# Patient Record
Sex: FEMALE | Race: WHITE | NOT HISPANIC OR LATINO | Employment: OTHER | URBAN - METROPOLITAN AREA
[De-identification: names, ages, dates, MRNs, and addresses within clinical notes are randomized per-mention and may not be internally consistent; named-entity substitution may affect disease eponyms.]

---

## 2017-01-26 ENCOUNTER — COMMUNICATION - HEALTHEAST (OUTPATIENT)
Dept: INTERNAL MEDICINE | Facility: CLINIC | Age: 67
End: 2017-01-26

## 2017-05-15 ENCOUNTER — COMMUNICATION - HEALTHEAST (OUTPATIENT)
Dept: INTERNAL MEDICINE | Facility: CLINIC | Age: 67
End: 2017-05-15

## 2017-07-16 ENCOUNTER — COMMUNICATION - HEALTHEAST (OUTPATIENT)
Dept: INTERNAL MEDICINE | Facility: CLINIC | Age: 67
End: 2017-07-16

## 2017-08-09 ENCOUNTER — COMMUNICATION - HEALTHEAST (OUTPATIENT)
Dept: INTERNAL MEDICINE | Facility: CLINIC | Age: 67
End: 2017-08-09

## 2017-10-03 ENCOUNTER — AMBULATORY - HEALTHEAST (OUTPATIENT)
Dept: NURSING | Facility: CLINIC | Age: 67
End: 2017-10-03

## 2017-11-16 ENCOUNTER — COMMUNICATION - HEALTHEAST (OUTPATIENT)
Dept: INTERNAL MEDICINE | Facility: CLINIC | Age: 67
End: 2017-11-16

## 2018-02-20 ENCOUNTER — COMMUNICATION - HEALTHEAST (OUTPATIENT)
Dept: INTERNAL MEDICINE | Facility: CLINIC | Age: 68
End: 2018-02-20

## 2018-02-20 DIAGNOSIS — I10 BENIGN ESSENTIAL HTN: ICD-10-CM

## 2018-12-17 ENCOUNTER — COMMUNICATION - HEALTHEAST (OUTPATIENT)
Dept: INTERNAL MEDICINE | Facility: CLINIC | Age: 68
End: 2018-12-17

## 2019-01-14 ENCOUNTER — OFFICE VISIT - HEALTHEAST (OUTPATIENT)
Dept: INTERNAL MEDICINE | Facility: CLINIC | Age: 69
End: 2019-01-14

## 2019-01-14 DIAGNOSIS — G56.01 CARPAL TUNNEL SYNDROME OF RIGHT WRIST: ICD-10-CM

## 2019-01-14 DIAGNOSIS — Z00.00 ROUTINE GENERAL MEDICAL EXAMINATION AT A HEALTH CARE FACILITY: ICD-10-CM

## 2019-01-14 DIAGNOSIS — Z12.31 VISIT FOR SCREENING MAMMOGRAM: ICD-10-CM

## 2019-01-14 DIAGNOSIS — I10 ESSENTIAL HYPERTENSION: ICD-10-CM

## 2019-01-14 DIAGNOSIS — Z12.11 SCREEN FOR COLON CANCER: ICD-10-CM

## 2019-01-14 LAB
ALBUMIN SERPL-MCNC: 3.9 G/DL (ref 3.5–5)
ALP SERPL-CCNC: 117 U/L (ref 45–120)
ALT SERPL W P-5'-P-CCNC: 15 U/L (ref 0–45)
ANION GAP SERPL CALCULATED.3IONS-SCNC: 11 MMOL/L (ref 5–18)
AST SERPL W P-5'-P-CCNC: 20 U/L (ref 0–40)
BILIRUB SERPL-MCNC: 1 MG/DL (ref 0–1)
BUN SERPL-MCNC: 32 MG/DL (ref 8–22)
CALCIUM SERPL-MCNC: 9.7 MG/DL (ref 8.5–10.5)
CHLORIDE BLD-SCNC: 104 MMOL/L (ref 98–107)
CHOLEST SERPL-MCNC: 187 MG/DL
CO2 SERPL-SCNC: 25 MMOL/L (ref 22–31)
CREAT SERPL-MCNC: 1.28 MG/DL (ref 0.6–1.1)
ERYTHROCYTE [DISTWIDTH] IN BLOOD BY AUTOMATED COUNT: 11.7 % (ref 11–14.5)
FASTING STATUS PATIENT QL REPORTED: NORMAL
GFR SERPL CREATININE-BSD FRML MDRD: 41 ML/MIN/1.73M2
GLUCOSE BLD-MCNC: 93 MG/DL (ref 70–125)
HCT VFR BLD AUTO: 41.3 % (ref 35–47)
HDLC SERPL-MCNC: 62 MG/DL
HGB BLD-MCNC: 14 G/DL (ref 12–16)
LDLC SERPL CALC-MCNC: 98 MG/DL
MCH RBC QN AUTO: 29.7 PG (ref 27–34)
MCHC RBC AUTO-ENTMCNC: 33.8 G/DL (ref 32–36)
MCV RBC AUTO: 88 FL (ref 80–100)
PLATELET # BLD AUTO: 232 THOU/UL (ref 140–440)
PMV BLD AUTO: 7.9 FL (ref 7–10)
POTASSIUM BLD-SCNC: 4 MMOL/L (ref 3.5–5)
PROT SERPL-MCNC: 7.7 G/DL (ref 6–8)
RBC # BLD AUTO: 4.71 MILL/UL (ref 3.8–5.4)
SODIUM SERPL-SCNC: 140 MMOL/L (ref 136–145)
TRIGL SERPL-MCNC: 137 MG/DL
WBC: 8.7 THOU/UL (ref 4–11)

## 2019-01-14 ASSESSMENT — MIFFLIN-ST. JEOR: SCORE: 1166.79

## 2019-01-15 ENCOUNTER — COMMUNICATION - HEALTHEAST (OUTPATIENT)
Dept: INTERNAL MEDICINE | Facility: CLINIC | Age: 69
End: 2019-01-15

## 2019-01-18 ENCOUNTER — RECORDS - HEALTHEAST (OUTPATIENT)
Dept: ADMINISTRATIVE | Facility: OTHER | Age: 69
End: 2019-01-18

## 2019-02-14 ENCOUNTER — RECORDS - HEALTHEAST (OUTPATIENT)
Dept: ADMINISTRATIVE | Facility: OTHER | Age: 69
End: 2019-02-14

## 2019-02-18 ENCOUNTER — OFFICE VISIT - HEALTHEAST (OUTPATIENT)
Dept: INTERNAL MEDICINE | Facility: CLINIC | Age: 69
End: 2019-02-18

## 2019-02-18 DIAGNOSIS — Z01.818 PREOP GENERAL PHYSICAL EXAM: ICD-10-CM

## 2019-02-18 DIAGNOSIS — G56.01 CARPAL TUNNEL SYNDROME OF RIGHT WRIST: ICD-10-CM

## 2019-02-18 DIAGNOSIS — I10 ESSENTIAL HYPERTENSION: ICD-10-CM

## 2019-02-18 LAB
ANION GAP SERPL CALCULATED.3IONS-SCNC: 11 MMOL/L (ref 5–18)
BUN SERPL-MCNC: 30 MG/DL (ref 8–22)
CALCIUM SERPL-MCNC: 9.9 MG/DL (ref 8.5–10.5)
CHLORIDE BLD-SCNC: 104 MMOL/L (ref 98–107)
CO2 SERPL-SCNC: 25 MMOL/L (ref 22–31)
CREAT SERPL-MCNC: 1.08 MG/DL (ref 0.6–1.1)
ERYTHROCYTE [DISTWIDTH] IN BLOOD BY AUTOMATED COUNT: 12.6 % (ref 11–14.5)
GFR SERPL CREATININE-BSD FRML MDRD: 50 ML/MIN/1.73M2
GLUCOSE BLD-MCNC: 83 MG/DL (ref 70–125)
HCT VFR BLD AUTO: 42.1 % (ref 35–47)
HGB BLD-MCNC: 14.5 G/DL (ref 12–16)
MCH RBC QN AUTO: 29.5 PG (ref 27–34)
MCHC RBC AUTO-ENTMCNC: 34.4 G/DL (ref 32–36)
MCV RBC AUTO: 86 FL (ref 80–100)
PLATELET # BLD AUTO: 230 THOU/UL (ref 140–440)
PMV BLD AUTO: 8.5 FL (ref 7–10)
POTASSIUM BLD-SCNC: 4.1 MMOL/L (ref 3.5–5)
RBC # BLD AUTO: 4.9 MILL/UL (ref 3.8–5.4)
SODIUM SERPL-SCNC: 140 MMOL/L (ref 136–145)
WBC: 10.7 THOU/UL (ref 4–11)

## 2019-02-18 ASSESSMENT — MIFFLIN-ST. JEOR: SCORE: 1166.79

## 2019-02-19 LAB
ATRIAL RATE - MUSE: 55 BPM
DIASTOLIC BLOOD PRESSURE - MUSE: NORMAL MMHG
INTERPRETATION ECG - MUSE: NORMAL
P AXIS - MUSE: 36 DEGREES
PR INTERVAL - MUSE: 144 MS
QRS DURATION - MUSE: 70 MS
QT - MUSE: 426 MS
QTC - MUSE: 407 MS
R AXIS - MUSE: -3 DEGREES
SYSTOLIC BLOOD PRESSURE - MUSE: NORMAL MMHG
T AXIS - MUSE: 29 DEGREES
VENTRICULAR RATE- MUSE: 55 BPM

## 2019-02-21 ENCOUNTER — RECORDS - HEALTHEAST (OUTPATIENT)
Dept: ADMINISTRATIVE | Facility: OTHER | Age: 69
End: 2019-02-21

## 2019-03-05 ENCOUNTER — COMMUNICATION - HEALTHEAST (OUTPATIENT)
Dept: LAB | Facility: CLINIC | Age: 69
End: 2019-03-05

## 2019-03-05 DIAGNOSIS — I10 ESSENTIAL HYPERTENSION: ICD-10-CM

## 2019-03-06 ENCOUNTER — RECORDS - HEALTHEAST (OUTPATIENT)
Dept: ADMINISTRATIVE | Facility: OTHER | Age: 69
End: 2019-03-06

## 2019-03-18 ENCOUNTER — AMBULATORY - HEALTHEAST (OUTPATIENT)
Dept: LAB | Facility: CLINIC | Age: 69
End: 2019-03-18

## 2019-03-18 DIAGNOSIS — I10 ESSENTIAL HYPERTENSION: ICD-10-CM

## 2019-03-18 LAB
CREAT SERPL-MCNC: 1.24 MG/DL (ref 0.6–1.1)
GFR SERPL CREATININE-BSD FRML MDRD: 43 ML/MIN/1.73M2

## 2019-03-21 ENCOUNTER — AMBULATORY - HEALTHEAST (OUTPATIENT)
Dept: INTERNAL MEDICINE | Facility: CLINIC | Age: 69
End: 2019-03-21

## 2019-03-22 ENCOUNTER — HOSPITAL ENCOUNTER (OUTPATIENT)
Dept: MAMMOGRAPHY | Facility: CLINIC | Age: 69
Discharge: HOME | End: 2019-03-22
Attending: INTERNAL MEDICINE

## 2019-03-22 DIAGNOSIS — Z12.31 VISIT FOR SCREENING MAMMOGRAM: ICD-10-CM

## 2019-04-04 ENCOUNTER — RECORDS - HEALTHEAST (OUTPATIENT)
Dept: ADMINISTRATIVE | Facility: OTHER | Age: 69
End: 2019-04-04

## 2019-04-24 ENCOUNTER — RECORDS - HEALTHEAST (OUTPATIENT)
Dept: ADMINISTRATIVE | Facility: OTHER | Age: 69
End: 2019-04-24

## 2019-05-03 ENCOUNTER — COMMUNICATION - HEALTHEAST (OUTPATIENT)
Dept: INTERNAL MEDICINE | Facility: CLINIC | Age: 69
End: 2019-05-03

## 2019-05-09 ENCOUNTER — OFFICE VISIT - HEALTHEAST (OUTPATIENT)
Dept: INTERNAL MEDICINE | Facility: CLINIC | Age: 69
End: 2019-05-09

## 2019-05-09 ENCOUNTER — COMMUNICATION - HEALTHEAST (OUTPATIENT)
Dept: TELEHEALTH | Facility: CLINIC | Age: 69
End: 2019-05-09

## 2019-05-09 ENCOUNTER — COMMUNICATION - HEALTHEAST (OUTPATIENT)
Dept: INTERNAL MEDICINE | Facility: CLINIC | Age: 69
End: 2019-05-09

## 2019-05-09 DIAGNOSIS — R79.89 ELEVATED SERUM CREATININE: ICD-10-CM

## 2019-05-09 DIAGNOSIS — I10 ESSENTIAL HYPERTENSION: ICD-10-CM

## 2019-05-09 DIAGNOSIS — G56.01 CARPAL TUNNEL SYNDROME OF RIGHT WRIST: ICD-10-CM

## 2019-05-09 LAB
CREAT SERPL-MCNC: 1.06 MG/DL (ref 0.6–1.1)
GFR SERPL CREATININE-BSD FRML MDRD: 51 ML/MIN/1.73M2

## 2019-05-09 ASSESSMENT — MIFFLIN-ST. JEOR: SCORE: 1158.26

## 2021-03-04 ENCOUNTER — AMBULATORY - HEALTHEAST (OUTPATIENT)
Dept: NURSING | Facility: CLINIC | Age: 71
End: 2021-03-04

## 2021-03-25 ENCOUNTER — AMBULATORY - HEALTHEAST (OUTPATIENT)
Dept: NURSING | Facility: CLINIC | Age: 71
End: 2021-03-25

## 2021-05-07 ENCOUNTER — OFFICE VISIT - HEALTHEAST (OUTPATIENT)
Dept: INTERNAL MEDICINE | Facility: CLINIC | Age: 71
End: 2021-05-07

## 2021-05-07 DIAGNOSIS — E55.9 VITAMIN D INSUFFICIENCY: ICD-10-CM

## 2021-05-07 DIAGNOSIS — M81.0 AGE-RELATED OSTEOPOROSIS WITHOUT CURRENT PATHOLOGICAL FRACTURE: ICD-10-CM

## 2021-05-07 DIAGNOSIS — E78.2 MIXED HYPERLIPIDEMIA: ICD-10-CM

## 2021-05-07 DIAGNOSIS — N18.2 CHRONIC KIDNEY DISEASE, STAGE II (MILD): ICD-10-CM

## 2021-05-07 DIAGNOSIS — I78.1 NON-NEOPLASTIC NEVUS: ICD-10-CM

## 2021-05-07 DIAGNOSIS — Z12.31 VISIT FOR SCREENING MAMMOGRAM: ICD-10-CM

## 2021-05-07 LAB
ALBUMIN SERPL-MCNC: 4.1 G/DL (ref 3.5–5)
ALP SERPL-CCNC: 122 U/L (ref 45–120)
ALT SERPL W P-5'-P-CCNC: 18 U/L (ref 0–45)
ANION GAP SERPL CALCULATED.3IONS-SCNC: 11 MMOL/L (ref 5–18)
AST SERPL W P-5'-P-CCNC: 23 U/L (ref 0–40)
BILIRUB SERPL-MCNC: 0.8 MG/DL (ref 0–1)
BUN SERPL-MCNC: 19 MG/DL (ref 8–28)
CALCIUM SERPL-MCNC: 9.3 MG/DL (ref 8.5–10.5)
CHLORIDE BLD-SCNC: 106 MMOL/L (ref 98–107)
CHOLEST SERPL-MCNC: 166 MG/DL
CO2 SERPL-SCNC: 26 MMOL/L (ref 22–31)
CREAT SERPL-MCNC: 1.02 MG/DL (ref 0.6–1.1)
ERYTHROCYTE [DISTWIDTH] IN BLOOD BY AUTOMATED COUNT: 13.4 % (ref 11–14.5)
FASTING STATUS PATIENT QL REPORTED: YES
GFR SERPL CREATININE-BSD FRML MDRD: 53 ML/MIN/1.73M2
GLUCOSE BLD-MCNC: 88 MG/DL (ref 70–125)
HCT VFR BLD AUTO: 43.9 % (ref 35–47)
HDLC SERPL-MCNC: 59 MG/DL
HGB BLD-MCNC: 14.2 G/DL (ref 12–16)
LDLC SERPL CALC-MCNC: 94 MG/DL
MCH RBC QN AUTO: 28.9 PG (ref 27–34)
MCHC RBC AUTO-ENTMCNC: 32.3 G/DL (ref 32–36)
MCV RBC AUTO: 89 FL (ref 80–100)
PLATELET # BLD AUTO: 260 THOU/UL (ref 140–440)
PMV BLD AUTO: 10.4 FL (ref 7–10)
POTASSIUM BLD-SCNC: 4.5 MMOL/L (ref 3.5–5)
PROT SERPL-MCNC: 7.6 G/DL (ref 6–8)
RBC # BLD AUTO: 4.92 MILL/UL (ref 3.8–5.4)
SODIUM SERPL-SCNC: 143 MMOL/L (ref 136–145)
TRIGL SERPL-MCNC: 66 MG/DL
TSH SERPL DL<=0.005 MIU/L-ACNC: 1.56 UIU/ML (ref 0.3–5)
WBC: 8.1 THOU/UL (ref 4–11)

## 2021-05-07 ASSESSMENT — MIFFLIN-ST. JEOR: SCORE: 1116.89

## 2021-05-10 LAB — 25(OH)D3 SERPL-MCNC: 36.7 NG/ML (ref 30–80)

## 2021-05-11 ENCOUNTER — RECORDS - HEALTHEAST (OUTPATIENT)
Dept: ADMINISTRATIVE | Facility: OTHER | Age: 71
End: 2021-05-11

## 2021-05-11 ENCOUNTER — RECORDS - HEALTHEAST (OUTPATIENT)
Dept: BONE DENSITY | Facility: CLINIC | Age: 71
End: 2021-05-11

## 2021-05-11 ENCOUNTER — RECORDS - HEALTHEAST (OUTPATIENT)
Dept: MAMMOGRAPHY | Facility: CLINIC | Age: 71
End: 2021-05-11

## 2021-05-11 DIAGNOSIS — M81.0 AGE-RELATED OSTEOPOROSIS WITHOUT CURRENT PATHOLOGICAL FRACTURE: ICD-10-CM

## 2021-05-11 DIAGNOSIS — Z12.31 ENCOUNTER FOR SCREENING MAMMOGRAM FOR MALIGNANT NEOPLASM OF BREAST: ICD-10-CM

## 2021-05-25 ENCOUNTER — RECORDS - HEALTHEAST (OUTPATIENT)
Dept: ADMINISTRATIVE | Facility: CLINIC | Age: 71
End: 2021-05-25

## 2021-05-26 ENCOUNTER — RECORDS - HEALTHEAST (OUTPATIENT)
Dept: ADMINISTRATIVE | Facility: CLINIC | Age: 71
End: 2021-05-26

## 2021-05-27 ENCOUNTER — RECORDS - HEALTHEAST (OUTPATIENT)
Dept: ADMINISTRATIVE | Facility: CLINIC | Age: 71
End: 2021-05-27

## 2021-05-27 VITALS
DIASTOLIC BLOOD PRESSURE: 82 MMHG | SYSTOLIC BLOOD PRESSURE: 124 MMHG | WEIGHT: 143 LBS | HEIGHT: 62 IN | HEART RATE: 76 BPM | BODY MASS INDEX: 26.31 KG/M2 | TEMPERATURE: 98.7 F | OXYGEN SATURATION: 96 %

## 2021-05-28 ENCOUNTER — RECORDS - HEALTHEAST (OUTPATIENT)
Dept: ADMINISTRATIVE | Facility: CLINIC | Age: 71
End: 2021-05-28

## 2021-05-28 NOTE — TELEPHONE ENCOUNTER
"Upcoming Appointment Question  When is the appointment: Thurs 5/09/2019  What is your appointment for?: 6 week follow up \"repeat creatinine level and repeat blood pressure reading\"  Who is your appointment scheduled with?: PCP only  What is your question/concern?: Patient wonders if Dr Howe would like her to have creatinine lab drawn prior to this appointment.  Okay to leave a detailed message?: Yes    "

## 2021-05-28 NOTE — PROGRESS NOTES
"Office Visit - Follow up    Marifer DE LA ROSA Marion   69 y.o. female    Date of Visit: 5/9/2019    Chief Complaint   Patient presents with     Follow-up     labs recheck     Hypertension     Off Lisinopril-HCTZ X6 weeks - BP check       Subjective: Marifer is seen after recent successful carpal tunnel surgery    Generally has done well since that time and has had no significant symptoms with healing    Did have modest elevation of serum creatinine and we elected to stop her ACE inhibitor.  She is feeling well and due for follow-up creatinine        ROS: A comprehensive review of systems was performed and was otherwise negative except as mentioned above.     Exam  Right wrist looks excellent is healing well no loss of function hand grasp is normal remainder of brief exam negative   /80 (Patient Site: Right Arm, Patient Position: Sitting, Cuff Size: Adult Regular)   Pulse 63   Ht 5' 2\" (1.575 m)   Wt 152 lb 1.9 oz (69 kg)   SpO2 99%   Breastfeeding? No   BMI 27.82 kg/m      Assessment and Plan  Mild increase in creatinine follow-up plan off of ACE inhibitor    Successful carpal tunnel surgery stable    Hypertension stable off of therapy    Marifer was seen today for follow-up and hypertension.    Diagnoses and all orders for this visit:    Carpal tunnel syndrome of right wrist    Essential hypertension  -     Discontinue: cloNIDine HCl (CATAPRES) 0.1 MG tablet; Take 0.5 tablets (0.05 mg total) by mouth 2 (two) times a day.    Elevated serum creatinine  -     Discontinue: cloNIDine HCl (CATAPRES) 0.1 MG tablet; Take 0.5 tablets (0.05 mg total) by mouth 2 (two) times a day.  -     Creatinine          Time: total time spent with the patient was 20 minutes of which >50% was spent in counseling and coordination of care        No Known Allergies    Medications :  Prior to Admission medications    Medication Sig Start Date End Date Taking? Authorizing Provider   cholecalciferol, vitamin D3, (VITAMIN D3) 2,000 unit capsule " Take 1,000 Units by mouth daily.   Yes PROVIDER, HISTORICAL   cloNIDine HCl (CATAPRES) 0.1 MG tablet Take 1 tablet (0.1 mg total) by mouth 2 (two) times a day.  Patient taking differently: Take 0.1 mg by mouth 2 (two) times a day. Takes 1/2 tablet daily       2/21/18 5/9/19 Yes Aaron Howe MD   cloNIDine HCl (CATAPRES) 0.1 MG tablet Take 0.5 tablets (0.05 mg total) by mouth 2 (two) times a day. 5/9/19 5/9/19 Yes Aaron Howe MD   cloNIDine HCl (CATAPRES) 0.1 MG tablet Take 0.5 tablets (0.05 mg total) by mouth 2 (two) times a day. 5/9/19   Aaron Howe MD   lisinopril-hydrochlorothiazide (PRINZIDE,ZESTORETIC) 10-12.5 mg per tablet TAKE 1 TABLET BY MOUTH DAILY 11/28/15 5/9/19  Aaron Howe MD        Past Medical History: No past medical history on file.    Past Surgical History: No past surgical history on file.    Social History:   Social History     Socioeconomic History     Marital status:      Spouse name: Not on file     Number of children: Not on file     Years of education: Not on file     Highest education level: Not on file   Occupational History     Not on file   Social Needs     Financial resource strain: Not on file     Food insecurity:     Worry: Not on file     Inability: Not on file     Transportation needs:     Medical: Not on file     Non-medical: Not on file   Tobacco Use     Smoking status: Never Smoker     Smokeless tobacco: Never Used   Substance and Sexual Activity     Alcohol use: Not on file     Drug use: Not on file     Sexual activity: Not on file   Lifestyle     Physical activity:     Days per week: Not on file     Minutes per session: Not on file     Stress: Not on file   Relationships     Social connections:     Talks on phone: Not on file     Gets together: Not on file     Attends Protestant service: Not on file     Active member of club or organization: Not on file     Attends meetings of clubs or organizations: Not on file     Relationship status: Not on file      Intimate partner violence:     Fear of current or ex partner: Not on file     Emotionally abused: Not on file     Physically abused: Not on file     Forced sexual activity: Not on file   Other Topics Concern     Not on file   Social History Narrative     Not on file       Family History: No family history on file.      Aaron Howe MD

## 2021-05-28 NOTE — TELEPHONE ENCOUNTER
Dr. Howe,    Do you want patient to have labs drawn before appt on 5/9/2019 or at time of appt?    Rama PRITCHETT LPN .......... 11:48 AM  05/03/19

## 2021-05-29 ENCOUNTER — RECORDS - HEALTHEAST (OUTPATIENT)
Dept: ADMINISTRATIVE | Facility: CLINIC | Age: 71
End: 2021-05-29

## 2021-05-31 ENCOUNTER — RECORDS - HEALTHEAST (OUTPATIENT)
Dept: ADMINISTRATIVE | Facility: CLINIC | Age: 71
End: 2021-05-31

## 2021-06-02 VITALS — BODY MASS INDEX: 28.34 KG/M2 | WEIGHT: 154 LBS | HEIGHT: 62 IN

## 2021-06-02 VITALS — WEIGHT: 154 LBS | HEIGHT: 62 IN | BODY MASS INDEX: 28.34 KG/M2

## 2021-06-03 VITALS — WEIGHT: 152.12 LBS | BODY MASS INDEX: 27.99 KG/M2 | HEIGHT: 62 IN

## 2021-06-15 ENCOUNTER — RECORDS - HEALTHEAST (OUTPATIENT)
Dept: ADMINISTRATIVE | Facility: OTHER | Age: 71
End: 2021-06-15

## 2021-06-16 PROBLEM — G56.01 CARPAL TUNNEL SYNDROME OF RIGHT WRIST: Status: ACTIVE | Noted: 2019-01-14

## 2021-06-16 NOTE — TELEPHONE ENCOUNTER
Telephone Encounter by Mary Wolf CMA at 5/6/2019  4:00 PM     Author: Mary Wolf CMA Service: -- Author Type: Medical Assistant    Filed: 5/6/2019  4:01 PM Encounter Date: 5/3/2019 Status: Signed    : Mary Wolf CMA (Medical Assistant)       Left the following message for the patient from Dr. Howe:  Aaron Howe MD Rezac, Kellie R, MARKUS; Aaron Howe Care Team Pool 1 hour ago (2:23 PM)      I really do not think she needs to come in for another visit I would be happy to recheck her blood pressure and we will check creatinine at that time I can follow-up with her regarding the results later in the day this will save her a trip downtown      Asked the patient to call back if she has any further questions.  Mary RAHMAN CMA/CASE....................4:01 PM

## 2021-06-17 NOTE — PROGRESS NOTES
Office Visit -[facility] care   Marifer Temple   71 y.o.  female    Date of visit: 5/7/2021  Physician: Siomara Ott MD     Assessment and Plan   1. Visit for screening mammogram  Has been not really compliant with mammograms because she always used to get callbacks.  Will make her do with 3D and she can stay at the [facility] for this.  She does have family history of 2 maternal aunts with breast cancer.  - Mammo Screening Bilateral; Future    2. Age-related osteoporosis without current pathological fracture  She has never had a bone density scan explained what its purpose was and should get a baseline this year.  - DXA Bone Density Scan; Future    3. Mixed hyperlipidemia  Her lipid levels are normal and the HDL LDL ratio is normal however she has a high ASCVD score.  This was explained to her and the preventative dosing of statin was explained to her we will check her lipids today and and she is agreeable to taking a statin if necessary.    - Lipid Cascade FASTING  - HM2(CBC w/o Differential)  - Thyroid Stimulating Hormone (TSH)    4. Chronic kidney disease, stage II (mild)  She has had fluctuating GFR's and mild elevations of creatinine.  I did explain that we will label this as mild chronic kidney disease but really does not have any significant impact on her health.  - Comprehensive Metabolic Panel    5. Vitamin D insufficiency    - Vitamin D, Total (25-Hydroxy)    6. Non-neoplastic nevus  She has she has multiple seborrheic keratosis lesions and solar lentigines and some benign- Ambulatory referral to Dermatology - [facility] [place]  For large nevi over 5 mm.  I do think she should get annual skin exam by dermatologist    She is up-to-date in her colonoscopy.  Needs one every 5 years due to polyps in history colon cancer  Her last Pap smear was at age 50.  Technically that is before she was supposed to stop screening.  However she is very low risk and will defer further Pap smears    Time:  Immunizations reviewed and updated she is due for Pneumo 23 which we will give today.  Return in about 1 year (around 2022) for Annual weelness .       Patient Profile   Social History     Social History Narrative    Lives with  .     Likes to travel ,     Worked in  bank         Past Medical History   No past medical history on file.    Patient Active Problem List    Diagnosis Date Noted     Carpal tunnel syndrome of right wrist 2019     Essential Hypertension      Overview Note:     Created by Conversion    Replacement Utility updated for latest IMO load             Past Surgical History  She has a past surgical history that includes bowel obstruction ; s/p partial colectomy ; and right carpal  tunnel release.     History of Present Illness   This 71 y.o. old very pleasant patient here to establish care.  She is in her normal routine health.  She has no new concerns.  She has no chronic disease such as diabetes, hypertension ischemic heart disease and chronic lung disease.  She only takes a vitamin D supplement she is relatively active and likes to garden.  She lives with her  is fully independent in her ADLs and IADLs she did have a child who  at the age of 7.  Review of Systems: A comprehensive review of systems was negative except as noted.     Medications and Allergies   Current Outpatient Medications   Medication Sig Dispense Refill     cholecalciferol, vitamin D3, (VITAMIN D3) 2,000 unit capsule Take 1,000 Units by mouth daily.       No current facility-administered medications for this visit.      No Known Allergies     Family and Social History   Family History   Problem Relation Age of Onset     Leukemia Mother      Hypertension Mother      Colon cancer Father      Atrial fibrillation Sister      Rheum arthritis Sister      Breast cancer Maternal Aunt      Breast cancer Maternal Aunt         Social History     Tobacco Use     Smoking status: Never Smoker     Smokeless tobacco:  "Never Used   Substance Use Topics     Alcohol use: Not on file     Drug use: Not on file          Physical Exam   General Appearance:       /82 (Patient Site: Left Arm, Patient Position: Sitting, Cuff Size: Adult Regular)   Pulse 76   Temp 98.7  F (37.1  C)   Ht 5' 2\" (1.575 m)   Wt 143 lb (64.9 kg)   SpO2 96%   BMI 26.16 kg/m         EARS,  Hearing was normal to voice and the ears were normal to external exam. Nose appearance was normal and there was no discharge. Oropharynx was normal.  NECK: Neck appearance was normal. There were no neck masses and the thyroid was not enlarged.  RESPIRATORY: Breathing pattern was normal and the chest moved symmetrically.  Percussion/auscultatory percussion was normal.  Lung sounds were normal and there were no abnormal sounds.  CARDIOVASCULAR: Heart rate and rhythm were normal.  S1 and S2 were normal and there were no extra sounds or murmurs. Peripheral pulses in arms and legs were normal.  Jugular venous pressure was normal.  There was no peripheral edema.  GASTROINTESTINAL: The abdomen was normal in contour.  Bowel sounds were present.  Percussion detected no organ enlargement or tenderness.  Palpation detected no tenderness, mass, or enlarged organs.   MUSCULOSKELETAL: Skeletal configuration was normal and muscle mass was normal for age. Joint appearance was overall normal.  She she has mild kyphosis  LYMPHATIC: There were no enlarged nodes.  SKIN/HAIR/NAILS: Skin color was normal.  There were some benign seborrheic keratosis with multiple solar lentigines nevi.  Hair and nails were normal.  PSYCHIATRIC:  Mood and affect were normal and the patient had normal recent and remote memory. The patient's judgment and insight were normal.    BREASTS: No abnormality detected       Additional Information        Siomara Ott MD  Internal Medicine  Contact me at [phone]    "

## 2021-06-17 NOTE — PATIENT INSTRUCTIONS - HE
Patient Instructions by Aaron Howe MD at 1/14/2019 10:00 AM     Author: Aaron Howe MD Service: -- Author Type: Physician    Filed: 1/14/2019  4:52 PM Encounter Date: 1/14/2019 Status: Signed    : Aaron Howe MD (Physician)         Patient Education   Understanding USDA MyPlate  The USDA (US Department of Agriculture) has guidelines to help you make healthy food choices. These are called MyPlate. MyPlate shows the food groups that make up healthy meals using the image of a place setting. Before you eat, think about the healthiest choices for what to put onto your plate or into your cup or bowl. To learn more about building a healthy plate, visit www.choosemyplate.gov.       The Food Groups    Fruits: Any fruit or 100% fruit juice counts as part of the Fruit Group. Fruits may be fresh, canned, frozen, or dried, and may be whole, cut-up, or pureed. Make half your plate fruits and vegetables.    Vegetables: Any vegetable or 100% vegetable juice counts as a member of the Vegetable Group. Vegetables may be fresh, frozen, canned, or dried. They can be served raw or cooked and may be whole, cut-up, or mashed. Make half your plate fruits and vegetables.     Grains: All foods made from grains are part of the Grains Group. These include wheat, rice, oats, cornmeal, and barley such as bread, pasta, oatmeal, cereal, tortillas, and grits. Grains should be no more than a quarter of your plate. At least half of your grains should be whole grains.    Protein: This group includes meat, poultry, seafood, beans and peas, eggs, processed soy products (like tofu), nuts (including nut butters), and seeds. Make protein choices no more than a quarter of your plate. Meat and poultry choices should be lean or low fat.    Dairy: All fluid milk products and foods made from milk that contain calcium, like yogurt and cheese are part of the Dairy Group. (Foods that have little calcium, such as cream, butter, and cream  cheese, are not part of the group.) Most dairy choices should be low-fat or fat-free.    Oils: These are fats that are liquid at room temperature. They include canola, corn, olive, soybean, and sunflower oil. Foods that are mainly oil include mayonnaise, certain salad dressings, and soft margarines. You should have only 5 to 7 teaspoons of oils a day. You probably already get this much from the food you eat.  Use [employer] to Help Build Your Meals  The [employer] can help you plan and track your meals and activity. You can look up individual foods to see or compare their nutritional value. You can get guidelines for what and how much you should eat. You can compare your food choices. And you can assess personal physical activities and see ways you can improve. Go to www.[employer].gov/Register My InfoÂ®cker/.    8944-7638 [employer]. [address] [place] [address]. All rights reserved. This information is not intended as a substitute for professional medical care. Always follow your healthcare professional's instructions.           Patient Education   Understanding Advance Care Planning  Advance care planning is the process of deciding ones own future medical care. It helps ensure that if you cant speak for yourself, your wishes can still be carried out. The plan is a series of legal documents that note a persons wishes. The documents vary by state. Advance care planning may be done when a person has a serious illness that is expected to get worse. It may be done before major surgery. And it can help you and your family be prepared in case of a major illness or injury. Advance care planning helps with making decisions at these times.       A health care proxy is a person who acts as the voice of a patient when the patient cant speak for himself or herself. The name of this role varies by state. It may be called a Durable Medical Power of  or Durable Power of  for Healthcare.  It may be called an agent, surrogate, or advocate. Or it may be called a representative or decision maker. It is an official duty that is identified by a legal document. The document also varies by state.    Why Is Advance Care Planning Important?  If a person communicates their healthcare wishes:    They will be given medical care that matches their values and goals.    Their family members will not be forced to make decisions in a crisis with no guidance.  Creating a Plan  Making an advance care plan is often done in 3 steps:    Thinking about ones wishes. To create an advance care plan, you should think about what kind of medical treatment you would want if you lose the ability to communicate. Are there any situations in which you would refuse or stop treatment? Are there therapies you would want or not want? And whom do you want to make decisions for you? There are many places to learn more about how to plan for your care. Ask your doctor or  for resources.    Picking a health care proxy. This means choosing a trusted person to speak for you only when you cant speak for yourself. When you cannot make medical decisions, your proxy makes sure the instructions in your advance care plan are followed. A proxy does not make decisions based on his or her own opinions. They must put aside those opinions and values if needed, and carry out your wishes.    Filling out the legal documents. There are several kinds of legal documents for advance care planning. Each one tells health care providers your wishes. The documents may vary by state. They must be signed and may need to be witnessed or notarized. You can cancel or change them whenever you wish. Depending on your state, the documents may include a Healthcare Proxy form, Living Will, Durable Medical Power of , Advance Directive, or others.  The Familys Role  The best help a family can give is to support their loved ones wishes. Open and honest  communication is vital. Family should express any concerns they have about the patients choices while the patient can still make decisions.    5028-3789 [employer]. [address] [place] [address], [place] [address]. All rights reserved. This information is not intended as a substitute for professional medical care. Always follow your healthcare professional's instructions.         Also, [facility] offers a free, downloadable health care directive that allows you to share your treatment choices and personal preferences if you cannot communicate your wishes. It also allows you to appoint another person (called a health care agent) to make health care decisions if you are unable to do so. You can download an advance directive by going here: [email or web address]     Patient Education   Personalized Prevention Plan  You are due for the preventive services outlined below.  Your care team is available to assist you in scheduling these services.  If you have already completed any of these items, please share that information with your care team to update in your medical record.  Health Maintenance   Topic Date Due   ? ZOSTER VACCINES (1 of 2) 04/21/2000   ? MAMMOGRAM  12/27/2012   ? DXA SCAN  04/21/2015   ? PNEUMOCOCCAL POLYSACCHARIDE VACCINE AGE 65 AND OVER  04/21/2015   ? PNEUMOCOCCAL CONJUGATE VACCINE FOR ADULTS (PCV13 OR PREVNAR)  04/21/2015   ? FALL RISK ASSESSMENT  04/21/2015   ? INFLUENZA VACCINE RULE BASED (1) 08/01/2018   ? TD 18+ HE  12/23/2018   ? COLONOSCOPY  04/06/2019   ? ADVANCE DIRECTIVES DISCUSSED WITH PATIENT  10/24/2021

## 2021-06-18 NOTE — LETTER
Letter by Aaron Howe MD at      Author: Aaron Howe MD Service: -- Author Type: --    Filed:  Encounter Date: 1/15/2019 Status: (Other)       Marifer Temple  [address] [facility] [address]             January 15, 2019         Dear Ms. Temple,    Below are the results from your recent visit:    Resulted Orders   HM2(CBC w/o Differential)   Result Value Ref Range    WBC 8.7 4.0 - 11.0 thou/uL    RBC 4.71 3.80 - 5.40 mill/uL    Hemoglobin 14.0 12.0 - 16.0 g/dL    Hematocrit 41.3 35.0 - 47.0 %    MCV 88 80 - 100 fL    MCH 29.7 27.0 - 34.0 pg    MCHC 33.8 32.0 - 36.0 g/dL    RDW 11.7 11.0 - 14.5 %    Platelets 232 140 - 440 thou/uL    MPV 7.9 7.0 - 10.0 fL   Comprehensive Metabolic Panel   Result Value Ref Range    Sodium 140 136 - 145 mmol/L    Potassium 4.0 3.5 - 5.0 mmol/L    Chloride 104 98 - 107 mmol/L    CO2 25 22 - 31 mmol/L    Anion Gap, Calculation 11 5 - 18 mmol/L    Glucose 93 70 - 125 mg/dL    BUN 32 (H) 8 - 22 mg/dL    Creatinine 1.28 (H) 0.60 - 1.10 mg/dL    GFR MDRD Af Amer 50 (L) >60 mL/min/1.73m2    GFR MDRD Non Af Amer 41 (L) >60 mL/min/1.73m2    Bilirubin, Total 1.0 0.0 - 1.0 mg/dL    Calcium 9.7 8.5 - 10.5 mg/dL    Protein, Total 7.7 6.0 - 8.0 g/dL    Albumin 3.9 3.5 - 5.0 g/dL    Alkaline Phosphatase 117 45 - 120 U/L    AST 20 0 - 40 U/L    ALT 15 0 - 45 U/L    Narrative    Fasting Glucose reference range is 70-99 mg/dL per  American Diabetes Association (ADA) guidelines.   Lipid Cascade   Result Value Ref Range    Cholesterol 187 <=199 mg/dL    Triglycerides 137 <=149 mg/dL    HDL Cholesterol 62 >=50 mg/dL    LDL Calculated 98 <=129 mg/dL    Patient Fasting > 8hrs? Unknown        All labs look good.  Your creatinine is up a little bit.  This is a measure of kidney function and may be related to mild dehydration.  I do not think there is anything to be concerned about however I would make sure you drink plenty of liquids and we should repeat your creatinine in 2 months    Please  call with questions or contact us using [employer].    Sincerely,        Electronically signed by Aaron Howe MD

## 2021-06-19 NOTE — LETTER
Letter by Aaron Howe MD at      Author: Aaron Howe MD Service: -- Author Type: --    Filed:  Encounter Date: 5/9/2019 Status: (Other)         Marifer Temple  [facility] [place] [address]             May 9, 2019         Dear Ms. Temple,    Below are the results from your recent visit:    Resulted Orders   Creatinine   Result Value Ref Range    Creatinine 1.06 0.60 - 1.10 mg/dL    GFR MDRD Af Amer >60 >60 mL/min/1.73m2    GFR MDRD Non Af Amer 51 (L) >60 mL/min/1.73m2       Normal!    Please call with questions or contact us using [employer].    Sincerely,        Electronically signed by Aaron Howe MD

## 2021-06-19 NOTE — LETTER
Letter by Aaron Howe MD at      Author: Aaron Howe MD Service: -- Author Type: --    Filed:  Encounter Date: 5/9/2019 Status: (Other)         Marifer Temple  [facility] [place] [address]             May 9, 2019         Dear Ms. Temple,    Below are the results from your recent visit:    Resulted Orders   Creatinine   Result Value Ref Range    Creatinine 1.06 0.60 - 1.10 mg/dL    GFR MDRD Af Amer >60 >60 mL/min/1.73m2    GFR MDRD Non Af Amer 51 (L) >60 mL/min/1.73m2           Please call with questions or contact us using [employer].    Sincerely,        Electronically signed by Aaron Howe MD

## 2021-06-23 NOTE — PROGRESS NOTES
"Office Visit - Physical    Marifer DE LA ROSA [place]   68 y.o. female    Date of Visit: 1/14/2019    Chief Complaint   Patient presents with     Annual Wellness Visit     Pt is fasting       Subjective: Delightful well known 68-year-old patient here for routine physical exam and initial annual wellness visit.  Wellness information including health risk assessment mood assessment and cognitive assessment are reviewed.  Advanced directives reviewed and discussed she remains \"full code\" and this will be respected    General health has been excellent she has a history of hypertension well controlled with current regimen.  Has symptoms suggestive of carpal tunnel syndrome right hand with some numbness of the thumb and first 2 fingers.  There has been some weakness in the hand and muscular atrophy of the thenar eminence.    Personal social and family history reviewed no change    No major health concerns or surgeries.  Current regimen reviewed and discussed.    ROS: A comprehensive review of systems was performed and was otherwise negative except as mentioned above.    Exam   Alert and oriented normal cognitive function head and neck negative EENT normal right hand some atrophy with positive Tinel's sign heart and lungs negative breast exam normal head and neck normal skin negative extremities normal neuro exam negative  /70 (Patient Site: Left Arm, Patient Position: Sitting)   Pulse 64   Ht 5' 2\" (1.575 m)   Wt 154 lb (69.9 kg)   BMI 28.17 kg/m      Assessment and Plan    Stable physical exam with hypertension well controlled.  Labs pending.    Have referred to Dr. Derrick Rockwell for discussion regarding right-sided carpal tunnel surgery    No other changes or concerns    Marifer was seen today for annual wellness visit.    Diagnoses and all orders for this visit:    Visit for screening mammogram  -     Mammo Screening Bilateral; Future  -     HM2(CBC w/o Differential); Future  -     Comprehensive Metabolic Panel; Future  -     " Lipid Cascade; Future  -     HM2(CBC w/o Differential)  -     Comprehensive Metabolic Panel  -     Lipid Cascade    Screen for colon cancer  -     Ambulatory referral for Colonoscopy  -     HM2(CBC w/o Differential); Future  -     Comprehensive Metabolic Panel; Future  -     Lipid Cascade; Future  -     HM2(CBC w/o Differential)  -     Comprehensive Metabolic Panel  -     Lipid Cascade    Routine general medical examination at a health care facility  -     HM2(CBC w/o Differential); Future  -     Comprehensive Metabolic Panel; Future  -     Lipid Cascade; Future  -     HM2(CBC w/o Differential)  -     Comprehensive Metabolic Panel  -     Lipid Cascade    Essential hypertension  -     HM2(CBC w/o Differential); Future  -     Comprehensive Metabolic Panel; Future  -     Lipid Cascade; Future  -     HM2(CBC w/o Differential)  -     Comprehensive Metabolic Panel  -     Lipid Cascade    Carpal tunnel syndrome of right wrist  -     Ambulatory referral to Orthopedics  -     2(CBC w/o Differential); Future  -     Comprehensive Metabolic Panel; Future  -     Lipid Cascade; Future  -     HM2(CBC w/o Differential)  -     Comprehensive Metabolic Panel  -     Lipid Cascade    Other orders  -     Influenza High Dose, Seasonal 65+ yrs  -     Pneumococcal conjugate vaccine 13-valent 6wks-17yrs; >50yrs              Medications:   Prior to Admission medications    Medication Sig Start Date End Date Taking? Authorizing Provider   cholecalciferol, vitamin D3, (VITAMIN D3) 2,000 unit capsule Take 1,000 Units by mouth daily.   Yes PROVIDER, HISTORICAL   cloNIDine HCl (CATAPRES) 0.1 MG tablet Take 1 tablet (0.1 mg total) by mouth 2 (two) times a day.  Patient taking differently: Take 0.1 mg by mouth 2 (two) times a day. Takes 1/2 tablet daily       2/21/18  Yes Aaron Howe MD   lisinopril-hydrochlorothiazide (PRINZIDE,ZESTORETIC) 10-12.5 mg per tablet TAKE 1 TABLET BY MOUTH DAILY 11/28/15  Yes Aaron Howe MD   cloNIDine HCl  (CATAPRES) 0.1 MG tablet TAKE 1 TABLET BY MOUTH TWICE DAILY 2/21/18 1/14/19  Irvin Dorman MD   lisinopril-hydrochlorothiazide (PRINZIDE,ZESTORETIC) 10-12.5 mg per tablet TAKE 1 TABLET BY MOUTH DAILY 12/18/18 1/14/19  Aaron Howe MD   lisinopril-hydrochlorothiazide (PRINZIDE,ZESTORETIC) 10-12.5 mg per tablet Take 1 tablet by mouth daily. 12/17/18 1/14/19  Aaron Howe MD       Allergies:No Known Allergies    Immunizations:   Immunization History   Administered Date(s) Administered     Influenza [id number], 12/18/2009     Influenza high dose, seasonal 01/11/2013, 12/11/2015, 10/24/2016, 10/03/2017, 01/14/2019     Influenza, inj, historic,unspecified 11/13/2008, 12/18/2009, 10/22/2010, 10/13/2011     Influenza, seasonal,quad inj 6-35 mos 10/04/2013, 11/04/2014     Pneumo Conj 13-V (2010&after) 01/14/2019     Tdap 12/23/2008       Past Medical History: No past medical history on file.    Past Surgical History: No past surgical history on file.    Family History: No family history on file.    Social History:   Social History     Socioeconomic History     Marital status:      Spouse name: Not on file     Number of children: Not on file     Years of education: Not on file     Highest education level: Not on file   Social Needs     Financial resource strain: Not on file     Food insecurity - worry: Not on file     Food insecurity - inability: Not on file     Transportation needs - medical: Not on file     Transportation needs - non-medical: Not on file   Occupational History     Not on file   Tobacco Use     Smoking status: Never Smoker     Smokeless tobacco: Never Used   Substance and Sexual Activity     Alcohol use: Not on file     Drug use: Not on file     Sexual activity: Not on file   Other Topics Concern     Not on file   Social History Narrative     Not on file         Aaron Howe MD      Assessment and Plan:           The patient's current medical problems were reviewed.      The following  health maintenance schedule was reviewed with the patient and provided in printed form in the after visit summary:   Health Maintenance   Topic Date Due     ZOSTER VACCINES (1 of 2) 04/21/2000     MAMMOGRAM  12/27/2012     DXA SCAN  04/21/2015     PNEUMOCOCCAL POLYSACCHARIDE VACCINE AGE 65 AND OVER  04/21/2015     PNEUMOCOCCAL CONJUGATE VACCINE FOR ADULTS (PCV13 OR PREVNAR)  04/21/2015     FALL RISK ASSESSMENT  04/21/2015     INFLUENZA VACCINE RULE BASED (1) 08/01/2018     TD 18+ HE  12/23/2018     COLONOSCOPY  04/06/2019     ADVANCE DIRECTIVES DISCUSSED WITH PATIENT  10/24/2021        Subjective:   Chief Complaint: Marifer Temple is an 68 y.o. female here for an Annual Wellness visit.   HPI:      Review of Systems:    Please see above.  The rest of the review of systems are negative for all systems.    Patient Care Team:  Aaron Howe MD as PCP - General     Patient Active Problem List   Diagnosis     Essential Hypertension     Carpal tunnel syndrome of right wrist     No past medical history on file.   No past surgical history on file.   No family history on file.   Social History     Socioeconomic History     Marital status:      Spouse name: Not on file     Number of children: Not on file     Years of education: Not on file     Highest education level: Not on file   Social Needs     Financial resource strain: Not on file     Food insecurity - worry: Not on file     Food insecurity - inability: Not on file     Transportation needs - medical: Not on file     Transportation needs - non-medical: Not on file   Occupational History     Not on file   Tobacco Use     Smoking status: Never Smoker     Smokeless tobacco: Never Used   Substance and Sexual Activity     Alcohol use: Not on file     Drug use: Not on file     Sexual activity: Not on file   Other Topics Concern     Not on file   Social History Narrative     Not on file      Current Outpatient Medications   Medication Sig Dispense Refill      "cholecalciferol, vitamin D3, (VITAMIN D3) 2,000 unit capsule Take 1,000 Units by mouth daily.       cloNIDine HCl (CATAPRES) 0.1 MG tablet Take 1 tablet (0.1 mg total) by mouth 2 (two) times a day. (Patient taking differently: Take 0.1 mg by mouth 2 (two) times a day. Takes 1/2 tablet daily      ) 180 tablet 3     lisinopril-hydrochlorothiazide (PRINZIDE,ZESTORETIC) 10-12.5 mg per tablet TAKE 1 TABLET BY MOUTH DAILY 90 tablet 11     No current facility-administered medications for this visit.       Objective:   Vital Signs:   Visit Vitals  /70 (Patient Site: Left Arm, Patient Position: Sitting)   Pulse 64   Ht 5' 2\" (1.575 m)   Wt 154 lb (69.9 kg)   BMI 28.17 kg/m         VisionScreening:  No exam data present     PHYSICAL EXAM    Assessment Results 1/14/2019   Activities of Daily Living No help needed   Instrumental Activities of Daily Living No help needed   Mini Cog Total Score 5   Some recent data might be hidden     A Mini-Cog score of 0-2 suggests the possibility of dementia, score of 3-5 suggests no dementia    Identified Health Risks:     The patient was counseled and encouraged to consider modifying their diet and eating habits. She was provided with information on recommended healthy diet options.  Information regarding advance directives (living stubbs), including where she can download the appropriate form, was provided to the patient via the AVS.       "

## 2021-06-24 NOTE — PROGRESS NOTES
"Office Visit - Follow up    Marifer DE LA ROSA [place]   68 y.o. female    Date of Visit: 2/18/2019    Chief Complaint   Patient presents with     Pre-op Exam     Right carpal tunnel surgery on 2/21 at [facility] by Dr Rockwell       Subjective: Marifer is a 68 years old and is here for preoperative evaluation prior to planned right carpal tunnel surgery to be performed by Dr. Derrick Rockwell at [facility].  General health is excellent she was just in for comprehensive physical and wellness evaluation 1 month ago.  History includes mild hypertension.  At that time noted significant symptoms of numbness and tingling in the right hand she was referred to Dr. Derrick Rockwell and I refer you to his notes regarding surgical plans and indications    Blood pressure has been excellent without any new concerns or complaints    Current medications reviewed    Comprehensive review of systems is negative for new concerns.    Preoperative labs and studies include CBC metabolic profile and EKG these are unremarkable    Personal social and family history noncontributory        ROS: A comprehensive review of systems was performed and was otherwise negative except as mentioned above.     Exam  Alert and oriented mental status normal skin and lymphatics negative EENT negative lungs clear heart normal breasts negative abdomen benign.    Right upper extremity is examined there is evidence of mild right thenar muscle atrophy and Tinel sign is again positive    Pulses are normal neurovascular function is intact.  Other extremities negative remainder of comprehensive physical including neuro unremarkable   /84 (Patient Site: Left Arm, Patient Position: Sitting)   Pulse 60   Ht 5' 2\" (1.575 m)   Wt 154 lb (69.9 kg)   BMI 28.17 kg/m      Assessment and Plan  Stable for planned surgical procedure no major concerns noted will forward labs when available    Marifer was seen today for pre-op exam.    Diagnoses and all orders for this " visit:    Preop general physical exam  -     Electrocardiogram Perform and Read  -     HM2(CBC w/o Differential); Future  -     Basic Metabolic Panel  -     HM2(CBC w/o Differential)    Carpal tunnel syndrome of right wrist  -     HM2(CBC w/o Differential); Future  -     Basic Metabolic Panel  -     HM2(CBC w/o Differential)    Essential hypertension  -     HM2(CBC w/o Differential); Future  -     Basic Metabolic Panel  -     HM2(CBC w/o Differential)          Time: total time spent with the patient was 25 minutes of which >50% was spent in counseling and coordination of care        No Known Allergies    Medications :  Prior to Admission medications    Medication Sig Start Date End Date Taking? Authorizing Provider   cholecalciferol, vitamin D3, (VITAMIN D3) 2,000 unit capsule Take 1,000 Units by mouth daily.   Yes PROVIDER, HISTORICAL   cloNIDine HCl (CATAPRES) 0.1 MG tablet Take 1 tablet (0.1 mg total) by mouth 2 (two) times a day.  Patient taking differently: Take 0.1 mg by mouth 2 (two) times a day. Takes 1/2 tablet daily       2/21/18  Yes Aaron Howe MD   lisinopril-hydrochlorothiazide (PRINZIDE,ZESTORETIC) 10-12.5 mg per tablet TAKE 1 TABLET BY MOUTH DAILY 11/28/15  Yes Aaron oHwe MD        Past Medical History: No past medical history on file.    Past Surgical History: No past surgical history on file.    Social History:   Social History     Socioeconomic History     Marital status:      Spouse name: Not on file     Number of children: Not on file     Years of education: Not on file     Highest education level: Not on file   Social Needs     Financial resource strain: Not on file     Food insecurity - worry: Not on file     Food insecurity - inability: Not on file     Transportation needs - medical: Not on file     Transportation needs - non-medical: Not on file   Occupational History     Not on file   Tobacco Use     Smoking status: Never Smoker     Smokeless tobacco: Never Used    Substance and Sexual Activity     Alcohol use: Not on file     Drug use: Not on file     Sexual activity: Not on file   Other Topics Concern     Not on file   Social History Narrative     Not on file       Family History: No family history on file.      Aaron Howe MD

## 2021-06-25 NOTE — PROGRESS NOTES
Marifer had successful carpal tunnel surgery.  Her creatinine was a bit higher as noted we did repeat this after her physical in January.  Repeat creatinine this week remains higher than desired at 1.24.  We will stop lisinopril follow-up here in 4 weeks to repeat creatinine and repeat blood pressure readings

## 2021-06-27 ENCOUNTER — HEALTH MAINTENANCE LETTER (OUTPATIENT)
Age: 71
End: 2021-06-27

## 2021-07-03 NOTE — ADDENDUM NOTE
Addendum Note by Jerel Shelton MD at 2/21/2018  7:19 AM     Author: Jerel Shelton MD Service: -- Author Type: Physician    Filed: 2/21/2018  7:19 AM Encounter Date: 2/20/2018 Status: Signed    : Jerel Shelton MD (Physician)    Addended by: JEREL SHELTON on: 2/21/2018 07:19 AM        Modules accepted: Orders

## 2021-07-13 ENCOUNTER — RECORDS - HEALTHEAST (OUTPATIENT)
Dept: ADMINISTRATIVE | Facility: CLINIC | Age: 71
End: 2021-07-13

## 2021-07-21 ENCOUNTER — RECORDS - HEALTHEAST (OUTPATIENT)
Dept: ADMINISTRATIVE | Facility: CLINIC | Age: 71
End: 2021-07-21

## 2021-10-17 ENCOUNTER — HEALTH MAINTENANCE LETTER (OUTPATIENT)
Age: 71
End: 2021-10-17

## 2022-07-24 ENCOUNTER — HEALTH MAINTENANCE LETTER (OUTPATIENT)
Age: 72
End: 2022-07-24

## 2022-10-02 ENCOUNTER — HEALTH MAINTENANCE LETTER (OUTPATIENT)
Age: 72
End: 2022-10-02

## 2023-08-12 ENCOUNTER — HEALTH MAINTENANCE LETTER (OUTPATIENT)
Age: 73
End: 2023-08-12

## 2023-11-20 ENCOUNTER — IMMUNIZATION (OUTPATIENT)
Dept: FAMILY MEDICINE | Facility: CLINIC | Age: 73
End: 2023-11-20
Payer: COMMERCIAL

## 2023-11-20 PROCEDURE — 90480 ADMN SARSCOV2 VAC 1/ONLY CMP: CPT

## 2023-11-20 PROCEDURE — 91320 SARSCV2 VAC 30MCG TRS-SUC IM: CPT

## 2023-11-20 PROCEDURE — G0008 ADMIN INFLUENZA VIRUS VAC: HCPCS

## 2023-11-20 PROCEDURE — 90662 IIV NO PRSV INCREASED AG IM: CPT

## 2023-11-20 NOTE — NURSING NOTE
Prior to immunization administration, verified patients identity using patient s name and date of birth. Please see Immunization Activity for additional information.     Screening Questionnaire for Adult Immunization    Are you sick today?   No   Do you have allergies to medications, food, a vaccine component or latex?   No   Have you ever had a serious reaction after receiving a vaccination?   No   Do you have a long-term health problem with heart, lung, kidney, or metabolic disease (e.g., diabetes), asthma, a blood disorder, no spleen, complement component deficiency, a cochlear implant, or a spinal fluid leak?  Are you on long-term aspirin therapy?   No   Do you have cancer, leukemia, HIV/AIDS, or any other immune system problem?   No   Do you have a parent, brother, or sister with an immune system problem?   No   In the past 3 months, have you taken medications that affect  your immune system, such as prednisone, other steroids, or anticancer drugs; drugs for the treatment of rheumatoid arthritis, Crohn s disease, or psoriasis; or have you had radiation treatments?   No   Have you had a seizure, or a brain or other nervous system problem?   No   During the past year, have you received a transfusion of blood or blood    products, or been given immune (gamma) globulin or antiviral drug?   No   For women: Are you pregnant or is there a chance you could become       pregnant during the next month?   No   Have you received any vaccinations in the past 4 weeks?   No     Immunization questionnaire answers were all negative.    I have reviewed the following standing orders:   This patient is due and qualifies for the Covid-19 vaccine.     Click here for COVID-19 Standing Order    I have reviewed the vaccines inclusion and exclusion criteria; No concerns regarding eligibility.     This patient is due and qualifies for the Influenza vaccine.    Click here for Influenza Vaccine Standing Order    I have reviewed the  vaccines inclusion and exclusion criteria; No concerns regarding eligibility.     Patient instructed to remain in clinic for 15 minutes afterwards, and to report any adverse reactions.     Screening performed by Yusra Fitch MA on 11/20/2023 at 1:23 PM.

## 2024-07-16 ENCOUNTER — HOSPITAL ENCOUNTER (INPATIENT)
Facility: HOSPITAL | Age: 74
LOS: 2 days | Discharge: HOME | DRG: 062 | End: 2024-07-18
Attending: STUDENT IN AN ORGANIZED HEALTH CARE EDUCATION/TRAINING PROGRAM | Admitting: FAMILY MEDICINE
Payer: COMMERCIAL

## 2024-07-16 ENCOUNTER — APPOINTMENT (OUTPATIENT)
Dept: CT IMAGING | Facility: HOSPITAL | Age: 74
DRG: 062 | End: 2024-07-16
Attending: STUDENT IN AN ORGANIZED HEALTH CARE EDUCATION/TRAINING PROGRAM
Payer: COMMERCIAL

## 2024-07-16 DIAGNOSIS — R29.898 LEFT ARM WEAKNESS: ICD-10-CM

## 2024-07-16 DIAGNOSIS — R79.89 TROPONIN LEVEL ELEVATED: ICD-10-CM

## 2024-07-16 DIAGNOSIS — I63.9 ACUTE CVA (CEREBROVASCULAR ACCIDENT) (H): ICD-10-CM

## 2024-07-16 DIAGNOSIS — I10 ESSENTIAL HYPERTENSION: Primary | ICD-10-CM

## 2024-07-16 LAB
ANION GAP SERPL CALCULATED.3IONS-SCNC: 11 MMOL/L (ref 7–15)
APTT PPP: 30 SECONDS (ref 22–38)
BASOPHILS # BLD AUTO: 0 10E3/UL (ref 0–0.2)
BASOPHILS NFR BLD AUTO: 0 %
BUN SERPL-MCNC: 25.3 MG/DL (ref 8–23)
CALCIUM SERPL-MCNC: 9.2 MG/DL (ref 8.8–10.4)
CHLORIDE SERPL-SCNC: 105 MMOL/L (ref 98–107)
CREAT SERPL-MCNC: 1.08 MG/DL (ref 0.51–0.95)
EGFRCR SERPLBLD CKD-EPI 2021: 54 ML/MIN/1.73M2
EOSINOPHIL # BLD AUTO: 0.1 10E3/UL (ref 0–0.7)
EOSINOPHIL NFR BLD AUTO: 1 %
ERYTHROCYTE [DISTWIDTH] IN BLOOD BY AUTOMATED COUNT: 13.8 % (ref 10–15)
GLUCOSE BLDC GLUCOMTR-MCNC: 81 MG/DL (ref 70–99)
GLUCOSE SERPL-MCNC: 98 MG/DL (ref 70–99)
HCO3 SERPL-SCNC: 25 MMOL/L (ref 22–29)
HCT VFR BLD AUTO: 39.9 % (ref 35–47)
HGB BLD-MCNC: 13.4 G/DL (ref 11.7–15.7)
IMM GRANULOCYTES # BLD: 0 10E3/UL
IMM GRANULOCYTES NFR BLD: 0 %
INR PPP: 1.03 (ref 0.85–1.15)
LYMPHOCYTES # BLD AUTO: 1.1 10E3/UL (ref 0.8–5.3)
LYMPHOCYTES NFR BLD AUTO: 10 %
MCH RBC QN AUTO: 29.8 PG (ref 26.5–33)
MCHC RBC AUTO-ENTMCNC: 33.6 G/DL (ref 31.5–36.5)
MCV RBC AUTO: 89 FL (ref 78–100)
MONOCYTES # BLD AUTO: 0.8 10E3/UL (ref 0–1.3)
MONOCYTES NFR BLD AUTO: 7 %
NEUTROPHILS # BLD AUTO: 9.3 10E3/UL (ref 1.6–8.3)
NEUTROPHILS NFR BLD AUTO: 82 %
NRBC # BLD AUTO: 0 10E3/UL
NRBC BLD AUTO-RTO: 0 /100
PLATELET # BLD AUTO: 200 10E3/UL (ref 150–450)
POTASSIUM SERPL-SCNC: 3.9 MMOL/L (ref 3.4–5.3)
RBC # BLD AUTO: 4.49 10E6/UL (ref 3.8–5.2)
SODIUM SERPL-SCNC: 141 MMOL/L (ref 135–145)
TROPONIN T SERPL HS-MCNC: 64 NG/L
TROPONIN T SERPL HS-MCNC: 65 NG/L
WBC # BLD AUTO: 11.4 10E3/UL (ref 4–11)

## 2024-07-16 PROCEDURE — 99291 CRITICAL CARE FIRST HOUR: CPT | Mod: GC | Performed by: STUDENT IN AN ORGANIZED HEALTH CARE EDUCATION/TRAINING PROGRAM

## 2024-07-16 PROCEDURE — 99291 CRITICAL CARE FIRST HOUR: CPT | Mod: 25

## 2024-07-16 PROCEDURE — 70496 CT ANGIOGRAPHY HEAD: CPT

## 2024-07-16 PROCEDURE — 85025 COMPLETE CBC W/AUTO DIFF WBC: CPT | Performed by: STUDENT IN AN ORGANIZED HEALTH CARE EDUCATION/TRAINING PROGRAM

## 2024-07-16 PROCEDURE — 85610 PROTHROMBIN TIME: CPT | Performed by: STUDENT IN AN ORGANIZED HEALTH CARE EDUCATION/TRAINING PROGRAM

## 2024-07-16 PROCEDURE — 82962 GLUCOSE BLOOD TEST: CPT

## 2024-07-16 PROCEDURE — 84484 ASSAY OF TROPONIN QUANT: CPT | Performed by: STUDENT IN AN ORGANIZED HEALTH CARE EDUCATION/TRAINING PROGRAM

## 2024-07-16 PROCEDURE — 96365 THER/PROPH/DIAG IV INF INIT: CPT

## 2024-07-16 PROCEDURE — 36415 COLL VENOUS BLD VENIPUNCTURE: CPT | Performed by: STUDENT IN AN ORGANIZED HEALTH CARE EDUCATION/TRAINING PROGRAM

## 2024-07-16 PROCEDURE — 82374 ASSAY BLOOD CARBON DIOXIDE: CPT | Performed by: STUDENT IN AN ORGANIZED HEALTH CARE EDUCATION/TRAINING PROGRAM

## 2024-07-16 PROCEDURE — 93010 ELECTROCARDIOGRAM REPORT: CPT | Performed by: INTERNAL MEDICINE

## 2024-07-16 PROCEDURE — 250N000011 HC RX IP 250 OP 636: Performed by: STUDENT IN AN ORGANIZED HEALTH CARE EDUCATION/TRAINING PROGRAM

## 2024-07-16 PROCEDURE — 93005 ELECTROCARDIOGRAM TRACING: CPT

## 2024-07-16 PROCEDURE — 93005 ELECTROCARDIOGRAM TRACING: CPT | Performed by: STUDENT IN AN ORGANIZED HEALTH CARE EDUCATION/TRAINING PROGRAM

## 2024-07-16 PROCEDURE — 3E03317 INTRODUCTION OF OTHER THROMBOLYTIC INTO PERIPHERAL VEIN, PERCUTANEOUS APPROACH: ICD-10-PCS | Performed by: STUDENT IN AN ORGANIZED HEALTH CARE EDUCATION/TRAINING PROGRAM

## 2024-07-16 PROCEDURE — 85730 THROMBOPLASTIN TIME PARTIAL: CPT | Performed by: STUDENT IN AN ORGANIZED HEALTH CARE EDUCATION/TRAINING PROGRAM

## 2024-07-16 PROCEDURE — 96374 THER/PROPH/DIAG INJ IV PUSH: CPT

## 2024-07-16 PROCEDURE — 99223 1ST HOSP IP/OBS HIGH 75: CPT | Performed by: FAMILY MEDICINE

## 2024-07-16 PROCEDURE — 93005 ELECTROCARDIOGRAM TRACING: CPT | Performed by: FAMILY MEDICINE

## 2024-07-16 PROCEDURE — 200N000001 HC R&B ICU

## 2024-07-16 PROCEDURE — 99292 CRITICAL CARE ADDL 30 MIN: CPT

## 2024-07-16 PROCEDURE — 82565 ASSAY OF CREATININE: CPT | Performed by: STUDENT IN AN ORGANIZED HEALTH CARE EDUCATION/TRAINING PROGRAM

## 2024-07-16 RX ORDER — IOPAMIDOL 755 MG/ML
67 INJECTION, SOLUTION INTRAVASCULAR ONCE
Status: COMPLETED | OUTPATIENT
Start: 2024-07-16 | End: 2024-07-16

## 2024-07-16 RX ORDER — LABETALOL HYDROCHLORIDE 5 MG/ML
20 INJECTION, SOLUTION INTRAVENOUS ONCE
Status: COMPLETED | OUTPATIENT
Start: 2024-07-16 | End: 2024-07-16

## 2024-07-16 RX ORDER — HYDRALAZINE HYDROCHLORIDE 20 MG/ML
10 INJECTION INTRAMUSCULAR; INTRAVENOUS EVERY 10 MIN PRN
Status: DISCONTINUED | OUTPATIENT
Start: 2024-07-16 | End: 2024-07-16

## 2024-07-16 RX ORDER — LABETALOL HYDROCHLORIDE 5 MG/ML
10 INJECTION, SOLUTION INTRAVENOUS EVERY 10 MIN PRN
Status: DISCONTINUED | OUTPATIENT
Start: 2024-07-16 | End: 2024-07-16

## 2024-07-16 RX ORDER — SODIUM CHLORIDE 9 MG/ML
INJECTION, SOLUTION INTRAVENOUS CONTINUOUS PRN
Status: DISCONTINUED | OUTPATIENT
Start: 2024-07-16 | End: 2024-07-18 | Stop reason: HOSPADM

## 2024-07-16 RX ORDER — LABETALOL HYDROCHLORIDE 5 MG/ML
10-20 INJECTION, SOLUTION INTRAVENOUS EVERY 10 MIN PRN
Status: DISCONTINUED | OUTPATIENT
Start: 2024-07-16 | End: 2024-07-18 | Stop reason: HOSPADM

## 2024-07-16 RX ORDER — LIDOCAINE 40 MG/G
CREAM TOPICAL
Status: DISCONTINUED | OUTPATIENT
Start: 2024-07-16 | End: 2024-07-18 | Stop reason: HOSPADM

## 2024-07-16 RX ORDER — HYDRALAZINE HYDROCHLORIDE 20 MG/ML
10-20 INJECTION INTRAMUSCULAR; INTRAVENOUS EVERY 30 MIN PRN
Status: DISCONTINUED | OUTPATIENT
Start: 2024-07-16 | End: 2024-07-18 | Stop reason: HOSPADM

## 2024-07-16 RX ADMIN — NICARDIPINE HYDROCHLORIDE 2.5 MG/HR: 0.2 INJECTION, SOLUTION INTRAVENOUS at 18:02

## 2024-07-16 RX ADMIN — Medication 14.5 MG: at 18:07

## 2024-07-16 RX ADMIN — LABETALOL HYDROCHLORIDE 20 MG: 5 INJECTION, SOLUTION INTRAVENOUS at 17:50

## 2024-07-16 RX ADMIN — IOPAMIDOL 67 ML: 755 INJECTION, SOLUTION INTRAVENOUS at 17:34

## 2024-07-16 ASSESSMENT — ACTIVITIES OF DAILY LIVING (ADL)
ADLS_ACUITY_SCORE: 35

## 2024-07-16 ASSESSMENT — COLUMBIA-SUICIDE SEVERITY RATING SCALE - C-SSRS
2. HAVE YOU ACTUALLY HAD ANY THOUGHTS OF KILLING YOURSELF IN THE PAST MONTH?: NO
6. HAVE YOU EVER DONE ANYTHING, STARTED TO DO ANYTHING, OR PREPARED TO DO ANYTHING TO END YOUR LIFE?: NO
1. IN THE PAST MONTH, HAVE YOU WISHED YOU WERE DEAD OR WISHED YOU COULD GO TO SLEEP AND NOT WAKE UP?: NO

## 2024-07-16 NOTE — ED NOTES
Per verbal order from stroke neurologist, at bedside via video monitor, titrate cardene every 2 minutes for a goal SBP of 150-180
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
## 2024-07-16 NOTE — ED PROVIDER NOTES
EMERGENCY DEPARTMENT ENCOUNTER      NAME: Marifer Temple  AGE: 74 year old female  YOB: 1950  MRN: [id number]  EVALUATION DATE & TIME: No admission date for patient encounter.    PCP: Siomara Ott    ED PROVIDER: Dannie Mcknight MD      Chief Complaint   Patient presents with    Left arm weakness    Aphasia    Facial Droop         FINAL IMPRESSION:  1. Acute CVA (cerebrovascular accident) (H)    2. Left arm weakness          ED COURSE & MEDICAL DECISION MAKING:    Pertinent Labs & Imaging studies reviewed. (See chart for details)  74 year old female presents to the Emergency Department for evaluation of facial droop.    ED Course as of 07/16/24 2141 Tue Jul 16, 2024   1740 Patient is a 74-year-old female with history of hypertension presented to the emergency department for evaluation of left-sided arm/hand numbness and weakness as well as possibly some speech difficulties.  Onset of the symptoms around 330.  Patient noted that she felt like she was having difficulty lifting water container to water the plants with her left arm and felt very weak as well as possibly some tingling.  Around this time she also felt like she was having some speech difficulties and getting words out.  Also noted some left arm weakness when she was in the car on the way to the hospital.  States that since the symptoms started they do seem to be improving spontaneously she is uncertain if she still feels weak at all in the left arm.  She is notably hypertensive on triage vitals but denies taking any blood pressure medications.  She is not on any blood thinners.  No prior history of CVA.  Denies any headache or neck pain or chest pain.    Given symptoms and onset time was called for stat neuroevaluation in triage.  On my evaluation patient questionably has some drooping of the left side of the mouth and slight pronator drift.  Normal coordination with finger-nose.  No lower extremity weakness.  Intact sensation to touch in  bilateral upper and lower extremities as well as bilateral face.  I do not appreciate any slurred speech.  Tongue protrudes midline.  Able to accurately name pen and watch.  I would give her an NIH of 2 for slight facial droop and pronator drift.  Tier 1 stroke called.       1740 Received a call from radiology.  No large vessel occlusion or acute intracranial hemorrhage.  Questionably a subacute infarct anterior limb of the left basal ganglia.   1800 After stroke or evaluation they do plan for administration of tenecteplase as she continues to have left-sided facial droop on exam.  She is markedly hypertensive here and  will start nicardipine.  Once blood pressure adequately controlled plan to administer tenecteplase and patient will require ICU admission thereafter.   1820 Shows a sinus rhythm at 80 beats a minute, normal axis, normal CT, QRS and QTc durations, no ST elevation or acute ischemic changes.     Patient responded well to nicardipine and blood pressure [facility].  Given tenecteplase at 1807.  Blood pressure shortly downtrended on nicardipine it was eventually held patient exactly maintaining blood pressure less than 180.  Discussed patient with both intensivist and hospitalist and she will be moved to the ICU for close neurological monitoring and further evaluation of her stroke.        5:10 PM I met and evaluated the patient.   5:15 PM Tier 1 stroke code.  5:18 PM I spoke to stroke neuro.  5:35 PM I spoke to radiology.    Medical Decision Making  Obtained supplemental history:Supplemental history obtained?: Documented in chart  Reviewed external records: External records reviewed?: Documented in chart  Care impacted by chronic illness:N/A  Care significantly affected by social determinants of health:Access to Medical Care  Did you consider but not order tests?: Work up considered but not performed and documented in chart, if applicable  Did you interpret images independently?: Independent interpretation  of ECG and images noted in documentation, when applicable.  Consultation discussion with other provider:Did you involve another provider (consultant, , pharmacy, etc.)?: I discussed the care with another health care provider, see documentation for details.  Admit.          At the conclusion of the encounter I discussed the results of all of the tests and the disposition. The questions were answered. The patient or family acknowledged understanding and was agreeable with the care plan.     32 minutes of critical care time     MEDICATIONS GIVEN IN THE EMERGENCY:  Medications   iopamidol (ISOVUE-370) solution 67 mL (67 mLs Intravenous $Given 7/16/24 1734)     And   sodium chloride 0.9 % bag for CT scan flush use (has no administration in time range)   sodium chloride 0.9 % infusion (has no administration in time range)   labetalol (NORMODYNE/TRANDATE) injection 10 mg (has no administration in time range)     Or   hydrALAZINE (APRESOLINE) injection 10 mg (has no administration in time range)   niCARdipine 40 mg in 200 mL NS (CARDENE) infusion (5 mg/hr Intravenous Rate/Dose Change 7/16/24 2110)   labetalol (NORMODYNE/TRANDATE) injection 20 mg (20 mg Intravenous $Given 7/16/24 1750)   tenecteplase (TNKase) injection 14.5 mg (14.5 mg Intravenous $Given 7/16/24 1807)       NEW PRESCRIPTIONS STARTED AT TODAY'S ER VISIT  Current Discharge Medication List             =================================================================    HPI    Patient information was obtained from: patient    Use of : N/A        Marifer Temple is a 74 year old female with no pertinent history who presents to this ED via walk-in for evaluation of facial droop.    At 1530, the patient was getting out the car after a long car ride when she started to have left arm tingling, speech difficulty, and left facial droop. She currently does not have speech difficulty.    Denies chest pain, neck pain, leg weakness, abdominal pain, nausea,  "vomiting, headache, blood thinners, history of stroke, medical problems, or any other complaints at this time.    REVIEW OF SYSTEMS   Refer to the HPI    PAST MEDICAL HISTORY:  No past medical history on file.    PAST SURGICAL HISTORY:  Past Surgical History:   Procedure Laterality Date    OTHER SURGICAL HISTORY      bowel obstruction pediatric    OTHER SURGICAL HISTORY      s/p partial colectomy as a child    OTHER SURGICAL HISTORY      right carpal  tunnel release           CURRENT MEDICATIONS:    No current outpatient medications on file.      ALLERGIES:  No Known Allergies    FAMILY HISTORY:  Family History   Problem Relation Age of Onset    Leukemia Mother     Hypertension Mother     Colon Cancer Father     Atrial fibrillation Sister     Rheumatoid Arthritis Sister     Breast Cancer Maternal Aunt        SOCIAL HISTORY:   Social History     Socioeconomic History    Marital status:    Tobacco Use    Smoking status: Never    Smokeless tobacco: Never   Social History Narrative    Lives with  .   Likes to travel ,   Worked in  bank          VITALS:  BP (!) 191/91   Pulse 68   Temp 98.7  F (37.1  C) (Oral)   Resp 21   Ht 1.651 m (5' 5\")   Wt 58.5 kg (128 lb 14.4 oz)   SpO2 96%   BMI 21.45 kg/m      PHYSICAL EXAM    Constitutional: Well developed, Well nourished, NAD,  HENT: Normocephalic, Atraumatic,  mucous membranes moist,   Neck- trachea midline, No stridor.    Eyes:EOMI, Conjunctiva normal, No discharge.   Respiratory: Normal breath sounds, No respiratory distress, No wheezing.    Cardiovascular: Normal heart rate, Regular rhythm, No murmurs,   Abdominal: Soft, No tenderness, No rebound or guarding.     Musculoskeletal: no deformity or malalignment   Integument: Warm, Dry, No erythema,    Neurologic: Alert & oriented x 3, slight droop at the left side of the mouth, pronator drift in the left hand  National Institutes of Health Stroke Scale  Exam Interval: Baseline   Score    Level of " consciousness: (0)   Alert, keenly responsive    LOC questions: (0)   Answers both questions correctly    LOC commands: (0)   Performs both tasks correctly    Best gaze: (0)   Normal    Visual: (0)   No visual loss    Facial palsy: (1)   Minor paralysis (flat nasolabial fold, smile asymmetry)    Motor arm (left): (0)   No drift    Motor arm (right): (1)   Drift    Motor leg (left): (0)   No drift    Motor leg (right): (0)   No drift    Limb ataxia: (0)   Absent    Sensory: (0)   Normal- no sensory loss    Best language: (0)   Normal- no aphasia    Dysarthria: (0)   Normal    Extinction and inattention: (0)   No abnormality        Total Score:  2      Psychiatric: Affect normal, Cooperative.     LAB:  All pertinent labs reviewed and interpreted.  Results for orders placed or performed during the hospital encounter of 07/16/24   CTA Head Neck with Contrast    Impression    IMPRESSION:   HEAD CT:  1.  Mild asymmetric hypodensity involving anterior aspect of left basal ganglia with involvement of anterior limb of left internal capsule. This could be due to a chronic ischemic change however subacute ischemia could have similar appearance.  2.  No hemorrhage, mass, or mass effect.  3.  Moderate presumed chronic small vessel ischemia.  4.  Mild atrophy.    HEAD CTA:   1.  Normal CTA [place].    NECK CTA:  1.  Normal neck CTA.  2.  14 mm nodule left thyroid gland; ultrasound recommended, if not done previously.    Results were called to Dr. Mcknight at 7/16/2024 5:34 PM CDT.   Basic metabolic panel   Result Value Ref Range    Sodium 141 135 - 145 mmol/L    Potassium 3.9 3.4 - 5.3 mmol/L    Chloride 105 98 - 107 mmol/L    Carbon Dioxide (CO2) 25 22 - 29 mmol/L    Anion Gap 11 7 - 15 mmol/L    Urea Nitrogen 25.3 (H) 8.0 - 23.0 mg/dL    Creatinine 1.08 (H) 0.51 - 0.95 mg/dL    GFR Estimate 54 (L) >60 mL/min/1.73m2    Calcium 9.2 8.8 - 10.4 mg/dL    Glucose 98 70 - 99 mg/dL   Result Value Ref Range    INR 1.03 0.85 - 1.15    Partial thromboplastin time   Result Value Ref Range    aPTT 30 22 - 38 Seconds   Result Value Ref Range    Troponin T, High Sensitivity 65 (H) <=14 ng/L   Glucose by meter   Result Value Ref Range    GLUCOSE BY METER POCT 81 70 - 99 mg/dL   CBC with platelets and differential   Result Value Ref Range    WBC Count 11.4 (H) 4.0 - 11.0 10e3/uL    RBC Count 4.49 3.80 - 5.20 10e6/uL    Hemoglobin 13.4 11.7 - 15.7 g/dL    Hematocrit 39.9 35.0 - 47.0 %    MCV 89 78 - 100 fL    MCH 29.8 26.5 - 33.0 pg    MCHC 33.6 31.5 - 36.5 g/dL    RDW 13.8 10.0 - 15.0 %    Platelet Count 200 150 - 450 10e3/uL    % Neutrophils 82 %    % Lymphocytes 10 %    % Monocytes 7 %    % Eosinophils 1 %    % Basophils 0 %    % Immature Granulocytes 0 %    NRBCs per 100 WBC 0 <1 /100    Absolute Neutrophils 9.3 (H) 1.6 - 8.3 10e3/uL    Absolute Lymphocytes 1.1 0.8 - 5.3 10e3/uL    Absolute Monocytes 0.8 0.0 - 1.3 10e3/uL    Absolute Eosinophils 0.1 0.0 - 0.7 10e3/uL    Absolute Basophils 0.0 0.0 - 0.2 10e3/uL    Absolute Immature Granulocytes 0.0 <=0.4 10e3/uL    Absolute NRBCs 0.0 10e3/uL   Result Value Ref Range    Troponin T, High Sensitivity 64 (H) <=14 ng/L       RADIOLOGY:  Reviewed all pertinent imaging. Please see official radiology report.  CTA Head Neck with Contrast   Final Result   IMPRESSION:    HEAD CT:   1.  Mild asymmetric hypodensity involving anterior aspect of left basal ganglia with involvement of anterior limb of left internal capsule. This could be due to a chronic ischemic change however subacute ischemia could have similar appearance.   2.  No hemorrhage, mass, or mass effect.   3.  Moderate presumed chronic small vessel ischemia.   4.  Mild atrophy.      HEAD CTA:    1.  Normal CTA [place].      NECK CTA:   1.  Normal neck CTA.   2.  14 mm nodule left thyroid gland; ultrasound recommended, if not done previously.      Results were called to Dr. Mcknight at 7/16/2024 5:34 PM CDT.          EKG:      Impression: Shows a  sinus rhythm at 80 beats a minute, normal axis, normal DC, QRS and QTc durations, no ST elevation or acute ischemic changes.        I have independently reviewed and interpreted the EKG(s) documented above.    PROCEDURES:   None.      [employer] [facility] Documentation:   CMS Diagnoses: The patient has stroke symptoms:         ED Stroke specific documentation           NIHSS PDF     Patient last known well time: 1530  ED Provider first to bedside at: 1710  CT Results received at: 1740    Thrombolytics:   Risks (including potential for bleeding and death), benefits, and alternatives to thrombolytic therapy were discussed with Patient and Family. Prior to tenecteplase (TNK) administration, the following issues were addressed:   - hypertension requiring aggressive control with IV medications    If treating with thrombolytics: Ensure SBP<180 and DBP<105 prior to treatment with thrombolytics.  Administering thrombolytics after treatment with IV labetalol, hydralazine, or nicardipine is reasonable once BP control is established.    Endovascular Retrieval:  Not initiated due to absence of proximal vessel occlusion    National Institutes of Health Stroke Scale (Baseline)  Time Performed: 1740 see above in physical exam         Stroke Mimics were considered (including migraine headache, seizure disorder, hypoglycemia (or hyperglycemia), head or spinal trauma, CNS infection, Toxin ingestion and shock state (e.g. sepsis) .                      I, Lalo Sanchez, am serving as a scribe to document services personally performed by Dannie Mcknight MD based on my observation and the provider's statements to me. I, Dannie Mcknight MD, attest that Lalo Sanchez is acting in a scribe capacity, has observed my performance of the services and has documented them in accordance with my direction.    Dannie Mcknight MD  [facility] EMERGENCY DEPARTMENT  [address] [place] [id number] [phone]      Dannie Mcknight,  MD  07/16/24 9834

## 2024-07-16 NOTE — ED TRIAGE NOTES
Pt c/o left arm weakness, left-sided facial droop, and slurred speech starting at 1530 today. [place] and she noticed her left arm being very weak and unable to lift up bucket. Pt's BP in triage is 235/109 in triage. Pt denies history of HTN. Pt denies headache. Blood sugar is 81. Tier 1 stroke code called by Dr. Mcknight.      Triage Assessment (Adult)       Row Name 07/16/24 1714          Triage Assessment    Airway WDL WDL        Respiratory WDL    Respiratory WDL WDL        Skin Circulation/Temperature WDL    Skin Circulation/Temperature WDL WDL        Cardiac WDL    Cardiac WDL WDL        Peripheral/Neurovascular WDL    Peripheral Neurovascular WDL WDL        Cognitive/Neuro/Behavioral WDL    Cognitive/Neuro/Behavioral WDL WDL

## 2024-07-17 ENCOUNTER — APPOINTMENT (OUTPATIENT)
Dept: ULTRASOUND IMAGING | Facility: HOSPITAL | Age: 74
DRG: 062 | End: 2024-07-17
Attending: FAMILY MEDICINE
Payer: COMMERCIAL

## 2024-07-17 ENCOUNTER — APPOINTMENT (OUTPATIENT)
Dept: CARDIOLOGY | Facility: HOSPITAL | Age: 74
DRG: 062 | End: 2024-07-17
Attending: FAMILY MEDICINE
Payer: COMMERCIAL

## 2024-07-17 ENCOUNTER — APPOINTMENT (OUTPATIENT)
Dept: PHYSICAL THERAPY | Facility: HOSPITAL | Age: 74
DRG: 062 | End: 2024-07-17
Attending: FAMILY MEDICINE
Payer: COMMERCIAL

## 2024-07-17 ENCOUNTER — APPOINTMENT (OUTPATIENT)
Dept: OCCUPATIONAL THERAPY | Facility: HOSPITAL | Age: 74
DRG: 062 | End: 2024-07-17
Attending: FAMILY MEDICINE
Payer: COMMERCIAL

## 2024-07-17 ENCOUNTER — APPOINTMENT (OUTPATIENT)
Dept: SPEECH THERAPY | Facility: HOSPITAL | Age: 74
DRG: 062 | End: 2024-07-17
Attending: FAMILY MEDICINE
Payer: COMMERCIAL

## 2024-07-17 ENCOUNTER — APPOINTMENT (OUTPATIENT)
Dept: MRI IMAGING | Facility: HOSPITAL | Age: 74
DRG: 062 | End: 2024-07-17
Attending: FAMILY MEDICINE
Payer: COMMERCIAL

## 2024-07-17 ENCOUNTER — APPOINTMENT (OUTPATIENT)
Dept: CT IMAGING | Facility: HOSPITAL | Age: 74
DRG: 062 | End: 2024-07-17
Attending: FAMILY MEDICINE
Payer: COMMERCIAL

## 2024-07-17 LAB
ANION GAP SERPL CALCULATED.3IONS-SCNC: 12 MMOL/L (ref 7–15)
APTT PPP: 28 SECONDS (ref 22–38)
ATRIAL RATE - MUSE: 71 BPM
BUN SERPL-MCNC: 18.5 MG/DL (ref 8–23)
CALCIUM SERPL-MCNC: 8.9 MG/DL (ref 8.8–10.4)
CHLORIDE SERPL-SCNC: 108 MMOL/L (ref 98–107)
CHOLEST SERPL-MCNC: 160 MG/DL
CREAT SERPL-MCNC: 0.99 MG/DL (ref 0.51–0.95)
DIASTOLIC BLOOD PRESSURE - MUSE: NORMAL MMHG
EGFRCR SERPLBLD CKD-EPI 2021: 60 ML/MIN/1.73M2
ERYTHROCYTE [DISTWIDTH] IN BLOOD BY AUTOMATED COUNT: 13.8 % (ref 10–15)
GLUCOSE BLDC GLUCOMTR-MCNC: 114 MG/DL (ref 70–99)
GLUCOSE BLDC GLUCOMTR-MCNC: 93 MG/DL (ref 70–99)
GLUCOSE BLDC GLUCOMTR-MCNC: 94 MG/DL (ref 70–99)
GLUCOSE SERPL-MCNC: 105 MG/DL (ref 70–99)
HBA1C MFR BLD: 5.1 %
HCO3 SERPL-SCNC: 27 MMOL/L (ref 22–29)
HCT VFR BLD AUTO: 39.6 % (ref 35–47)
HDLC SERPL-MCNC: 78 MG/DL
HGB BLD-MCNC: 13.1 G/DL (ref 11.7–15.7)
INR PPP: 1.06 (ref 0.85–1.15)
INTERPRETATION ECG - MUSE: NORMAL
LDLC SERPL CALC-MCNC: 67 MG/DL
MCH RBC QN AUTO: 29.3 PG (ref 26.5–33)
MCHC RBC AUTO-ENTMCNC: 33.1 G/DL (ref 31.5–36.5)
MCV RBC AUTO: 89 FL (ref 78–100)
NONHDLC SERPL-MCNC: 82 MG/DL
P AXIS - MUSE: 51 DEGREES
PLATELET # BLD AUTO: 201 10E3/UL (ref 150–450)
POTASSIUM SERPL-SCNC: 4.1 MMOL/L (ref 3.4–5.3)
PR INTERVAL - MUSE: 130 MS
QRS DURATION - MUSE: 76 MS
QT - MUSE: 418 MS
QTC - MUSE: 454 MS
R AXIS - MUSE: -6 DEGREES
RBC # BLD AUTO: 4.47 10E6/UL (ref 3.8–5.2)
SODIUM SERPL-SCNC: 147 MMOL/L (ref 135–145)
SYSTOLIC BLOOD PRESSURE - MUSE: NORMAL MMHG
T AXIS - MUSE: 43 DEGREES
TRIGL SERPL-MCNC: 73 MG/DL
TSH SERPL DL<=0.005 MIU/L-ACNC: 2.48 UIU/ML (ref 0.3–4.2)
VENTRICULAR RATE- MUSE: 71 BPM
WBC # BLD AUTO: 10.2 10E3/UL (ref 4–11)

## 2024-07-17 PROCEDURE — 85027 COMPLETE CBC AUTOMATED: CPT | Performed by: FAMILY MEDICINE

## 2024-07-17 PROCEDURE — 120N000001 HC R&B MED SURG/OB

## 2024-07-17 PROCEDURE — 97535 SELF CARE MNGMENT TRAINING: CPT | Mod: GO

## 2024-07-17 PROCEDURE — 97116 GAIT TRAINING THERAPY: CPT | Mod: GP

## 2024-07-17 PROCEDURE — 99233 SBSQ HOSP IP/OBS HIGH 50: CPT | Performed by: PSYCHIATRY & NEUROLOGY

## 2024-07-17 PROCEDURE — 97110 THERAPEUTIC EXERCISES: CPT | Mod: GO

## 2024-07-17 PROCEDURE — 97166 OT EVAL MOD COMPLEX 45 MIN: CPT | Mod: GO

## 2024-07-17 PROCEDURE — 36415 COLL VENOUS BLD VENIPUNCTURE: CPT | Performed by: FAMILY MEDICINE

## 2024-07-17 PROCEDURE — 70551 MRI BRAIN STEM W/O DYE: CPT

## 2024-07-17 PROCEDURE — 80061 LIPID PANEL: CPT | Performed by: FAMILY MEDICINE

## 2024-07-17 PROCEDURE — 83036 HEMOGLOBIN GLYCOSYLATED A1C: CPT | Performed by: FAMILY MEDICINE

## 2024-07-17 PROCEDURE — 97161 PT EVAL LOW COMPLEX 20 MIN: CPT | Mod: GP

## 2024-07-17 PROCEDURE — 97110 THERAPEUTIC EXERCISES: CPT | Mod: GP

## 2024-07-17 PROCEDURE — 92610 EVALUATE SWALLOWING FUNCTION: CPT | Mod: GN

## 2024-07-17 PROCEDURE — 93306 TTE W/DOPPLER COMPLETE: CPT

## 2024-07-17 PROCEDURE — 85610 PROTHROMBIN TIME: CPT | Performed by: FAMILY MEDICINE

## 2024-07-17 PROCEDURE — 70450 CT HEAD/BRAIN W/O DYE: CPT

## 2024-07-17 PROCEDURE — 80048 BASIC METABOLIC PNL TOTAL CA: CPT | Performed by: FAMILY MEDICINE

## 2024-07-17 PROCEDURE — 99233 SBSQ HOSP IP/OBS HIGH 50: CPT | Performed by: STUDENT IN AN ORGANIZED HEALTH CARE EDUCATION/TRAINING PROGRAM

## 2024-07-17 PROCEDURE — 84443 ASSAY THYROID STIM HORMONE: CPT | Performed by: FAMILY MEDICINE

## 2024-07-17 PROCEDURE — 85730 THROMBOPLASTIN TIME PARTIAL: CPT | Performed by: FAMILY MEDICINE

## 2024-07-17 PROCEDURE — 93306 TTE W/DOPPLER COMPLETE: CPT | Mod: 26 | Performed by: INTERNAL MEDICINE

## 2024-07-17 PROCEDURE — 76536 US EXAM OF HEAD AND NECK: CPT

## 2024-07-17 ASSESSMENT — ACTIVITIES OF DAILY LIVING (ADL)
ADLS_ACUITY_SCORE: 27
ADLS_ACUITY_SCORE: 35
ADLS_ACUITY_SCORE: 35
ADLS_ACUITY_SCORE: 26
ADLS_ACUITY_SCORE: 27
ADLS_ACUITY_SCORE: 35
ADLS_ACUITY_SCORE: 27
PREVIOUS_RESPONSIBILITIES: MEAL PREP;HOUSEKEEPING;LAUNDRY;SHOPPING;MEDICATION MANAGEMENT;FINANCES;DRIVING
ADLS_ACUITY_SCORE: 27
ADLS_ACUITY_SCORE: 35
ADLS_ACUITY_SCORE: 27
ADLS_ACUITY_SCORE: 27
ADLS_ACUITY_SCORE: 26
ADLS_ACUITY_SCORE: 35
ADLS_ACUITY_SCORE: 35
ADLS_ACUITY_SCORE: 27
ADLS_ACUITY_SCORE: 35
ADLS_ACUITY_SCORE: 27
ADLS_ACUITY_SCORE: 35
ADLS_ACUITY_SCORE: 35
ADLS_ACUITY_SCORE: 27

## 2024-07-17 NOTE — PLAN OF CARE
Verbal phone report given to MARQUITA Shoemaker at 1736. Patient and spouse notified of transfer to room 106. Belongings packed up by spouse. Patient taken to CT at this time and will be taken to room 106 at completion of scan. Patient and spouse aware of plan, along with radiology transport and P1. Belongings, chart and medications take to P1.

## 2024-07-17 NOTE — PROGRESS NOTES
07/17/24 1031   Appointment Info   Signing Clinician's Name / Credentials (OT) Isa Jones OT   Living Environment   People in Home spouse   Current Living Arrangements house   Home Accessibility stairs to enter home;stairs within home   Number of Stairs, Main Entrance 3   Stair Railings, Main Entrance railings safe and in good condition   Number of Stairs, Within Home, Primary greater than 10 stairs   Stair Railings, Within Home, Primary railings safe and in good condition   Transportation Anticipated family or friend will provide   Living Environment Comments pt was independent w/her ADLs and mobility   Self-Care   Usual Activity Tolerance good   Current Activity Tolerance moderate   Equipment Currently Used at Home none   Fall history within last six months no   Instrumental Activities of Daily Living (IADL)   Previous Responsibilities meal prep;housekeeping;laundry;shopping;medication management;finances;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   Existing Precautions/Restrictions no known precautions/restrictions   Left Upper Extremity (Weight-bearing Status) full weight-bearing (FWB)   Right Upper Extremity (Weight-bearing Status) full weight-bearing (FWB)   Left Lower Extremity (Weight-bearing Status) full weight-bearing (FWB)   Right Lower Extremity (Weight-bearing Status) full weight-bearing (FWB)   Cognitive Status Examination   Orientation Status orientation to person, place and time   Follows Commands WNL   Visual Perception   Visual Impairment/Limitations WNL   Sensory   Sensory Quick Adds sensation intact   Pain Assessment   Patient Currently in  Pain No   Posture   Posture not impaired   Range of Motion Comprehensive   General Range of Motion no range of motion deficits identified   Strength Comprehensive (MMT)   General Manual Muscle Testing (MMT) Assessment no strength deficits identified   Muscle Tone Assessment   Muscle Tone Quick Adds No deficits were identified   Coordination   Upper Extremity Coordination Left UE impaired   Fine Motor Coordination limited in L hand   Bed Mobility   Bed Mobility supine-sit   Supine-Sit Coleman (Bed Mobility) supervision   Transfers   Transfers bed-chair transfer   Transfer Skill: Bed to Chair/Chair to Bed   Bed-Chair Coleman (Transfers) contact guard   Transfer Comments no device needed   Balance   Balance Assessment standing dynamic balance   Standing Balance: Dynamic contact guard   Activities of Daily Living   BADL Assessment/Intervention lower body dressing   Lower Body Dressing Assessment/Training   Coleman Level (Lower Body Dressing) contact guard assist   Clinical Impression   Criteria for Skilled Therapeutic Interventions Met (OT) Yes, treatment indicated   OT Diagnosis CVA   Influenced by the following impairments fatigue, decreased ADLs/balance   OT Problem List-Impairments impacting ADL balance;activity tolerance impaired   Assessment of Occupational Performance 3-5 Performance Deficits   Identified Performance Deficits fatigue, decreased AdlS/BALANCE   Planned Therapy Interventions (OT) ADL retraining   Clinical Decision Making Complexity (OT) detailed assessment/moderate complexity   Risk & Benefits of therapy have been explained evaluation/treatment results reviewed;care plan/treatment goals reviewed;risks/benefits reviewed;participants voiced agreement with care plan   OT Total Evaluation Time   OT Eval, Moderate Complexity Minutes (25092) 10   OT Goals   Therapy Frequency (OT) Daily   OT Predicted Duration/Target Date for Goal Attainment 07/23/24   OT Goals Hygiene/Grooming;Lower Body  Dressing;Toilet Transfer/Toileting;OT Goal 1   OT: Hygiene/Grooming modified independent   OT: Lower Body Dressing Modified independent   OT: Toilet Transfer/Toileting Modified independent   OT: Goal 1 Pt to be independent w/self theraputty ex for LUE for home use   Interventions   Interventions Quick Adds Self-Care/Home Management;Therapeutic Procedures/Exercise   Self-Care/Home Management   Self-Care/Home Mgmt/ADL, Compensatory, Meal Prep Minutes (09131) 10   Symptoms Noted During/After Treatment (Meal Preparation/Planning Training) fatigue   Treatment Detail/Skilled Intervention transfers CGA w/out a device w/cues to use hands for safe transfer   Therapeutic Procedures/Exercise   Therapeutic Procedure: strength, endurance, ROM, flexibillity minutes (07054) 10   Symptoms Noted During/After Treatment fatigue   Treatment Detail/Skilled Intervention LUE theraputty ex w/instruction 5 reps x7 ex w/min A   OT Discharge Planning   OT Plan transfers/toileting, G/H, LB dress, L hand theraputty in room   OT Discharge Recommendation (DC Rec) home with assist   OT Rationale for DC Rec pt is safe to return home w/family support   OT Brief overview of current status CGA w/ADLs and mobility

## 2024-07-17 NOTE — PROGRESS NOTES
SPEECH PATHOLOGY CLINICAL SWALLOW EVALUATION     07/17/24 5032   Appointment Info   Signing Clinician's Name / Credentials (SLP) Rogelio Humphrey MA [facility] SLP   General Information   Onset of Illness/Injury or Date of Surgery 07/16/24   Patient/Family Therapy Goal Statement (SLP) to eat/drink   Pertinent History of Current Problem 74 yr old female admitted w/acute onset let hand weakness, with slurred speech and facial droop leading to TNK after presenting to the ED.  Imaging has confirmed an acute infarction of the L-BG and absent other vascular issues (stenosis, dissection, occlusion, aneurysm).  Unclear etiology given her relatively healthy state, and limited stroke risk factors.   General Observations Pt alert, reports improved speech.   Type of Evaluation   Type of Evaluation Swallow Evaluation   Oral Motor   Oral Musculature generally intact   Structural Abnormalities none present   Mucosal Quality adequate   Dentition (Oral Motor)   Dentition (Oral Motor) natural dentition;adequate dentition   Facial Symmetry (Oral Motor)   Facial Symmetry (Oral Motor) WNL   Lip Function (Oral Motor)   Lip Range of Motion (Oral Motor) WNL   Lip Strength (Oral Motor) WFL   Tongue Function (Oral Motor)   Tongue Strength (Oral Motor) WNL   Tongue Coordination/Speed (Oral Motor) WNL   Tongue ROM (Oral Motor) WNL   Jaw Function (Oral Motor)   Jaw Function (Oral Motor) WNL   Vocal Quality/Secretion Management (Oral Motor)   Vocal Quality (Oral Motor) WFL   Secretion Management (Oral Motor) WNL   General Swallowing Observations   Past History of Dysphagia none   Respiratory Support room air   Current Diet/Method of Nutritional Intake (General Swallowing Observations, NIS) NPO   Swallowing Evaluation Clinical swallow evaluation   Clinical Swallow Evaluation   Feeding Assistance no assistance needed   Clinical Swallow Evaluation Textures Trialed thin liquids;solid foods   Clinical Swallow Eval: Thin Liquid Texture Trial   Mode of  Presentation, Thin Liquids straw;self-fed   Volume of Liquid or Food Presented 4 oz   Oral Phase of Swallow WFL   Pharyngeal Phase of Swallow intact   Clinical Swallow Evaluation: Solid Food Texture Trial   Mode of Presentation self-fed   Volume Presented jess crackers   Oral Phase WFL   Pharyngeal Phase intact   Esophageal Phase of Swallow   Patient reports or presents with symptoms of esophageal dysphagia No   Swallowing Recommendations   Diet Consistency Recommendations thin liquids (level 0);regular diet   Supervision Level for Intake patient independent   Instrumental Assessment Recommendations instrumental evaluation not recommended at this time   Comment, Swallowing Recommendations No current symptoms of dysarthria, asphasia, or dysphagia. Regular diet texture and thin liquids, no further SLP indicated.   Clinical Impression   Criteria for Skilled Therapeutic Interventions Met (SLP Eval) Evaluation only   Clinical Impression Comments Pt is alert and conversant. She has no current dysarthria, no observed aphasia. She is able to feed herself. She demonstrates good rate of intake and appropriate bolus size. Mastication is thorough and there is no oral stasis. No overt s/s aspiration with any consistency even with rapid consecutive swallows. Oral motor function is currently WNL without facial droop or dysarthria.   SLP Total Evaluation Time   Eval: oral/pharyngeal swallow function, clinical swallow Minutes (46882) 18   SLP Discharge Planning   SLP Plan eval  only   SLP Rationale for DC Rec no ongoing SLP needs   SLP Brief overview of current status  No current symptoms of dysarthria, asphasia, or dysphagia. Regular diet texture and thin liquids, no further SLP indicated.

## 2024-07-17 NOTE — CONSULTS
[facility] Neurology Consultation    Marifer Temple MRN# [id number]   Age: 74 year old YOB: 1950     Requesting physician: No ref. provider found  Namrata Siomara A     Reason for Consultation: CVA     Assessment and Plan:   Assessment:  L-basal ganglia infarction, acute    The patient had acute onset let hand weakness, with slurred speech and facial droop leading to TNK after presenting to the ED.  Imaging has confirmed an acute infarction of the L-BG and absent other vascular issues (stenosis, dissection, occlusion, aneurysm).  Unclear etiology given her relatively healthy state, and limited stroke risk factors.  Full w/up following, but likely to require start of ASA after repeat head CT hopefully shows stable/unchanged findings.     Plan:  - Repeat Head CT today (24 hours after TNK)  - If CT is w/out change, start ASA 81 mg every day  - /105 for short term   - long term goals, after discharge would be under 130/85  - Statin therapy not necessarily needed given LDL is w/in goals (under 70)  - A1c goals under 7.0  - TTE  - Telemetry  - PT/OT/SLP  - Apnea screen, depression screen  - Euglycemia, Euthermia    EMILY Larkin D.O.  [facility] Neurology  Pager: [phone]    Total time today (67 min) in this patient encounter was spent on pre-charting, counseling and/or coordination of care.         History of Presenting Symptoms:   Marifer Temple is a 74 year old female who presents today for evaluation of CVA.  The patient presented 7/16/2024 to the ED with acute onset left hand weakness, slurred speech, facial droop occurring earlier that day.  CT showed left basal ganglia hypodensities. NIHSS was 4. Stroke consultation led to recommendation for TNK, but delivery was delayed slightly due to blood pressure management (initial was 235/109).  She was admitted to ICU with plans to repeat CT head in 24 hours, and obtain further stroke evaluation (MRI brain, TTE).  Consideration of  "platelets starting 24 hours after TNK with stable CT was made.    MRI brain 7/17/2024 showed restricted diffusion of right pre-central gyrus, and small old lacunar infarctions of right IC and lentiform nucleus/posterior limb.  Impression was for acute to subacute right precentral infarction and chronic right internal capsule and posterior limb infarctions.      Past Medical History:   CTS of right s/p release  HTN     Social History:   No alcohol abuse, smoking history.      Medications:   No at home medications     Physical Exam:   Vitals: /68   Pulse 58   Temp 98.4  F (36.9  C) (Oral)   Resp 24   Ht 1.651 m (5' 5\")   Wt 58.5 kg (128 lb 14.4 oz)   SpO2 95%   BMI 21.45 kg/m     General: Seated comfortably in no acute distress.  HEENT: Neck supple with normal range of motion. No paracervical muscle tenderness or tightness.   Skin: No rashes  Neurologic:     Mental Status: Fully alert, attentive and oriented. Speech clear and fluent, no paraphasic errors.      Cranial Nerves: Visual fields intact. PERRL. EOMI with normal smooth pursuit. Facial sensation intact/symmetric. Facial movements symmetric. Hearing not formally tested but intact to conversation. Palate elevation symmetric, uvula midline. No dysarthria. Shoulder shrug strong bilaterally. Tongue protrusion midline.     Motor: No tremors or other abnormal movements observed. Muscle tone normal throughout. Mild pronator drift on left (hand pronation, drift upwards on left). Slowed finger tapping and hand motions on left, normal foot rhythm and motions on left.   Motor:     Right Left   Arm abduction 5/5 5/5   Elbow Flex 5/5 5/5   Elbow Extension 5/5 5/5   Wrist Extension 5/5 5/5   FDI  5/5 5/5   APB 5/5 5/5     5/5 5/5   Hip FLexion 5/5 5/5   Knee Flexion 5/5 5/5   Knee Extension 5/5 5/5   Dorsiflexion  5/5 5/5        Deep Tendon Reflexes: 2+/symmetric throughout upper and lower extremities. No clonus. Toes downgoing bilaterally.     Sensory: " Intact/symmetric to light touch, pinprick, temperature, vibration throughout upper and lower extremities. Negative Romberg.      Coordination: Finger-nose-finger and heel-shin intact without dysmetria. Rapid alternating movements slowed on left hand.     Gait: Deferred in ICU setting.    [employer] Stroke Scale  Exam Interval: @ 0830 7/17/2024   Score    Level of consciousness: (0)   Alert, keenly responsive    LOC questions: (0)   Answers both questions correctly    LOC commands: (0)   Performs both tasks correctly    Best gaze: (0)   Normal    Visual: (0)   No visual loss    Facial palsy: (0)   Normal symmetrical movements    Motor arm (left): (1)   Drift    Motor arm (right): (0)   No drift    Motor leg (left): (0)   No drift    Motor leg (right): (0)   No drift    Limb ataxia: (1)   Present in one limb    Sensory: (0)   Normal- no sensory loss    Best language: (0)   Normal- no aphasia    Dysarthria: (0)   Normal    Extinction and inattention: (0)   No abnormality        Total Score:  2             Data: Pertinent prior to visit   Imaging:  MRI brain: 7/17/2024:  IMPRESSION:  1.  Small late acute to early subacute infarction involving the right precentral gyrus and subcortical white matter.  2.  Small old lacunar type infarction in the dorsal lentiform nucleus/posterior limb of the right internal capsule.  3.  Mild to moderate presumed chronic small vessel ischemic changes.  4.  No additional acute intracranial abnormality.    CTA head and neck: 7/16/2024:  IMPRESSION:   HEAD CT:  1.  Mild asymmetric hypodensity involving anterior aspect of left basal ganglia with involvement of anterior limb of left internal capsule. This could be due to a chronic ischemic change however subacute ischemia could have similar appearance.  2.  No hemorrhage, mass, or mass effect.  3.  Moderate presumed chronic small vessel ischemia.  4.  Mild atrophy.  HEAD CTA:   1.  Normal CTA [place].  NECK CTA:  1.   Normal neck CTA.  2.  14 mm nodule left thyroid gland; ultrasound recommended, if not done previously.    Procedures:  Echocardiogram: 7/16 ordered    Laboratory:  7/17/2024:  LDL 67  A1c 5.1

## 2024-07-17 NOTE — PLAN OF CARE
"Problem: Adult Inpatient Plan of Care  Goal: Plan of Care Review  Description: The Plan of Care Review/Shift note should be completed every shift.  The Outcome Evaluation is a brief statement about your assessment that the patient is improving, declining, or no change.  This information will be displayed automatically on your shift  note.  Outcome: Progressing  Goal: Patient-Specific Goal (Individualized)  Description: You can add care plan individualizations to a care plan. Examples of Individualization might be:  \"Parent requests to be called daily at 9am for status\", \"I have a hard time hearing out of my right ear\", or \"Do not touch me to wake me up as it startles  me\".  Outcome: Progressing  Goal: Absence of Hospital-Acquired Illness or Injury  Outcome: Progressing  Intervention: Identify and Manage Fall Risk  Recent Flowsheet Documentation  Taken 7/17/2024 1600 by Cami Beyer RN  Safety Promotion/Fall Prevention:   activity supervised   clutter free environment maintained   increased rounding and observation   increase visualization of patient   lighting adjusted   nonskid shoes/slippers when out of bed   patient and family education   room door open   room near nurse's station   room organization consistent   safety round/check completed   supervised activity   toileting scheduled  Taken 7/17/2024 1200 by Cami Beyer RN  Safety Promotion/Fall Prevention:   activity supervised   clutter free environment maintained   increased rounding and observation   increase visualization of patient   lighting adjusted   nonskid shoes/slippers when out of bed   patient and family education   room door open   room near nurse's station   room organization consistent   safety round/check completed   supervised activity   toileting scheduled  Taken 7/17/2024 0800 by Cami Beyer RN  Safety Promotion/Fall Prevention:   activity supervised   clutter free environment maintained   increased rounding and " observation   increase visualization of patient   lighting adjusted   nonskid shoes/slippers when out of bed   patient and family education   room door open   room near nurse's station   room organization consistent   safety round/check completed   supervised activity   toileting scheduled  Intervention: Prevent Skin Injury  Recent Flowsheet Documentation  Taken 7/17/2024 1600 by Cami Beyer RN  Body Position: position changed independently  Skin Protection:   skin to device areas padded   skin to skin areas padded  Device Skin Pressure Protection:   absorbent pad utilized/changed   skin-to-device areas padded   skin-to-skin areas padded   tubing/devices free from skin contact   pressure points protected  Taken 7/17/2024 1200 by Cami Beyer RN  Body Position: position changed independently  Skin Protection:   skin to device areas padded   skin to skin areas padded  Device Skin Pressure Protection:   absorbent pad utilized/changed   skin-to-device areas padded   skin-to-skin areas padded   tubing/devices free from skin contact   pressure points protected  Taken 7/17/2024 0800 by Cami Beyer RN  Body Position: position changed independently  Skin Protection:   skin to device areas padded   skin to skin areas padded  Device Skin Pressure Protection:   absorbent pad utilized/changed   skin-to-device areas padded   skin-to-skin areas padded   tubing/devices free from skin contact   pressure points protected  Intervention: Prevent and Manage VTE (Venous Thromboembolism) Risk  Recent Flowsheet Documentation  Taken 7/17/2024 1600 by Cami Beyer RN  VTE Prevention/Management: SCDs on (sequential compression devices)  Taken 7/17/2024 1200 by Cami Beyer RN  VTE Prevention/Management: SCDs on (sequential compression devices)  Taken 7/17/2024 0800 by Cami Beyer RN  VTE Prevention/Management: SCDs on (sequential compression devices)  Intervention: Prevent  Infection  Recent Flowsheet Documentation  Taken 7/17/2024 1600 by Cami Beyer RN  Infection Prevention:   environmental surveillance performed   equipment surfaces disinfected   hand hygiene promoted   personal protective equipment utilized   rest/sleep promoted   single patient room provided   visitors restricted/screened  Taken 7/17/2024 1200 by Cami Beyer RN  Infection Prevention:   environmental surveillance performed   equipment surfaces disinfected   hand hygiene promoted   personal protective equipment utilized   rest/sleep promoted   single patient room provided   visitors restricted/screened  Taken 7/17/2024 0800 by Cami Beyer RN  Infection Prevention:   environmental surveillance performed   equipment surfaces disinfected   hand hygiene promoted   personal protective equipment utilized   rest/sleep promoted   single patient room provided   visitors restricted/screened  Goal: Optimal Comfort and Wellbeing  Outcome: Progressing  Intervention: Provide Person-Centered Care  Recent Flowsheet Documentation  Taken 7/17/2024 1600 by Cami Beyer RN  Trust Relationship/Rapport:   care explained   choices provided   emotional support provided   empathic listening provided   questions answered   questions encouraged   reassurance provided   thoughts/feelings acknowledged  Taken 7/17/2024 1200 by Cami Beyer RN  Trust Relationship/Rapport:   care explained   choices provided   emotional support provided   empathic listening provided   questions answered   questions encouraged   reassurance provided   thoughts/feelings acknowledged  Taken 7/17/2024 0800 by Cami Beyer RN  Trust Relationship/Rapport:   care explained   choices provided   emotional support provided   empathic listening provided   questions answered   questions encouraged   reassurance provided   thoughts/feelings acknowledged  Goal: Readiness for Transition of Care  Outcome:  Progressing  Intervention: Mutually Develop Transition Plan  Recent Flowsheet Documentation  Taken 7/17/2024 0900 by Cami Beyer RN  Equipment Currently Used at Home: none

## 2024-07-17 NOTE — CONSULTS
Care Management Follow Up    Length of Stay (days): 1    Expected Discharge Date: 07/18/2024     Concerns to be Addressed:       Patient plan of care discussed at interdisciplinary rounds: Yes    Anticipated Discharge Disposition: Home     Anticipated Discharge Services:    Anticipated Discharge DME:      Patient/family educated on Medicare website which has current facility and service quality ratings:    Education Provided on the Discharge Plan:    Patient/Family in Agreement with the Plan:      Referrals Placed by CM/SW:    Private pay costs discussed: Not applicable    Additional Information:  Chart reviewed. Therapy note reviewed, recs home with assist. CM following and can assist with discharge planning if needed     Sharita Lyles RN

## 2024-07-17 NOTE — MEDICATION SCRIBE - ADMISSION MEDICATION HISTORY
Medication Scribe Admission Medication History    Admission medication history is complete. The information provided in this note is only as accurate as the sources available at the time of the update.    Information Source(s): Patient via in-person    Pertinent Information: patient reports taking only a Vitamin D3 weekly.     Changes made to PTA medication list:  Added: None  Deleted: None  Changed: None    Allergies reviewed with patient and updates made in EHR: yes    Medication History Completed By: Matt Day 7/16/2024 7:30 PM    PTA Med List   Medication Sig Last Dose    cholecalciferol, vitamin D3, (VITAMIN D3) 2,000 unit capsule Take 1,000 Units by mouth once a week Past Week at unknown

## 2024-07-17 NOTE — PROGRESS NOTES
[facility]    Medicine Progress Note - Hospitalist Service    Date of Admission:  7/16/2024    Assessment & Plan   Marifer Temple is a 74 year old female with PMH significant for hypertension who is admitted on 7/16/2024 s/p acute CVA.    #Acute ischemic CVA  Presented with sudden onset slurred speech, LUE weakness, left facial droop. Admitted to ICU s/p tPA given in the ED. CT head concerning for possible acute ischemia. MRI brain showed small, late acute vs subacute infarct of the right precentral gyrus and subcortical white matter; small old lacunar infarct of the posterior limb of the right internal capsule/dorsal lentiform nucleus; mild to mdoerate chronic small vessel changes. Symptoms significantly improved.  - Neurology consulted  - Hold antithrombotic and anticoagulant medications for 24 hours s/p tPA  - Repeat CT head in 24 hours (1800 tonight), if normal  start ASA 81mg daily  - BP goal <180/105 for now, hydral and labetalol PRN  - TTE normal  - Tele  - PT/OT/SLP  - A1C 5.1, LDL 67; both at goal, no need for statin  - Transfer to floor    #Hypernatremia  Na to 147.  - PO intake    #CKD stage 3a  Cr 0.99, at baseline.    #Primary hypertension  Patient discontinued HCTZ and clonidine > 5 years ago. Long term goal <130/80  - Monitor BP with above goal    #Non-MI troponin elevation  Trop 65 to 64. Probably due to severe HTN on admission. No CP or concern for ACS. No WMA on TTE.  - BP control as above    #Thyroid nodules  Thyroid US here showed benign cystic nodules in both lobes, radiology recommended no further follow up. TSH 2.48.    #Leukocytosis  Afebrile. No respiratory urinary complaints. Probably reactive to CVA and HTN.          Diet: Combination Diet Regular Diet    DVT Prophylaxis: Pneumatic Compression Devices  Vincent Catheter: Not present  Lines: None     Cardiac Monitoring: ACTIVE order. Indication: ICU  Code Status: Full Code      Clinically Significant Risk Factors  Present on Admission         # Hypernatremia: Highest Na = 147 mmol/L in last 2 days, will monitor as appropriate          # Hypertension: Noted on problem list                        Disposition Plan     Medically Ready for Discharge: Anticipated Tomorrow             CELENA MUÑOZ MD  Hospitalist Service  [facility]  Securely message with "[employer]" (more info)  Text page via [employer] Paging/Directory   ______________________________________________________________________    Interval History   Doing well today. Slurred speech and left hand weakness are much improved. No headache.    Physical Exam   Vital Signs: Temp: 98.2  F (36.8  C) Temp src: Oral BP: (!) 158/84 Pulse: 62   Resp: 24 SpO2: 99 % O2 Device: None (Room air)    Weight: 128 lbs 14.4 oz    General: No overt distress, conversational, non-toxic appearing  HEENT: MMM  Pulmonary: CTAB; no crackles, wheezing, or rhonchi, normal effort  Cardiac: RRR. No m/r/g  Abdomen: Soft. NT, ND.  Extremities: No bilateral LE edema. Left hand with decreased  strength compared to right. Not facial droop, speech fluent.  Neuro: alert and awake, Ox4      Medical Decision Making       60 MINUTES SPENT BY ME on the date of service doing chart review, history, exam, documentation & further activities per the note.      Data     I have personally reviewed the following data over the past 24 hrs:    10.2  \   13.1   / 201     147 (H) 108 (H) 18.5 /  94   4.1 27 0.99 (H) \     Trop: 64 (H) BNP: N/A     TSH: 2.48 T4: N/A A1C: 5.1     INR:  1.06 PTT:  28   D-dimer:  N/A Fibrinogen:  N/A       Imaging results reviewed over the past 24 hrs:   Recent Results (from the past 24 hour(s))   CTA Head Neck with Contrast    Narrative    EXAM: CTA HEAD NECK W CONTRAST  LOCATION: [facility]  DATE: 7/16/2024    INDICATION: Code Stroke to evaluate for potential thrombolysis and thrombectomy. PLEASE READ IMMEDIATELY. Left arm weakness, facial  droop  COMPARISON: None.  CONTRAST: isovue 370 = 67ml  TECHNIQUE: Head and neck CT angiogram with IV contrast. Noncontrast head CT followed by axial helical CT images of the head and neck vessels obtained during the arterial phase of intravenous contrast administration. Axial 2D reconstructed images and   multiplanar 3D MIP reconstructed images of the head and neck vessels were performed by the technologist. Dose reduction techniques were used. All stenosis measurements made according to NASCET criteria unless otherwise specified.    FINDINGS:   NONCONTRAST HEAD CT:   INTRACRANIAL CONTENTS: No intracranial hemorrhage, extraaxial collection, or mass effect.  Mild asymmetric hypodensity involving anterior aspect of left basal ganglia with involvement of anterior limb of left internal capsule. This could be due to a   chronic ischemic change however subacute ischemia could have similar appearance. Mild presumed chronic small vessel ischemic changes. Mild generalized volume loss. No hydrocephalus.     VISUALIZED ORBITS/SINUSES/MASTOIDS: No intraorbital abnormality. No paranasal sinus mucosal disease. No middle ear or mastoid effusion.    BONES/SOFT TISSUES: No acute abnormality.    HEAD CTA:  ANTERIOR CIRCULATION: No stenosis/occlusion, aneurysm, or high flow vascular malformation. Standard [place] anatomy.    POSTERIOR CIRCULATION: No stenosis/occlusion, aneurysm, or high flow vascular malformation. Balanced vertebral arteries supply a normal basilar artery.     DURAL VENOUS SINUSES: Expected enhancement of the major dural venous sinuses.    NECK CTA:  RIGHT CAROTID: No measurable stenosis or dissection.    LEFT CAROTID: No measurable stenosis or dissection.    VERTEBRAL ARTERIES: No focal stenosis or dissection. Balanced vertebral arteries.    AORTIC ARCH: Classic aortic arch anatomy with no significant stenosis at the origin of the great vessels.    NONVASCULAR STRUCTURES: 14 mm nodule left thyroid gland.  Ultrasound recommended, if not done previously.      Impression    IMPRESSION:   HEAD CT:  1.  Mild asymmetric hypodensity involving anterior aspect of left basal ganglia with involvement of anterior limb of left internal capsule. This could be due to a chronic ischemic change however subacute ischemia could have similar appearance.  2.  No hemorrhage, mass, or mass effect.  3.  Moderate presumed chronic small vessel ischemia.  4.  Mild atrophy.    HEAD CTA:   1.  Normal CTA [place].    NECK CTA:  1.  Normal neck CTA.  2.  14 mm nodule left thyroid gland; ultrasound recommended, if not done previously.    Results were called to Dr. Mcknight at 7/16/2024 5:34 PM CDT.   MR Brain w/o Contrast    Narrative    EXAM: MR BRAIN W/O CONTRAST  LOCATION: [facility]  DATE: 7/17/2024    INDICATION: Acute ischemic stroke  COMPARISON: 7/16/2024  TECHNIQUE: Routine multiplanar multisequence head MRI without intravenous contrast.    FINDINGS:  INTRACRANIAL CONTENTS: Small focus of cortical and subcortical restricted diffusion and T2 FLAIR hyperintensity in the right precentral gyrus just lateral to the putative hand knob. No additional restricted diffusion to suggest additional recent   infarction. Small old lacunar type infarction in the dorsal right lentiform nucleus/posterior limb of the right internal capsule. No mass, acute hemorrhage, or extra-axial fluid collections. Patchy nonspecific T2/FLAIR hyperintensities within the   cerebral white matter and sirisha most consistent with mild to moderate chronic microvascular ischemic change. Normal ventricles and sulci. Normal position of the cerebellar tonsils.     SELLA: No abnormality accounting for technique.    OSSEOUS STRUCTURES/SOFT TISSUES: Normal marrow signal. The major intracranial vascular flow voids are maintained.     ORBITS: No abnormality accounting for technique.     SINUSES/MASTOIDS: No paranasal sinus mucosal disease. No middle ear or  mastoid effusion.       Impression    IMPRESSION:  1.  Small late acute to early subacute infarction involving the right precentral gyrus and subcortical white matter.  2.  Small old lacunar type infarction in the dorsal lentiform nucleus/posterior limb of the right internal capsule.  3.  Mild to moderate presumed chronic small vessel ischemic changes.  4.  No additional acute intracranial abnormality.   US Thyroid    Narrative    EXAM: US THYROID  LOCATION: [facility]  DATE: 2024    INDICATION: Thyroid nodule on recent neck CT.  COMPARISON: CTA head and neck 2024  TECHNIQUE: Thyroid ultrasound.     FINDINGS:  RIGHT lobe: 3.5 x 0.9 x 0.9 cm. There are a few tiny scattered cystic partially cystic nodules.  Isthmus: 2 mm.  LEFT lobe: 3.8 x 1.9 x 1.7 cm. There a nodule inferiorly described below.    NECK: No cervical lymphadenopathy.    NODULES:    Nodule 1: 2 x 1.3 x 1 cm   Composition: Cystic or almost completely cystic, 0 points   Echogenicity: Anechoic, 0 points   Shape: Wider-than-tall, 0 points   Margin: Smooth, 0 points   Echogenic Foci: None, or large comet-tail artifacts, 0 points   Point Total: 0 points. TI-RADS 1. No FNA.          Impression    IMPRESSION:  1.  Benign cystic nodules in both lobes, largest on the left.   2.  No follow-up needed.      Nodules are characterized per  ACR Thyroid Imaging, Reporting and Data System (TI-RADS): White Paper of the ACR TI-RADS Committee  Blake Teixeira et al. Journal of the American College of Radiology 2017. Volume 14 (2017), Issue 5, 584-202.      Echocardiogram Complete - For age > 60 yrs    Narrative    [id number]  [id number]  [id number]  [id number]^[place] [address]     Name: GLENIS BENITO  MRN: [id number]  : 1950  Study Date: 2024 11:06 AM  Age: 74 yrs  Gender: Female  Patient [facility]: [facility]  Reason For Study: Cerebrovascular Incident  Ordering  Physician: RUBIA TIRADO  Performed By: NESTOR     BSA: 1.6 m2  Height: 65 in  Weight: 128 lb  HR: 62  BP: 164/90 mmHg  ______________________________________________________________________________  Procedure  Complete Portable Echo Adult.  ______________________________________________________________________________  Interpretation Summary     1. Normal left ventricular size and systolic performance with a visually  estimated ejection fraction of 60-65%.  2. There is trace aortic insufficiency.  3. Normal right ventricular size and systolic performance.  ______________________________________________________________________________  Left ventricle:  Normal left ventricular size and systolic performance with a visually  estimated ejection fraction of 60-65%. There is normal regional wall motion.  Left ventricular wall thickness is normal.     Assessment of LV Diastolic Function: The cumulative findings suggest normal  diastolic filling [The septal e' velocity is < 7 cm/s & lateral e' velocity  is  < 10 cm/s. The average E/e' is < 14. The TR velocity cannot be determined due  to insufficient tricuspid insufficiency signal. Left atrial volume index is  less than 34 mL/mÂ ].     Right ventricle:  Normal right ventricular size and systolic performance.     Left atrium:  The left atrium is of normal size.     Right atrium:  The right atrium is of normal size.     IVC:  The IVC is of normal caliber.     Aortic valve:  The aortic valve is comprised of three cusps. There is no significant aortic  stenosis. There is trace aortic insufficiency.     Mitral valve:  The mitral valve appears morphologically normal. There is trace mitral  insufficiency.     Tricuspid valve:  The tricuspid valve is grossly morphologically normal. There is trace  tricuspid insufficiency.     Pulmonic valve:  The pulmonic valve is grossly morphologically normal. There is trace pulmonic  insufficiency.     Thoracic aorta:  The aortic root and proximal  ascending aorta are of normal dimension.     Pericardium:  There is no significant pericardial effusion.  ______________________________________________________________________________  ______________________________________________________________________________  MMode/2D Measurements & Calculations  IVSd: 1.1 cm  LVIDd: 4.1 cm  LVIDs: 2.8 cm  LVPWd: 1.1 cm  FS: 32.8 %  LV mass(C)d: 146.8 grams  LV mass(C)dI: 89.8 grams/m2  Ao root diam: 3.1 cm  asc Aorta Diam: 3.5 cm  LVOT diam: 2.1 cm  LVOT area: 3.5 cm2  Ao root diam index Ht(cm/m): 1.9  Ao root diam index BSA (cm/m2): 1.9  Asc Ao diam index BSA (cm/m2): 2.1  Asc Ao diam index Ht(cm/m): 2.1  EF Biplane: 64.8 %  LA Volume (BP): 51.4 ml     LA Volume Index (BP): 31.5 ml/m2  LA Volume Indexed (AL/bp): 33.1 ml/m2  RWT: 0.53  TAPSE: 1.8 cm     Time Measurements  MM HR: 66.0 BPM     Doppler Measurements & Calculations  MV E max jean-pierre: 53.7 cm/sec  MV A max jean-pierre: 68.9 cm/sec  MV E/A: 0.78  MV dec slope: 216.0 cm/sec2  MV dec time: 0.25 sec  Ao V2 max: 105.0 cm/sec  Ao max P.0 mmHg  Ao V2 mean: 65.8 cm/sec  Ao mean P.0 mmHg  Ao V2 VTI: 18.9 cm  CRISTOFER(I,D): 3.6 cm2  CRISTOFER(V,D): 3.2 cm2  LV V1 max PG: 3.8 mmHg  LV V1 max: 96.9 cm/sec  LV V1 VTI: 19.8 cm  SV(LVOT): 68.6 ml  SI(LVOT): 41.9 ml/m2  PA acc time: 0.12 sec  Pulm Sys Jean-Pierre: 53.0 cm/sec  Pulm Tony Jean-Pierre: 29.6 cm/sec  Pulm A Revs Jean-Pierre: 48.0 cm/sec  Pulm S/D: 1.8  AV Jean-Pierre Ratio (DI): 0.92  CRISTOFER Index (cm2/m2): 2.2  E/E': 14.9  E/E' av.0     Lateral E/e': 7.0  Medial E/e': 15.0  Peak E' Jean-Pierre: 3.6 cm/sec  RV S Jean-Pierre: 10.7 cm/sec     ______________________________________________________________________________  Report approved by: Radha Landry 2024 12:10 PM

## 2024-07-17 NOTE — PLAN OF CARE
"[facility] - ICU    RN Progress Note:            Pertinent Assessments:      Please refer to flowsheet rows for full assessment     See Chart           Key Events - This Shift:       Patient is Improving   The NIHSS score 1   -   0  Blood pressure is stable, niCARdipine is OFF  Patient denies pain  MRI done at 6 am                   Barriers to Discharge / Downgrade:     For neuro assessment         Point of Contact Update YES-OR-NO: Yes  If No, reason:   Name:  (spouse)  Phone Number:in Chart  Summary of Conversation: care explained        Problem: Adult Inpatient Plan of Care  Goal: Plan of Care Review  Description: The Plan of Care Review/Shift note should be completed every shift.  The Outcome Evaluation is a brief statement about your assessment that the patient is improving, declining, or no change.  This information will be displayed automatically on your shift  note.  Outcome: Progressing  Flowsheets (Taken 7/17/2024 0711)  Plan of Care Reviewed With:   patient   spouse  Goal: Patient-Specific Goal (Individualized)  Description: You can add care plan individualizations to a care plan. Examples of Individualization might be:  \"Parent requests to be called daily at 9am for status\", \"I have a hard time hearing out of my right ear\", or \"Do not touch me to wake me up as it startles  me\".  Outcome: Progressing  Flowsheets (Taken 7/16/2024 2100)  Anxieties, Fears or Concerns: None  Goal: Absence of Hospital-Acquired Illness or Injury  Outcome: Progressing  Intervention: Identify and Manage Fall Risk  Recent Flowsheet Documentation  Taken 7/17/2024 0400 by Bony Prieto, RN  Safety Promotion/Fall Prevention:   activity supervised   clutter free environment maintained   patient and family education   room door open   room near nurse's station  Taken 7/17/2024 0000 by Bony Prieto, RN  Safety Promotion/Fall Prevention:   activity supervised   clutter free environment maintained   " patient and family education   room door open   room near nurse's station  Taken 7/16/2024 2100 by Bony Prieto RN  Safety Promotion/Fall Prevention:   activity supervised   clutter free environment maintained   patient and family education   room door open   room near nurse's station  Intervention: Prevent Skin Injury  Recent Flowsheet Documentation  Taken 7/17/2024 0400 by Bony Prieto RN  Body Position: position changed independently  Taken 7/17/2024 0000 by Bony Prieto RN  Body Position: position changed independently  Intervention: Prevent Infection  Recent Flowsheet Documentation  Taken 7/16/2024 2100 by Bony Prieto RN  Infection Prevention: cohorting utilized  Goal: Optimal Comfort and Wellbeing  Outcome: Progressing  Intervention: Provide Person-Centered Care  Recent Flowsheet Documentation  Taken 7/16/2024 2100 by Bony Prieto RN  Trust Relationship/Rapport:   care explained   emotional support provided   questions answered   reassurance provided  Goal: Readiness for Transition of Care  Outcome: Progressing   Goal Outcome Evaluation:      Plan of Care Reviewed With: patient, spouse

## 2024-07-17 NOTE — PROGRESS NOTES
07/17/24 1415   Appointment Info   Signing Clinician's Name / Credentials (PT) Addie Ivy, XIANG   Living Environment   People in Home spouse   Current Living Arrangements house  (2 level home with bedroom up stairs.)   Home Accessibility stairs to enter home;stairs within home   Number of Stairs, Main Entrance 3   Stair Railings, Main Entrance railing on right side (ascending)   Number of Stairs, Within Home, Primary greater than 10 stairs   Stair Railings, Within Home, Primary railing on left side (ascending)   Transportation Anticipated family or friend will provide   Living Environment Comments Pt said her sister can assist if needed   Self-Care   Usual Activity Tolerance good   Current Activity Tolerance moderate   Regular Exercise No   Equipment Currently Used at Home none  (Pt's  does have a cane and they can get a walker if needed.)   Fall history within last six months no   Activity/Exercise/Self-Care Comment Indep with all mobility and ADLs.  Pt does drive.   General Information   Onset of Illness/Injury or Date of Surgery 07/16/24   Referring Physician Dr Lopez   Patient/Family Therapy Goals Statement (PT) Pt wants to go home.   Pertinent History of Current Problem (include personal factors and/or comorbidities that impact the POC) Per the chart, Marifer Temple is a 74 year old female with PMH significant for hypertension who is admitted on 7/16/2024 s/p acute CVA.   Existing Precautions/Restrictions fall  (Told nursing, pt is in the chair with call light by the pt.)   Weight-Bearing Status - LLE weight-bearing as tolerated   Weight-Bearing Status - RLE weight-bearing as tolerated   Cognition   Affect/Mental Status (Cognition) WNL   Orientation Status (Cognition) oriented x 4   Pain Assessment   Patient Currently in Pain No   Posture    Posture Comments good posture.   Range of Motion (ROM)   Range of Motion ROM is WNL   ROM Comment bilat LEs   Strength (Manual Muscle Testing)   Strength  (Manual Muscle Testing) strength is WNL   Strength Comments 5/5 bilat LEs   Bed Mobility   Comment, (Bed Mobility) supine>sit indep with no rail  Sit scoot indep.   Transfers   Comment, (Transfers) Sit<>stand with and without AD with supervision.  Cues for hand placement.   Gait/Stairs (Locomotion)   [place] Level (Gait) supervision;nonverbal cues (demo/gesture);1 person assist   Assistive Device (Gait) walker, front-wheeled  (FWW)   Distance in Feet (Gait) 30'   Pattern (Gait) step-through;swing-through   Deviations/Abnormal Patterns (Gait) gait speed decreased   Balance   Balance Comments Supervision with FWW and and SBA without AD.  No LOB.   Sensory Examination   Sensory Perception WNL   Sensory Perception Comments Bilat LEs   Coordination   Coordination Comments good bilat LEs.   Clinical Impression   Criteria for Skilled Therapeutic Intervention Yes, treatment indicated   PT Diagnosis (PT) Impaired functional mobility.   Influenced by the following impairments dec bal, dec endurance. .   Functional limitations due to impairments transfers, gait and steps.   Clinical Presentation (PT Evaluation Complexity) stable   Clinical Presentation Rationale P presents medically diagnosed.   Clinical Decision Making (Complexity) low complexity   Planned Therapy Interventions (PT) balance training;gait training;home exercise program;neuromuscular re-education;transfer training;strengthening;stair training   Risk & Benefits of therapy have been explained evaluation/treatment results reviewed;care plan/treatment goals reviewed;risks/benefits reviewed;patient;spouse/significant other;participants voiced agreement with care plan   PT Total Evaluation Time   PT Eval, Low Complexity Minutes (65151) 13   Physical Therapy Goals   PT Frequency Daily   PT Predicted Duration/Target Date for Goal Attainment 07/19/24   PT Goals Transfers;Gait;Stairs   PT: Transfers Independent;Sit to/from stand;Bed to/from chair   PT: Gait  Independent;Greater than 200 feet   PT: Stairs Supervision/stand-by assist;10 stairs;Rail on left   Interventions   Interventions Quick Adds Gait Training;Therapeutic Activity;Therapeutic Procedure   Therapeutic Procedure/Exercise   Ther. Procedure: strength, endurance, ROM, flexibillity Minutes (31300) 13   Treatment Detail/Skilled Intervention bilat LE ex x 10 to 15 reps with cues for technique.  Bilat coordination ex.  x 10 to 15 reps   Gait Training   Gait Training Minutes (89268) 10   Symptoms Noted During/After Treatment (Gait Training) fatigue   Treatment Detail/Skilled Intervention Pt walked without AD with SBA and she did not have any LOB..  Slight path deviations but this is baseline for her.   Distance in Feet 120   [place] Level (Gait Training)   (SBA)   Physical Assistance Level (Gait Training) verbal cues;1 person assist   Weight Bearing (Gait Training) weight-bearing as tolerated   Assistive Device (Gait Training) other (see comments)  (none)   Pattern Analysis (Gait Training) swing-through gait   Gait Analysis Deviations decreased sukhjinder   Stair Railings present on left side   Physical Assist/Nonphysical Assist (Stairs)   (up and down 45 steps with the left rail with SBA.  No LOB.)   Level of [place] (Stairs) stand-by assist  (step over step up and down.  Cues for step to if she has any lE pain.)   PT Discharge Planning   PT Plan gait w no AD, bal, ex HEP   PT Discharge Recommendation (DC Rec) home with assist   PT Rationale for DC Rec The pt walked with SBA and had no LOB.  She should be able to dc to home with family to assist.  Pt seems close to her baseline mobility.   PT Brief overview of current status PT eval. indep with supine>sit, tansfers with and without AD with supervision, ambulated with FWW with supervision and SBA without AD.  No LOB.  Up and down 5 steps with rail with SBA.  LE ex and recommended the pt do LE ex daily.   PT Equipment Needed at Discharge other (see  comments)  (none)   Total Session Time   Timed Code Treatment Minutes 23   Total Session Time (sum of timed and untimed services) 36

## 2024-07-17 NOTE — ED NOTES
ICU bedside neuro check completed with MARQUITA Lovett ; patient has now begun Q30 neuro checks. MARQUITA Lovett assuming patient care at this time.

## 2024-07-17 NOTE — H&P
"[facility]    History and Physical - Hospitalist Service       Date of Admission:  7/16/2024    Assessment & Plan      Marifer Temple is a 74 year old female with PMH significant for hypertension who is admitted on 7/16/2024 s/p acute CVA.    # Acute CVA- Admitted to ICU s/p tPA given in the ED.  CT head shows \"mild asymmetric hypodensity involving anterior aspect of left basal ganglia with involvement of anterior limb of left internal capsule.... could be due to chronic ischemic change\" vs subacute ischemia.  Neurology recommendations reviewed.  Hold antithrombotic and anticoagulant medications for 24 hours s/p tPA.  Repeat CT head in 24 hours.  Check MRI brain.  Check 2D echocardiogram.  PT/OT/ST.  Currently on Cardene drip with BP parameters (goal /105 mmHg).  Follow-up lipid panel.  Continue telemetry monitoring.    # Hypertension- Patient discontinued HCTZ and clonidine > 5 years ago.  Currently on nicardipine drip.  Monitor vital signs closely.    # Elevated troponin- Likely 2/2 demand ischemia.  On nicardipine drip for uncontrolled BP.  Troponin = 65-->64.  Repeat troponin in 6 hours. EKG reviewed and shows NSR with no ST elevation or depressions.  Possible inferior infarct of indeterminate age.  Continue telemetry monitoring.  Follow-up 2D echocardiogram.  Will make further recommendations based on results.    # Thyroid nodule- Follow up TSH.  Check thyroid ultrasound.    # Leukocytosis- Afebrile.  No respiratory urinary complaints.  Likely stress-induced demargination.  Repeat CBC in AM.        Diet: NPO for Medical/Clinical Reasons Except for: No Exceptions  Combination Diet 2 gm NA Diet  if passes swallow screen  DVT Prophylaxis: Pneumatic Compression Devices  Vincent Catheter: Not present  Lines: None     Cardiac Monitoring: ACTIVE order. Indication: ICU  Code Status: Full Code per patient.    Clinically Significant Risk Factors Present on Admission                  # " Hypertension: Noted on problem list                        Disposition Plan   Anticipate greater than 2 midnight Inpatient Hospital stay.           Mi Guzmán MD  Hospitalist Service  [facility]  Securely message with [employer] (more info)  Text page via [employer] Paging/Directory     ______________________________________________________________________    Chief Complaint   Left arm weakness/ numbness, slurred speech, right facial droop    History is obtained from the patient and chart review.    History of Present Illness   Marifer Temple is a 74 year old female with PMH significant for hypertension who presented to ED due to left arm numbness, weakness, slurred speech and right facial droop.  Symptoms began while she was driving home and lasted approximately an hour.  CT head showed mild asymmetric hypodensity involving anterior aspect of left basal ganglia with involvement of anterior limb of left internal capsule.  Neurology was consulted and patient received tPA in the ED.  Patient being seen and examined in ICU setting s/p receiving thrombolytic agent.  Currently on Cardene drip for BP control.  Patient says that she feels back to her baseline.   is at bedside and says that her speech is back to normal.  Patient denies any visual changes, lightheadedness, dizziness, nausea, vomiting, chest pain, shortness of breath, fever, chills.  Patient currently appears in no acute distress.      Past Medical History    HTN    Past Surgical History   Past Surgical History:   Procedure Laterality Date    OTHER SURGICAL HISTORY      bowel obstruction pediatric    OTHER SURGICAL HISTORY      s/p partial colectomy as a child    OTHER SURGICAL HISTORY      right carpal  tunnel release       Prior to Admission Medications   Prior to Admission Medications   Prescriptions Last Dose Informant Patient Reported? Taking?   cholecalciferol, vitamin D3, (VITAMIN D3) 2,000 unit capsule Past Week at unknown   Yes Yes   Sig: Take 1,000 Units by mouth once a week      Facility-Administered Medications: None        Review of Systems    The 10 point Review of Systems is negative other than noted in the HPI.    Social History   I have reviewed this patient's social history and updated it with pertinent information if needed.  Social History     Tobacco Use    Smoking status: Never    Smokeless tobacco: Never   Occasional alcohol.  Denies illicit drug use.      Family History   I have reviewed this patient's family history and updated it with pertinent information if needed.  Family History   Problem Relation Age of Onset    Leukemia Mother     Hypertension Mother     Colon Cancer Father     Atrial fibrillation Sister     Rheumatoid Arthritis Sister     Breast Cancer Maternal Aunt       CVA- Maternal Grandmother  TIA- Mother    Physical Exam   Vital Signs: Temp: 98.7  F (37.1  C) Temp src: Oral BP: (!) 144/78 Pulse: 75   Resp: 13 SpO2: 95 % O2 Device: None (Room air)    Weight: 128 lbs 14.4 oz    General Appearance: AAOx3, NAD  Respiratory: CTAB  Cardiovascular: Regular rate, S1S2  GI: +BS, soft, NT, ND  Skin: No rash  Neuro: CN II- XII intact. Strength 5/5 in upper extremities bilaterally (however LUE slighly weaker than RUE). Strength 5/5 bilateral lower extremities. Sensation intact.   Other: No pedal edema     Medical Decision Making       60 MINUTES SPENT BY ME on the date of service doing chart review, history, exam, documentation & further activities per the note.      Data     I have personally reviewed the following data over the past 24 hrs:    11.4 (H)  \   13.4   / 200     141 105 25.3 (H) /  98   3.9 25 1.08 (H) \     Trop: 64 (H) BNP: N/A     INR:  1.03 PTT:  30   D-dimer:  N/A Fibrinogen:  N/A       Imaging results reviewed over the past 24 hrs:   Recent Results (from the past 24 hour(s))   CTA Head Neck with Contrast    Narrative    EXAM: CTA HEAD NECK W CONTRAST  LOCATION: [facility]  HOSPITAL  DATE: 7/16/2024    INDICATION: Code Stroke to evaluate for potential thrombolysis and thrombectomy. PLEASE READ IMMEDIATELY. Left arm weakness, facial droop  COMPARISON: None.  CONTRAST: isovue 370 = 67ml  TECHNIQUE: Head and neck CT angiogram with IV contrast. Noncontrast head CT followed by axial helical CT images of the head and neck vessels obtained during the arterial phase of intravenous contrast administration. Axial 2D reconstructed images and   multiplanar 3D MIP reconstructed images of the head and neck vessels were performed by the technologist. Dose reduction techniques were used. All stenosis measurements made according to NASCET criteria unless otherwise specified.    FINDINGS:   NONCONTRAST HEAD CT:   INTRACRANIAL CONTENTS: No intracranial hemorrhage, extraaxial collection, or mass effect.  Mild asymmetric hypodensity involving anterior aspect of left basal ganglia with involvement of anterior limb of left internal capsule. This could be due to a   chronic ischemic change however subacute ischemia could have similar appearance. Mild presumed chronic small vessel ischemic changes. Mild generalized volume loss. No hydrocephalus.     VISUALIZED ORBITS/SINUSES/MASTOIDS: No intraorbital abnormality. No paranasal sinus mucosal disease. No middle ear or mastoid effusion.    BONES/SOFT TISSUES: No acute abnormality.    HEAD CTA:  ANTERIOR CIRCULATION: No stenosis/occlusion, aneurysm, or high flow vascular malformation. Standard [place] anatomy.    POSTERIOR CIRCULATION: No stenosis/occlusion, aneurysm, or high flow vascular malformation. Balanced vertebral arteries supply a normal basilar artery.     DURAL VENOUS SINUSES: Expected enhancement of the major dural venous sinuses.    NECK CTA:  RIGHT CAROTID: No measurable stenosis or dissection.    LEFT CAROTID: No measurable stenosis or dissection.    VERTEBRAL ARTERIES: No focal stenosis or dissection. Balanced vertebral arteries.    AORTIC  ARCH: Classic aortic arch anatomy with no significant stenosis at the origin of the great vessels.    NONVASCULAR STRUCTURES: 14 mm nodule left thyroid gland. Ultrasound recommended, if not done previously.      Impression    IMPRESSION:   HEAD CT:  1.  Mild asymmetric hypodensity involving anterior aspect of left basal ganglia with involvement of anterior limb of left internal capsule. This could be due to a chronic ischemic change however subacute ischemia could have similar appearance.  2.  No hemorrhage, mass, or mass effect.  3.  Moderate presumed chronic small vessel ischemia.  4.  Mild atrophy.    HEAD CTA:   1.  Normal CTA [place].    NECK CTA:  1.  Normal neck CTA.  2.  14 mm nodule left thyroid gland; ultrasound recommended, if not done previously.    Results were called to Dr. Mcknight at 7/16/2024 5:34 PM CDT.

## 2024-07-18 ENCOUNTER — APPOINTMENT (OUTPATIENT)
Dept: PHYSICAL THERAPY | Facility: HOSPITAL | Age: 74
DRG: 062 | End: 2024-07-18
Payer: COMMERCIAL

## 2024-07-18 ENCOUNTER — APPOINTMENT (OUTPATIENT)
Dept: CARDIOLOGY | Facility: HOSPITAL | Age: 74
DRG: 062 | End: 2024-07-18
Attending: STUDENT IN AN ORGANIZED HEALTH CARE EDUCATION/TRAINING PROGRAM
Payer: COMMERCIAL

## 2024-07-18 VITALS
HEIGHT: 63 IN | HEART RATE: 65 BPM | RESPIRATION RATE: 18 BRPM | BODY MASS INDEX: 21.56 KG/M2 | DIASTOLIC BLOOD PRESSURE: 71 MMHG | TEMPERATURE: 97.9 F | WEIGHT: 121.69 LBS | SYSTOLIC BLOOD PRESSURE: 122 MMHG | OXYGEN SATURATION: 97 %

## 2024-07-18 LAB
ANION GAP SERPL CALCULATED.3IONS-SCNC: 15 MMOL/L (ref 7–15)
BUN SERPL-MCNC: 24.9 MG/DL (ref 8–23)
CALCIUM SERPL-MCNC: 9.2 MG/DL (ref 8.8–10.4)
CHLORIDE SERPL-SCNC: 106 MMOL/L (ref 98–107)
CREAT SERPL-MCNC: 1.09 MG/DL (ref 0.51–0.95)
EGFRCR SERPLBLD CKD-EPI 2021: 53 ML/MIN/1.73M2
GLUCOSE SERPL-MCNC: 101 MG/DL (ref 70–99)
HCO3 SERPL-SCNC: 25 MMOL/L (ref 22–29)
HOLD SPECIMEN: NORMAL
HOLD SPECIMEN: NORMAL
POTASSIUM SERPL-SCNC: 4 MMOL/L (ref 3.4–5.3)
SODIUM SERPL-SCNC: 146 MMOL/L (ref 135–145)

## 2024-07-18 PROCEDURE — 97112 NEUROMUSCULAR REEDUCATION: CPT | Mod: GP

## 2024-07-18 PROCEDURE — 97110 THERAPEUTIC EXERCISES: CPT | Mod: GP

## 2024-07-18 PROCEDURE — 250N000011 HC RX IP 250 OP 636: Performed by: FAMILY MEDICINE

## 2024-07-18 PROCEDURE — 97116 GAIT TRAINING THERAPY: CPT | Mod: GP

## 2024-07-18 PROCEDURE — 99239 HOSP IP/OBS DSCHRG MGMT >30: CPT | Performed by: STUDENT IN AN ORGANIZED HEALTH CARE EDUCATION/TRAINING PROGRAM

## 2024-07-18 PROCEDURE — 250N000013 HC RX MED GY IP 250 OP 250 PS 637: Performed by: FAMILY MEDICINE

## 2024-07-18 PROCEDURE — 93270 REMOTE 30 DAY ECG REV/REPORT: CPT

## 2024-07-18 PROCEDURE — 99232 SBSQ HOSP IP/OBS MODERATE 35: CPT | Performed by: PSYCHIATRY & NEUROLOGY

## 2024-07-18 PROCEDURE — 250N000013 HC RX MED GY IP 250 OP 250 PS 637: Performed by: STUDENT IN AN ORGANIZED HEALTH CARE EDUCATION/TRAINING PROGRAM

## 2024-07-18 PROCEDURE — 80048 BASIC METABOLIC PNL TOTAL CA: CPT | Performed by: STUDENT IN AN ORGANIZED HEALTH CARE EDUCATION/TRAINING PROGRAM

## 2024-07-18 PROCEDURE — 36415 COLL VENOUS BLD VENIPUNCTURE: CPT | Performed by: STUDENT IN AN ORGANIZED HEALTH CARE EDUCATION/TRAINING PROGRAM

## 2024-07-18 RX ORDER — HYDRALAZINE HYDROCHLORIDE 10 MG/1
10 TABLET, FILM COATED ORAL 3 TIMES DAILY PRN
Qty: 30 TABLET | Refills: 0 | Status: SHIPPED | OUTPATIENT
Start: 2024-07-18 | End: 2024-08-09

## 2024-07-18 RX ORDER — LOSARTAN POTASSIUM 25 MG/1
25 TABLET ORAL DAILY
Qty: 90 TABLET | Refills: 0 | Status: SHIPPED | OUTPATIENT
Start: 2024-07-18 | End: 2024-07-18

## 2024-07-18 RX ORDER — ASPIRIN 81 MG/1
81 TABLET ORAL DAILY
Status: DISCONTINUED | OUTPATIENT
Start: 2024-07-18 | End: 2024-07-18 | Stop reason: HOSPADM

## 2024-07-18 RX ORDER — LOSARTAN POTASSIUM 25 MG/1
25 TABLET ORAL DAILY
Status: DISCONTINUED | OUTPATIENT
Start: 2024-07-18 | End: 2024-07-18

## 2024-07-18 RX ORDER — ASPIRIN 81 MG/1
81 TABLET ORAL DAILY
Qty: 30 TABLET | Refills: 2 | Status: SHIPPED | OUTPATIENT
Start: 2024-07-18 | End: 2024-09-10

## 2024-07-18 RX ORDER — LOSARTAN POTASSIUM 25 MG/1
12.5 TABLET ORAL DAILY
Qty: 30 TABLET | Refills: 0 | Status: SHIPPED | OUTPATIENT
Start: 2024-07-18 | End: 2024-09-10

## 2024-07-18 RX ADMIN — Medication 5 MG: at 02:52

## 2024-07-18 RX ADMIN — HYDRALAZINE HYDROCHLORIDE 10 MG: 20 INJECTION INTRAMUSCULAR; INTRAVENOUS at 00:31

## 2024-07-18 RX ADMIN — ASPIRIN 81 MG: 81 TABLET, COATED ORAL at 10:34

## 2024-07-18 RX ADMIN — LABETALOL HYDROCHLORIDE 10 MG: 5 INJECTION, SOLUTION INTRAVENOUS at 02:47

## 2024-07-18 ASSESSMENT — ACTIVITIES OF DAILY LIVING (ADL)
ADLS_ACUITY_SCORE: 22
ADLS_ACUITY_SCORE: 26
ADLS_ACUITY_SCORE: 22
ADLS_ACUITY_SCORE: 26
ADLS_ACUITY_SCORE: 22
ADLS_ACUITY_SCORE: 22
ADLS_ACUITY_SCORE: 26
ADLS_ACUITY_SCORE: 22
ADLS_ACUITY_SCORE: 26
ADLS_ACUITY_SCORE: 22

## 2024-07-18 NOTE — PLAN OF CARE
Occupational Therapy Discharge Summary    Reason for therapy discharge:    Discharged to home with home therapy.    Progress towards therapy goal(s). See goals on Care Plan in [facility] electronic health record for goal details.  Goals met    Therapy recommendation(s):    No further therapy is recommended.

## 2024-07-18 NOTE — PROGRESS NOTES
Care Management Discharge Note    Discharge Date: 07/18/2024       Discharge Disposition: Home    Discharge Services: None    Discharge DME: None    Discharge Transportation: family or friend will provide    Private pay costs discussed: Not applicable    Does the patient's insurance plan have a 3 day qualifying hospital stay waiver?  No    PAS Confirmation Code: NA  Patient/family educated on Medicare website which has current facility and service quality ratings:      Education Provided on the Discharge Plan: Yes (per care team)  Persons Notified of Discharge Plans: per care team  Patient/Family in Agreement with the Plan: yes    Handoff Referral Completed: Yes    Additional Information:  Patient discharging home with spouse and OP follow up. No CM needs identified.     Florencia Manrique RN

## 2024-07-18 NOTE — DISCHARGE SUMMARY
[facility]  Hospitalist Discharge Summary      Date of Admission:  7/16/2024  Date of Discharge:  7/18/2024  Discharging Provider: CELENA MUÑOZ MD  Discharge Service: Hospitalist Service    Discharge Diagnoses     #Acute ischemic CVA  #Hypernatremia  #CKD stage 3a  #Primary HTN  #Non-MI troponin elevation  #Thyroid nodules  #Leukocytosis    Clinically Significant Risk Factors          Follow-ups Needed After Discharge   Follow-up Appointments     Follow-up and recommended labs and tests       Follow up with primary care provider, Siomara Ott, within 7-14 days   for hospital follow- up.  The following labs/tests are recommended: BMP.            Unresulted Labs Ordered in the Past 30 Days of this Admission       No orders found from 6/16/2024 to 7/17/2024.            Discharge Disposition   Discharged to home  Condition at discharge: Stable    Hospital Course   Marifer Temple is a 74 year old female with PMH significant for hypertension who is admitted on 7/16/2024 s/p acute CVA.    #Acute ischemic CVA  Presented with sudden onset slurred speech, LUE weakness, left facial droop. Admitted to ICU s/p tPA given in the ED. CT head concerning for possible acute ischemia. MRI brain showed small, late acute vs subacute infarct of the right precentral gyrus and subcortical white matter; small old lacunar infarct of the posterior limb of the right internal capsule/dorsal lentiform nucleus; mild to mdoerate chronic small vessel changes. Symptoms significantly improved. Follow up CT head at 24 hours was without hemorrhage.  - Neurology consulted  - Start ASA 81mg daily indefinitely  - BP goal <180/105 while here, this was improved by discharge, goal <130/80 long-term  - TTE normal  - Tele without Afib while here  - PT/OT/SLP evaluated and recommended no further therapy due to functional gains in the hospital  - A1C 5.1, LDL 67; both at goal, no need for statin  - 30-day cardiac monitor placed  prior to discharge    #Primary hypertension  Patient discontinued HCTZ and clonidine > 5 years ago. Long term goal <130/80. Hasn't needed meds lately. The above severe HTN is common post-CVA and often corrects itself, but given how high it was getting, will start low-dose treatment that she may not need going forward.  - Start losartan 12.5mg daily  - Given hydralazine 10mg PO TID PRN for SBP >190, hopefully will not need this much if at all  - Instructed patient to check BP twice a day at rest or if she feels symptoms she thinks could be elevated BP  - Record blood pressures and see PCP in 1-2 weeks to determine if losartan should be continued or changed    #Hypernatremia  Na to 147 to 146. Discussed with patient to hydrate a little extra today    #CKD stage 3a  Cr 0.99 on admission, at baseline.    #Non-MI troponin elevation  Trop 65 to 64. Probably due to severe HTN on admission. No CP or concern for ACS. No WMA on TTE.  - BP control as above    #Thyroid nodules  Thyroid US here showed benign cystic nodules in both lobes, radiology recommended no further follow up. TSH 2.48.    #Leukocytosis, resolved  Afebrile. No respiratory urinary complaints. Probably reactive to CVA and HTN.    Consultations This Hospital Stay   NEUROLOGY IP STROKE CONSULT  SPEECH LANGUAGE PATH ADULT IP CONSULT  PHARMACY IP CONSULT  PHARMACY IP CONSULT  PHARMACY IP CONSULT  PHYSICAL THERAPY ADULT IP CONSULT  OCCUPATIONAL THERAPY ADULT IP CONSULT  REHAB ADMISSIONS LIAISON IP CONSULT  CARE MANAGEMENT / SOCIAL WORK IP CONSULT  SMOKING CESSATION PROGRAM IP CONSULT    Code Status   Full Code    Time Spent on this Encounter   I, CELENA MUÑOZ MD, personally saw the patient today and spent greater than 30 minutes discharging this patient.       CELENA MUÑOZ MD  [address] [address]  Phone: [phone]  Fax:  [phone]  ______________________________________________________________________    Physical Exam   Vital Signs: Temp: 97.9  F (36.6  C) Temp src: Oral BP: 122/71 Pulse: 65   Resp: 18 SpO2: 97 % O2 Device: None (Room air)    Weight: 121 lbs 11.1 oz      General: No overt distress, conversational, non-toxic appearing  HEENT: MMM  Pulmonary: CTAB; no crackles, wheezing, or rhonchi, normal effort  Cardiac: RRR. No m/r/g  Abdomen: Soft. NT, ND.  Extremities: No bilateral LE edema. Left hand with mildly decreased  strength compared to right. Not facial droop, speech fluent.  Neuro: alert and awake, Ox4       Primary Care Physician   Siomara Ott    Discharge Orders      Adult Cardiology Eval [facility] Referral      Primary Care - Care Coordination Referral      Reason for your hospital stay    You were admitted for a stroke. You received a medication that quickly dissolves blood clots because of your quick presentation. Your symptoms improved and the therapy teams recommended no further follow up with them is needed at this time.    The neurologist recommends that you take an aspirin, 81mg, daily indefinitely. Your blood pressure was high here, which can be seen after a stroke. This often resolves on its own, but you should take a blood pressure medication daily for now and monitor your blood pressure at home. You can use the as needed hydralazine medication if your blood pressure gets particularly high over this next week or so. But please follow up with your primary care doctor in 1-2 weeks to check in on your blood pressure and determine if you still need the daily blood pressure medication or if this can be stopped.     Follow-up and recommended labs and tests     Follow up with primary care provider, Siomara Ott, within 7-14 days for hospital follow- up.  The following labs/tests are recommended: BMP.     Activity    Your activity upon discharge: activity as tolerated     Diet    Follow this diet upon  discharge: Orders Placed This Encounter      Combination Diet Regular Diet       Significant Results and Procedures   Most Recent 3 CBC's:  Recent Labs   Lab Test 07/17/24  0423 07/16/24  1734 05/07/21  1146   WBC 10.2 11.4* 8.1   HGB 13.1 13.4 14.2   MCV 89 89 89    200 260     Most Recent 3 BMP's:  Recent Labs   Lab Test 07/18/24  0716 07/17/24  1134 07/17/24  0829 07/17/24  0423 07/17/24  0046 07/16/24  1734   *  --   --  147*  --  141   POTASSIUM 4.0  --   --  4.1  --  3.9   CHLORIDE 106  --   --  108*  --  105   CO2 25  --   --  27  --  25   BUN 24.9*  --   --  18.5  --  25.3*   CR 1.09*  --   --  0.99*  --  1.08*   ANIONGAP 15  --   --  12  --  11   PHILLY 9.2  --   --  8.9  --  9.2   * 94 93 105*   < > 98    < > = values in this interval not displayed.   ,   Results for orders placed or performed during the hospital encounter of 07/16/24   CTA Head Neck with Contrast    Narrative    EXAM: CTA HEAD NECK W CONTRAST  LOCATION: [facility]  DATE: 7/16/2024    INDICATION: Code Stroke to evaluate for potential thrombolysis and thrombectomy. PLEASE READ IMMEDIATELY. Left arm weakness, facial droop  COMPARISON: None.  CONTRAST: isovue 370 = 67ml  TECHNIQUE: Head and neck CT angiogram with IV contrast. Noncontrast head CT followed by axial helical CT images of the head and neck vessels obtained during the arterial phase of intravenous contrast administration. Axial 2D reconstructed images and   multiplanar 3D MIP reconstructed images of the head and neck vessels were performed by the technologist. Dose reduction techniques were used. All stenosis measurements made according to NASCET criteria unless otherwise specified.    FINDINGS:   NONCONTRAST HEAD CT:   INTRACRANIAL CONTENTS: No intracranial hemorrhage, extraaxial collection, or mass effect.  Mild asymmetric hypodensity involving anterior aspect of left basal ganglia with involvement of anterior limb of left internal  capsule. This could be due to a   chronic ischemic change however subacute ischemia could have similar appearance. Mild presumed chronic small vessel ischemic changes. Mild generalized volume loss. No hydrocephalus.     VISUALIZED ORBITS/SINUSES/MASTOIDS: No intraorbital abnormality. No paranasal sinus mucosal disease. No middle ear or mastoid effusion.    BONES/SOFT TISSUES: No acute abnormality.    HEAD CTA:  ANTERIOR CIRCULATION: No stenosis/occlusion, aneurysm, or high flow vascular malformation. Standard [place] anatomy.    POSTERIOR CIRCULATION: No stenosis/occlusion, aneurysm, or high flow vascular malformation. Balanced vertebral arteries supply a normal basilar artery.     DURAL VENOUS SINUSES: Expected enhancement of the major dural venous sinuses.    NECK CTA:  RIGHT CAROTID: No measurable stenosis or dissection.    LEFT CAROTID: No measurable stenosis or dissection.    VERTEBRAL ARTERIES: No focal stenosis or dissection. Balanced vertebral arteries.    AORTIC ARCH: Classic aortic arch anatomy with no significant stenosis at the origin of the great vessels.    NONVASCULAR STRUCTURES: 14 mm nodule left thyroid gland. Ultrasound recommended, if not done previously.      Impression    IMPRESSION:   HEAD CT:  1.  Mild asymmetric hypodensity involving anterior aspect of left basal ganglia with involvement of anterior limb of left internal capsule. This could be due to a chronic ischemic change however subacute ischemia could have similar appearance.  2.  No hemorrhage, mass, or mass effect.  3.  Moderate presumed chronic small vessel ischemia.  4.  Mild atrophy.    HEAD CTA:   1.  Normal CTA [place].    NECK CTA:  1.  Normal neck CTA.  2.  14 mm nodule left thyroid gland; ultrasound recommended, if not done previously.    Results were called to Dr. Mcknight at 7/16/2024 5:34 PM CDT.   MR Brain w/o Contrast    Narrative    EXAM: MR BRAIN W/O CONTRAST  LOCATION: [facility]  DATE: 7/17/2024    INDICATION: Acute ischemic stroke  COMPARISON: 7/16/2024  TECHNIQUE: Routine multiplanar multisequence head MRI without intravenous contrast.    FINDINGS:  INTRACRANIAL CONTENTS: Small focus of cortical and subcortical restricted diffusion and T2 FLAIR hyperintensity in the right precentral gyrus just lateral to the putative hand knob. No additional restricted diffusion to suggest additional recent   infarction. Small old lacunar type infarction in the dorsal right lentiform nucleus/posterior limb of the right internal capsule. No mass, acute hemorrhage, or extra-axial fluid collections. Patchy nonspecific T2/FLAIR hyperintensities within the   cerebral white matter and sirisha most consistent with mild to moderate chronic microvascular ischemic change. Normal ventricles and sulci. Normal position of the cerebellar tonsils.     SELLA: No abnormality accounting for technique.    OSSEOUS STRUCTURES/SOFT TISSUES: Normal marrow signal. The major intracranial vascular flow voids are maintained.     ORBITS: No abnormality accounting for technique.     SINUSES/MASTOIDS: No paranasal sinus mucosal disease. No middle ear or mastoid effusion.       Impression    IMPRESSION:  1.  Small late acute to early subacute infarction involving the right precentral gyrus and subcortical white matter.  2.  Small old lacunar type infarction in the dorsal lentiform nucleus/posterior limb of the right internal capsule.  3.  Mild to moderate presumed chronic small vessel ischemic changes.  4.  No additional acute intracranial abnormality.   US Thyroid    Narrative    EXAM: US THYROID  LOCATION: [facility]  DATE: 7/17/2024    INDICATION: Thyroid nodule on recent neck CT.  COMPARISON: CTA head and neck 7/16/2024  TECHNIQUE: Thyroid ultrasound.     FINDINGS:  RIGHT lobe: 3.5 x 0.9 x 0.9 cm. There are a few tiny scattered cystic partially cystic nodules.  Isthmus: 2 mm.  LEFT lobe: 3.8 x 1.9 x  1.7 cm. There a nodule inferiorly described below.    NECK: No cervical lymphadenopathy.    NODULES:    Nodule 1: 2 x 1.3 x 1 cm   Composition: Cystic or almost completely cystic, 0 points   Echogenicity: Anechoic, 0 points   Shape: Wider-than-tall, 0 points   Margin: Smooth, 0 points   Echogenic Foci: None, or large comet-tail artifacts, 0 points   Point Total: 0 points. TI-RADS 1. No FNA.          Impression    IMPRESSION:  1.  Benign cystic nodules in both lobes, largest on the left.   2.  No follow-up needed.      Nodules are characterized per  ACR Thyroid Imaging, Reporting and Data System (TI-RADS): White Paper of the ACR TI-RADS Committee  Blake Teixeira. et al. Journal of the American College of Radiology 2017. Volume 14 (2017), Issue 5, 417-452.      CT Head w/o Contrast    Narrative    EXAM: CT HEAD W/O CONTRAST  LOCATION: [facility]  DATE: 7/17/2024    INDICATION: Acute CVA status post TPA.  COMPARISON: Brain MRI 7/17/2024.  TECHNIQUE: Routine CT Head without IV contrast. Multiplanar reformats. Dose reduction techniques were used.    FINDINGS:  INTRACRANIAL CONTENTS: No intracranial hemorrhage, extraaxial collection, or mass effect. The acute right frontal lobe infarct seen on prior MRI is not well visualized on CT. No definite new infarct. Mild volume loss and presumed chronic small vessel   ischemia are stable.    VISUALIZED ORBITS/SINUSES/MASTOIDS: No intraorbital abnormality. No paranasal sinus mucosal disease. No middle ear or mastoid effusion.    BONES/SOFT TISSUES: No acute abnormality.      Impression    IMPRESSION:  1.  No hemorrhage or mass effect.  2.  Small right frontal infarct seen on MRI is not well visualized on CT. No definite new infarct.   Echocardiogram Complete - For age > 60 yrs    Narrative    [id number]  [id number]  [id number]  [id number]^CELE[place]INO^[place] [address]     Name: GLENIS BENITO  MRN:  [id number]  : 1950  Study Date: 2024 11:06 AM  Age: 74 yrs  Gender: Female  Patient Location: [facility]  Reason For Study: Cerebrovascular Incident  Ordering Physician: RUBIA TIRADO  Performed By: NESTOR     BSA: 1.6 m2  Height: 65 in  Weight: 128 lb  HR: 62  BP: 164/90 mmHg  ______________________________________________________________________________  Procedure  Complete Portable Echo Adult.  ______________________________________________________________________________  Interpretation Summary     1. Normal left ventricular size and systolic performance with a visually  estimated ejection fraction of 60-65%.  2. There is trace aortic insufficiency.  3. Normal right ventricular size and systolic performance.  ______________________________________________________________________________  Left ventricle:  Normal left ventricular size and systolic performance with a visually  estimated ejection fraction of 60-65%. There is normal regional wall motion.  Left ventricular wall thickness is normal.     Assessment of LV Diastolic Function: The cumulative findings suggest normal  diastolic filling [The septal e' velocity is < 7 cm/s & lateral e' velocity  is  < 10 cm/s. The average E/e' is < 14. The TR velocity cannot be determined due  to insufficient tricuspid insufficiency signal. Left atrial volume index is  less than 34 mL/mÂ ].     Right ventricle:  Normal right ventricular size and systolic performance.     Left atrium:  The left atrium is of normal size.     Right atrium:  The right atrium is of normal size.     IVC:  The IVC is of normal caliber.     Aortic valve:  The aortic valve is comprised of three cusps. There is no significant aortic  stenosis. There is trace aortic insufficiency.     Mitral valve:  The mitral valve appears morphologically normal. There is trace mitral  insufficiency.     Tricuspid valve:  The tricuspid valve is grossly morphologically normal. There is trace  tricuspid  insufficiency.     Pulmonic valve:  The pulmonic valve is grossly morphologically normal. There is trace pulmonic  insufficiency.     Thoracic aorta:  The aortic root and proximal ascending aorta are of normal dimension.     Pericardium:  There is no significant pericardial effusion.  ______________________________________________________________________________  ______________________________________________________________________________  MMode/2D Measurements & Calculations  IVSd: 1.1 cm  LVIDd: 4.1 cm  LVIDs: 2.8 cm  LVPWd: 1.1 cm  FS: 32.8 %  LV mass(C)d: 146.8 grams  LV mass(C)dI: 89.8 grams/m2  Ao root diam: 3.1 cm  asc Aorta Diam: 3.5 cm  LVOT diam: 2.1 cm  LVOT area: 3.5 cm2  Ao root diam index Ht(cm/m): 1.9  Ao root diam index BSA (cm/m2): 1.9  Asc Ao diam index BSA (cm/m2): 2.1  Asc Ao diam index Ht(cm/m): 2.1  EF Biplane: 64.8 %  LA Volume (BP): 51.4 ml     LA Volume Index (BP): 31.5 ml/m2  LA Volume Indexed (AL/bp): 33.1 ml/m2  RWT: 0.53  TAPSE: 1.8 cm     Time Measurements  MM HR: 66.0 BPM     Doppler Measurements & Calculations  MV E max jean-pierre: 53.7 cm/sec  MV A max jean-pierre: 68.9 cm/sec  MV E/A: 0.78  MV dec slope: 216.0 cm/sec2  MV dec time: 0.25 sec  Ao V2 max: 105.0 cm/sec  Ao max P.0 mmHg  Ao V2 mean: 65.8 cm/sec  Ao mean P.0 mmHg  Ao V2 VTI: 18.9 cm  CRISTOFER(I,D): 3.6 cm2  CRISTOFER(V,D): 3.2 cm2  LV V1 max PG: 3.8 mmHg  LV V1 max: 96.9 cm/sec  LV V1 VTI: 19.8 cm  SV(LVOT): 68.6 ml  SI(LVOT): 41.9 ml/m2  PA acc time: 0.12 sec  Pulm Sys Jean-Pierre: 53.0 cm/sec  Pulm Tony Jean-Pierre: 29.6 cm/sec  Pulm A Revs Jean-Pierre: 48.0 cm/sec  Pulm S/D: 1.8  AV Jean-Pierre Ratio (DI): 0.92  CRISTOFER Index (cm2/m2): 2.2  E/E': 14.9  E/E' av.0     Lateral E/e': 7.0  Medial E/e': 15.0  Peak E' Jean-Pierre: 3.6 cm/sec  RV S Jean-Pierre: 10.7 cm/sec     ______________________________________________________________________________  Report approved by: Radha Landry 2024 12:10 PM             Discharge Medications   Current Discharge Medication List         START taking these medications    Details   aspirin 81 MG EC tablet Take 1 tablet (81 mg) by mouth daily  Qty: 30 tablet, Refills: 2    Associated Diagnoses: Acute CVA (cerebrovascular accident) (H)      hydrALAZINE (APRESOLINE) 10 MG tablet Take 1 tablet (10 mg) by mouth 3 times daily as needed for high blood pressure (Take one tablet if systolic blood pressure (top number) is greater than 190)  Qty: 30 tablet, Refills: 0    Associated Diagnoses: Acute CVA (cerebrovascular accident) (H)      losartan (COZAAR) 25 MG tablet Take 0.5 tablets (12.5 mg) by mouth daily  Qty: 30 tablet, Refills: 0    Associated Diagnoses: Essential hypertension           CONTINUE these medications which have NOT CHANGED    Details   cholecalciferol, vitamin D3, (VITAMIN D3) 2,000 unit capsule Take 1,000 Units by mouth once a week           Allergies   No Known Allergies

## 2024-07-18 NOTE — PLAN OF CARE
Problem: Stroke, Ischemic (Includes Transient Ischemic Attack)  Goal: Optimal Functional Ability  Intervention: Optimize Functional Ability  Recent Flowsheet Documentation  Taken 7/17/2024 2113 by Tammy Mott, RN  Activity Management:   activity adjusted per tolerance   activity encouraged     Problem: Comorbidity Management  Goal: Blood Pressure in Desired Range  Outcome: Progressing  Intervention: Maintain Blood Pressure Management  Recent Flowsheet Documentation  Taken 7/17/2024 2113 by Tammy Mott, RN  Medication Review/Management:   medications reviewed   high-risk medications identified   pharmacy consulted   Goal Outcome Evaluation:        Patient A/O x4. Denied pain. VSS on RA. NIH scored 0 this shift. Tele running NSR. Patient is IND in room. Able to make all needs know. Call light with in reach and bed alarm activated.

## 2024-07-18 NOTE — PHARMACY-CONSULT NOTE
Pharmacy Consult to evaluate for medication related stroke core measures    Marifer RJ [place], 74 year old female admitted for acute infarction on 7/16/2024.    Thrombolytic was given on 7/16 at 1800.    VTE Prophylaxis SCDs /PCDs placed on 7/17, as appropriate prior to end of hospital day 2.    Antithrombotic: aspirin started on 7/18, as appropriate by end of hospital day 2. Continue antithrombotic therapy on discharge to meet quality measures, unless contraindicated.    Anticoagulation if history of A-fib/flutter: Patient does not have history of A-fib/flutter - anticoagulation not required for medication related stroke core measures.     LDL Cholesterol Calculated   Date Value Ref Range Status   07/17/2024 67 <=100 mg/dL Final       No statin therapy necessary given LDL within goal range per neuro    Recommendations: None at this time    Thank you for the consult.    Vero Carter, [facility] 7/18/2024 9:38 AM

## 2024-07-18 NOTE — PLAN OF CARE
Problem: Stroke Rehabilitation  Goal: Optimal Adjustment to Stroke  Outcome: Progressing   Goal Outcome Evaluation:       Stroke Education Note    The following information was reviewed with patient and significant other:    - Activation of emergency medical system (when to call 911)    - Individualized risk factors for stroke:      high blood pressure    - Warning signs and symptoms of stroke:   B = Balance loss   E = Eyesight changes   F = Facial droop or numbness   A = Arm or leg weakness   S = Speech difficulty, slurred speech   T = Time to call 911 for help    Written stroke educational materials given:   - Learning about BE FAST: Stroke Warning Signs and Learning about Risk Factors for Stroke (Healthwise)   - Understanding Stroke: Key Resources After a Stroke (FOD #209446)    Learner's response to education:     taking steps     Linden Mcconnell RN

## 2024-07-18 NOTE — PLAN OF CARE
Problem: Comorbidity Management  Goal: Blood Pressure in Desired Range  Outcome: Progressing     Problem: Stroke, Ischemic (Includes Transient Ischemic Attack)  Goal: Optimal Cognitive Function  Outcome: Progressing   Goal Outcome Evaluation:  Marifer A&Ox4, very pleasant. Room air. Independent in room. NSR on telemetry. Denied pain. NIH 0, no neuro changes overnight. She reports her left hand feeling slightly clumsy.    BP elevated overnight. 10mg IV hydralazine given around 0000 for /90. Around 0300 BP was 200/91. Pt c/o her heart was pounding and temples were pounding. Denied pain. Also had c/o she was unable to sleep and of restless legs. IV labetalol given with good effect, recheck /78. Melatonin given and legs wrapped in warm blankets and Marifer appeared to be able to sleep after that. [place] was notified of high BP and symptoms.

## 2024-07-18 NOTE — CARE PLAN
Stroke Education Note    The following information was reviewed with patient:    - Activation of emergency medical system (when to call 911)    - Individualized risk factors for stroke:      high blood pressure    - Warning signs and symptoms of stroke:   B = Balance loss   E = Eyesight changes   F = Facial droop or numbness   A = Arm or leg weakness   S = Speech difficulty, slurred speech   T = Time to call 911 for help    Learner's response to education:     activation    taking steps    not applicable, precontemplative     Clarita Wilkes RN

## 2024-07-18 NOTE — PROGRESS NOTES
[facility] Neurology Progress Note    Marifer Temple MRN# [id number]   Age: 74 year old YOB: 1950          Assessment and Plan:   Assessment:  L-basal ganglia infarction, acute     The patient had acute onset let hand weakness, with slurred speech and facial droop leading to TNK after presenting to the ED.  Imaging has confirmed an acute infarction of the L-BG and absent other vascular issues (stenosis, dissection, occlusion, aneurysm).  Unclear etiology given her relatively healthy state, and limited stroke risk factors along with normal echocardiogram.  Long term small vessel disease related control will hopefully provide improved stroke prophylaxis, specifically towards issues of HTN if seen with long term monitoring.      Plan:  - ASA 81 mg every day  - /105 for short term              - long term goals, after discharge would be under 130/85  - Holter monitor (30 days) at discharge  - Statin therapy not necessarily needed given LDL is w/in goals (under 70)  - A1c goals under 7.0  - PT/OT/SLP    EMILY Larkin D.O.  [facility] Neurology  Pager: [phone]    Total time today (37 min) in this patient encounter was spent on pre-charting, counseling and/or coordination of care.         History of Presenting Symptoms:   Marifer Temple is a 74 year old female who presents today for evaluation of CVA.  The patient presented 7/16/2024 to the ED with acute onset left hand weakness, slurred speech, facial droop occurring earlier that day.  CT showed left basal ganglia hypodensities. NIHSS was 4. Stroke consultation led to recommendation for TNK, but delivery was delayed slightly due to blood pressure management (initial was 235/109).  She was admitted to ICU with plans to repeat CT head in 24 hours, and obtain further stroke evaluation (MRI brain, TTE).  Consideration of platelets starting 24 hours after TNK with stable CT was made.     MRI brain 7/17/2024 showed restricted  "diffusion of right pre-central gyrus, and small old lacunar infarctions of right IC and lentiform nucleus/posterior limb.  Impression was for acute to subacute right precentral infarction and chronic right internal capsule and posterior limb infarctions.    Interval History since last visit   Repeat Head CT overnight was normal.  Patient doesn't report any new symptoms.  She feels her left hand is still a little clumsy, but improving over time.  She has no ongoing headache, but also didn't sleep well last night and felt there was some b/l temporal pressure (low on pain scale).     Physical Exam:   Vitals: BP (!) 156/78 (BP Location: Left arm, Patient Position: Semi-Le's, Cuff Size: Adult Small)   Pulse 71   Temp 98.1  F (36.7  C) (Oral)   Resp 16   Ht 1.6 m (5' 3\")   Wt 55.2 kg (121 lb 11.1 oz)   SpO2 97%   BMI 21.56 kg/m    General: Seated comfortably in no acute distress.  Neurologic:     Mental Status: Fully alert, attentive and oriented. Speech clear and fluent, no paraphasic errors.     Cranial Nerves: EOMI with normal smooth pursuit. Facial movements symmetric. Hearing not formally tested but intact to conversation.  No dysarthria.     Motor: No tremors or other abnormal movements observed.  Mild drift not noted today on left. Open hand to closed hand testing normal today.  Large volume movement to fine motor movement on left hand seems slowed compared to right.    Right Left   Arm abduction 5/5 5/5   Elbow Flex 5/5 5/5   Elbow Extension 5/5 5/5   Wrist Extension 5/5 5/5   FDI  5/5 5/5   APB 5/5 5/5     5/5 5/5   Hip FLexion 5/5 5/5   Knee Flexion 5/5 5/5   Knee Extension 5/5 5/5   Dorsiflexion  5/5 5/5        Sensory:Negative Romberg. Vibration and pain testing in b/l lower and upper extremities is normal.     DTR: 2+ and symmetric throughout UE and LE b/l. No clonus.     Coordination: Finger-nose-finger without dysmetria.     Gait: Normal, steady casual gait.       Data: Pertinent since last " visit   MRI brain: 7/17/2024:  IMPRESSION:  1.  Small late acute to early subacute infarction involving the right precentral gyrus and subcortical white matter.  2.  Small old lacunar type infarction in the dorsal lentiform nucleus/posterior limb of the right internal capsule.  3.  Mild to moderate presumed chronic small vessel ischemic changes.  4.  No additional acute intracranial abnormality.    CTA head and neck: 7/16/2024:  IMPRESSION:   HEAD CT:  1.  Mild asymmetric hypodensity involving anterior aspect of left basal ganglia with involvement of anterior limb of left internal capsule. This could be due to a chronic ischemic change however subacute ischemia could have similar appearance.  2.  No hemorrhage, mass, or mass effect.  3.  Moderate presumed chronic small vessel ischemia.  4.  Mild atrophy.  HEAD CTA:   1.  Normal CTA [place].  NECK CTA:  1.  Normal neck CTA.  2.  14 mm nodule left thyroid gland; ultrasound recommended, if not done previously.     Procedures:  Echocardiogram: 7/16 reported as normal  Interpretation Summary  1. Normal left ventricular size and systolic performance with a visually estimated ejection fraction of 60-65%.  2. There is trace aortic insufficiency.  3. Normal right ventricular size and systolic performance.    Laboratory:  7/17/2024:  LDL 67  A1c 5.1

## 2024-07-22 ENCOUNTER — PATIENT OUTREACH (OUTPATIENT)
Dept: CARE COORDINATION | Facility: CLINIC | Age: 74
End: 2024-07-22
Payer: COMMERCIAL

## 2024-07-22 NOTE — PROGRESS NOTES
Clinic Care Coordination Contact  Transitions of Care Outreach  Chief Complaint   Patient presents with    Clinic Care Coordination - Post Hospital       Most Recent Admission Date: 7/16/2024   Most Recent Admission Diagnosis: Left arm weakness - R29.898  Acute CVA (cerebrovascular accident) (H) - I63.9     Most Recent Discharge Date: 7/18/2024   Most Recent Discharge Diagnosis: Acute CVA (cerebrovascular accident) (H) - I63.9  Left arm weakness - R29.898  Essential hypertension - I10  Troponin level elevated - R79.89     Transitions of Care Assessment    Discharge Assessment  How are you doing now that you are home?: I think I am doing pretty well.  How are your symptoms? (Red Flag symptoms escalate to triage hotline per guidelines): Improved  Do you know how to contact your clinic care team if you have future questions or changes to your health status? : Yes  Does the patient have their discharge instructions? : Yes  Does the patient have questions regarding their discharge instructions? : Yes (see comment)  Were you started on any new medications or were there changes to any of your previous medications? : Yes  Does the patient have all of their medications?: Yes  Do you have questions regarding any of your medications? : No  Do you have all of your needed medical supplies or equipment (DME)?  (i.e. oxygen tank, CPAP, cane, etc.): Yes         Post-op (Clinicians Only)  Did the patient have surgery or a procedure: No  Fever: No  Chills: No  Eating & Drinking: eating and drinking without complaints/concerns  PO Intake: regular diet  Bowel Function: normal  Urinary Status: voiding without complaint/concerns    Care Management       Care Mgmt General Assessment      CCC RN spoke with patient today to follow up on her recent hospitalizations. Patient stated she is taking her medications daily as directed and monitoring her blood pressure twice daily. She stated her blood pressure remains stable. She has her  hydralazine and is aware when to use it. Patient reported good support from her family. She has a follow up with Dr. Ott on 8/9/24. No other needs or concerns.                    Follow up Plan     Discharge Follow-Up  Discharge follow up appointment scheduled in alignment with recommended follow up timeframe or Transitions of Risk Category? (Low = within 30 days; Moderate= within 14 days; High= within 7 days): Yes  Discharge Follow Up Appointment Date: 08/09/24  Discharge Follow Up Appointment Scheduled with?: Primary Care Provider    Future Appointments   Date Time Provider Department Center   8/9/2024  2:00 PM Siomara Ott MD [facility]   8/30/2024  7:50 AM Zenaida Farooq MD [facility]       Outpatient Plan as outlined on AVS reviewed with patient.    For any urgent concerns, please contact our 24 hour nurse triage line: [phone] ([phone])       David J. Myhre, RN

## 2024-07-24 LAB
ATRIAL RATE - MUSE: 80 BPM
DIASTOLIC BLOOD PRESSURE - MUSE: 89 MMHG
INTERPRETATION ECG - MUSE: NORMAL
P AXIS - MUSE: 52 DEGREES
PR INTERVAL - MUSE: 158 MS
QRS DURATION - MUSE: 66 MS
QT - MUSE: 378 MS
QTC - MUSE: 435 MS
R AXIS - MUSE: 7 DEGREES
SYSTOLIC BLOOD PRESSURE - MUSE: 185 MMHG
T AXIS - MUSE: 34 DEGREES
VENTRICULAR RATE- MUSE: 80 BPM

## 2024-08-09 ENCOUNTER — OFFICE VISIT (OUTPATIENT)
Dept: INTERNAL MEDICINE | Facility: CLINIC | Age: 74
End: 2024-08-09
Payer: COMMERCIAL

## 2024-08-09 VITALS
HEIGHT: 62 IN | WEIGHT: 136.6 LBS | SYSTOLIC BLOOD PRESSURE: 122 MMHG | OXYGEN SATURATION: 100 % | TEMPERATURE: 99.2 F | DIASTOLIC BLOOD PRESSURE: 78 MMHG | RESPIRATION RATE: 12 BRPM | HEART RATE: 78 BPM | BODY MASS INDEX: 25.14 KG/M2

## 2024-08-09 DIAGNOSIS — Z12.31 ENCOUNTER FOR SCREENING MAMMOGRAM FOR BREAST CANCER: ICD-10-CM

## 2024-08-09 DIAGNOSIS — Z12.11 ENCOUNTER FOR SCREENING FOR MALIGNANT NEOPLASM OF COLON: ICD-10-CM

## 2024-08-09 DIAGNOSIS — Z29.11 NEED FOR VACCINATION AGAINST RESPIRATORY SYNCYTIAL VIRUS: ICD-10-CM

## 2024-08-09 DIAGNOSIS — Z11.59 NEED FOR HEPATITIS C SCREENING TEST: Primary | ICD-10-CM

## 2024-08-09 DIAGNOSIS — Z23 NEED FOR SHINGLES VACCINE: ICD-10-CM

## 2024-08-09 DIAGNOSIS — R93.89 ABNORMAL FINDINGS ON DIAGNOSTIC IMAGING OF OTHER SPECIFIED BODY STRUCTURES: ICD-10-CM

## 2024-08-09 DIAGNOSIS — Z12.31 VISIT FOR SCREENING MAMMOGRAM: ICD-10-CM

## 2024-08-09 DIAGNOSIS — M81.0 OSTEOPOROSIS, UNSPECIFIED OSTEOPOROSIS TYPE, UNSPECIFIED PATHOLOGICAL FRACTURE PRESENCE: ICD-10-CM

## 2024-08-09 DIAGNOSIS — R79.89 AZOTEMIA: ICD-10-CM

## 2024-08-09 DIAGNOSIS — E55.9 VITAMIN D DEFICIENCY: ICD-10-CM

## 2024-08-09 DIAGNOSIS — I63.9 CEREBROVASCULAR ACCIDENT (CVA), UNSPECIFIED MECHANISM (H): ICD-10-CM

## 2024-08-09 LAB
ALBUMIN SERPL BCG-MCNC: 4.3 G/DL (ref 3.5–5.2)
ALP SERPL-CCNC: 120 U/L (ref 40–150)
ALT SERPL W P-5'-P-CCNC: 11 U/L (ref 0–50)
ANION GAP SERPL CALCULATED.3IONS-SCNC: 11 MMOL/L (ref 7–15)
AST SERPL W P-5'-P-CCNC: 25 U/L (ref 0–45)
BILIRUB SERPL-MCNC: 0.8 MG/DL
BUN SERPL-MCNC: 22.9 MG/DL (ref 8–23)
CALCIUM SERPL-MCNC: 9.4 MG/DL (ref 8.8–10.4)
CHLORIDE SERPL-SCNC: 106 MMOL/L (ref 98–107)
CREAT SERPL-MCNC: 1.2 MG/DL (ref 0.51–0.95)
CREAT UR-MCNC: 209 MG/DL
EGFRCR SERPLBLD CKD-EPI 2021: 47 ML/MIN/1.73M2
GLUCOSE SERPL-MCNC: 90 MG/DL (ref 70–99)
HCO3 SERPL-SCNC: 26 MMOL/L (ref 22–29)
MICROALBUMIN UR-MCNC: 25.7 MG/L
MICROALBUMIN/CREAT UR: 12.3 MG/G CR (ref 0–25)
POTASSIUM SERPL-SCNC: 4.4 MMOL/L (ref 3.4–5.3)
PROT SERPL-MCNC: 7.7 G/DL (ref 6.4–8.3)
SODIUM SERPL-SCNC: 143 MMOL/L (ref 135–145)
TSH SERPL DL<=0.005 MIU/L-ACNC: 3.54 UIU/ML (ref 0.3–4.2)
VIT D+METAB SERPL-MCNC: 36 NG/ML (ref 20–50)

## 2024-08-09 PROCEDURE — 80053 COMPREHEN METABOLIC PANEL: CPT | Performed by: INTERNAL MEDICINE

## 2024-08-09 PROCEDURE — 99496 TRANSJ CARE MGMT HIGH F2F 7D: CPT | Performed by: INTERNAL MEDICINE

## 2024-08-09 PROCEDURE — 82570 ASSAY OF URINE CREATININE: CPT | Performed by: INTERNAL MEDICINE

## 2024-08-09 PROCEDURE — 36415 COLL VENOUS BLD VENIPUNCTURE: CPT | Performed by: INTERNAL MEDICINE

## 2024-08-09 PROCEDURE — 84443 ASSAY THYROID STIM HORMONE: CPT | Performed by: INTERNAL MEDICINE

## 2024-08-09 PROCEDURE — 82306 VITAMIN D 25 HYDROXY: CPT | Performed by: INTERNAL MEDICINE

## 2024-08-09 PROCEDURE — 82043 UR ALBUMIN QUANTITATIVE: CPT | Performed by: INTERNAL MEDICINE

## 2024-08-09 NOTE — PROGRESS NOTES
"  Assessment & Plan     Need for shingles vaccine    Need for vaccination against respiratory syncytial virus    - RSV vaccine, bivalent, ABRYSVO, injection; Inject 0.5 mLs into the muscle once for 1 dose Pharmacist administered    Need for hepatitis C screening test      Visit for screening mammogram  Behind in screening . Discussed and ordered     Azotemia  Reactionary will recheck labs   - Comprehensive metabolic panel; Future  - Albumin Random Urine Quantitative with Creat Ratio; Future  - Comprehensive metabolic panel  - Albumin Random Urine Quantitative with Creat Ratio    Encounter for screening for malignant neoplasm of colon    - Colonoscopy Screening  Referral; Future    Encounter for screening mammogram for breast cancer    - MA Screen Bilateral w/[place]; Future    Osteoporosis, unspecified osteoporosis type, unspecified pathological fracture presence    - DX Bone Density; Future    Vitamin D deficiency    - TSH; Future  - Vitamin D Deficiency; Future  - TSH  - Vitamin D Deficiency    Abnormal findings on diagnostic imaging of other specified body structures    - TSH; Future  - TSH    Cerebrovascular accident (CVA), unspecified mechanism (H)  No cause found await 30 day even monitor   Was hypertensive but could be reactive HTN but is now normal (  ) on low dose losartan   Not diabetic    On apsirin    Neurology didnt feel statin was necessary due to low LDL will check again   Not on any meds that would make her hypercoagulable  Recommend continuing anti-hypertensive meds    Location of cva was right precentral gyrus and subcortical white matte   PT/OT discharged her   She appears to have no residual effects     MED REC REQUIRED  Post Medication Reconciliation Status: discharge medications reconciled, continue medications without change  BMI  Estimated body mass index is 25.23 kg/m  as calculated from the following:    Height as of this encounter: 1.567 m (5' 1.7\").    Weight as of this encounter: " 62 kg (136 lb 9.6 oz).             Laurence Caicedo is a 74 year old, presenting for the following health issues:  Admitted to the hospital with an acute dysarthria found to have right precentral gyrus and subcortical white matte acute infarction   Echo NAD  No overt arrhythmia ( has 30 day event monitor in place)   MRA brain and neck didn't note any stenosis   Hospital F/U (Follow up after 7/16 - 7/18/24 IP stay for CVA)        8/9/2024     1:55 PM   Additional Questions   Roomed by Ronald WARREN RN   Accompanied by Glenn HIGGINS         [facility] Follow-up Visit:    Hospital/Nursing Home/IP Rehab Facility: [facility]  Date of Admission: 7/16/24  Date of Discharge: 7/18/24  Reason(s) for Admission: CVA  Was the patient in the ICU or did the patient experience delirium during hospitalization?  Yes         8/9/2024     2:03 PM   Mini-Cog Total Score   Mini Cog Total Score 4     Do you have any other stressors you would like to discuss with your provider? No    Problems taking medications regularly:  None  Medication changes since discharge: Hydralazine, aspirin, losartan  Problems adhering to non-medication therapy:  None    Summary of hospitalization:    Diagnostic Tests/Treatments reviewed.  Follow up needed: none  Other Healthcare Providers Involved in Patient s Care:         None  Update since discharge: improved.         Plan of care communicated with patient         Presented with sudden onset slurred speech, LUE weakness, left facial droop. Admitted to ICU s/p tPA given in the ED. CT head concerning for possible acute ischemia. MRI brain showed small, late acute vs subacute infarct of the right precentral gyrus and subcortical white matter; small old lacunar infarct of the posterior limb of the right internal capsule/dorsal lentiform nucleus; mild to mdoerate chronic small vessel changes. Symptoms significantly improved. Follow up CT head at 24 hours was without hemorrhage.  - Neurology  consulted  - Start ASA 81mg daily indefinitely  - BP goal <180/105 while here, this was improved by discharge, goal <130/80 long-term  - TTE normal  - Tele without Afib while here  - PT/OT/SLP evaluated and recommended no further therapy due to functional gains in the hospital  - A1C 5.1, LDL 67; both at goal, no need for statin  - 30-day cardiac monitor placed prior to discharge     #Primary hypertension  Patient discontinued HCTZ and clonidine > 5 years ago. Long term goal <130/80. Hasn't needed meds lately. The above severe HTN is common post-CVA and often corrects itself, but given how high it was getting, will start low-dose treatment that she may not need going forward.  - Start losartan 12.5mg daily  - Given hydralazine 10mg PO TID PRN for SBP >190, hopefully will not need this much if at all  - Instructed patient to check BP twice a day at rest or if she feels symptoms she thinks could be elevated BP  - Record blood pressures and see PCP in 1-2 weeks to determine if losartan should be continued or changed     #Hypernatremia  Na to 147 to 146. Discussed with patient to hydrate a little extra today     #CKD stage 3a  Cr 0.99 on admission, at baseline.     #Non-MI troponin elevation  Trop 65 to 64. Probably due to severe HTN on admission. No CP or concern for ACS. No WMA on TTE.  - BP control as above     #Thyroid nodules  Thyroid US here showed benign cystic nodules in both lobes, radiology recommended no further follow up. TSH 2.48.     #Leukocytosis, resolved  Afebrile. No respiratory urinary complaints. Probably reactive to CVA and HTN.      Normal CTA         Current Outpatient Medications   Medication Sig Dispense Refill    aspirin 81 MG EC tablet Take 1 tablet (81 mg) by mouth daily 30 tablet 2    cholecalciferol, vitamin D3, (VITAMIN D3) 2,000 unit capsule Take 1,000 Units by mouth once a week      losartan (COZAAR) 25 MG tablet Take 0.5 tablets (12.5 mg) by mouth daily 30 tablet 0     No current  "facility-administered medications for this visit.           Objective    /78 (BP Location: Left arm, Patient Position: Sitting, Cuff Size: Adult Regular)   Pulse 78   Temp 99.2  F (37.3  C) (Tympanic)   Resp 12   Ht 1.567 m (5' 1.7\")   Wt 62 kg (136 lb 9.6 oz)   SpO2 100%   BMI 25.23 kg/m    Body mass index is 25.23 kg/m .  Physical Exam   GENERAL: alert and no distress  NECK: no adenopathy, no asymmetry, masses, or scars  RESP: lungs clear to auscultation - no rales, rhonchi or wheezes  CV: regular rate and rhythm, normal S1 S2, no S3 or S4, no murmur, click or rub, no peripheral edema  ABDOMEN: soft, nontender, no hepatosplenomegaly, no masses and bowel sounds normal  MS: no gross musculoskeletal defects noted, no edema  Gait normal          Signed Electronically by: Siomara Ott MD    "

## 2024-08-20 PROCEDURE — 93272 ECG/REVIEW INTERPRET ONLY: CPT | Performed by: INTERNAL MEDICINE

## 2024-08-21 ENCOUNTER — TELEPHONE (OUTPATIENT)
Dept: INTERNAL MEDICINE | Facility: CLINIC | Age: 74
End: 2024-08-21
Payer: COMMERCIAL

## 2024-08-21 DIAGNOSIS — I48.92 ATRIAL FLUTTER, UNSPECIFIED TYPE (H): Primary | ICD-10-CM

## 2024-08-21 NOTE — TELEPHONE ENCOUNTER
Patient notified of Dr. Ott's response. Explained atrial fibrillation/flutter to patient and need for blood thinner. Patient verbalized understanding. Gave patient options of warfarin, Eliquis, and Xarelto. Explained cost and INR monitoring.     Patient states she will take whatever Dr. Ott recommends. She states her  takes Eliquis and it is covered by their insurance. Patient would be agreeable to taking Eliquis, but again she states she would take whatever Dr. Ott recommended. Notified patient that Dr. Ott recommends starting blood thinner before seeing cardiology.     Patient aware that Dr. Ott will try to call her in between appointments. She is very appreciative.

## 2024-08-21 NOTE — TELEPHONE ENCOUNTER
Please tell patient that the event monitor did show episodic atrial flutter/fibrillation.  Given her risk factors and stroke history she will need prescription blood thinner much stronger than aspirin.  This would be either warfarin, Eliquis or Xarelto.  I am going to see my patients and will try and call her in between patients.  If you could give her info on the difference between the 3 including cost and the INR monitoring so that she is prepared and then I will call her  I do think she should start the prescription before she sees cardiology.  Route back to me with her comments thank you

## 2024-08-21 NOTE — TELEPHONE ENCOUNTER
Patient admitted 7/16/24-7/18/24 for acute ischemic CVA. Patient calls regarding Cardiac Event Monitor (7/18-8/16).    Patient scheduled with Cardiology 8/30/24. She attempted to call them to discuss results. Since she has not been seen by Cardiology, they are unable to discuss until she has appointment with them.     Patient would like a phone call from Dr. Ott regarding results. She would like reassurance that she is to continue doing what she is doing and that it is okay to wait until 8/30/24 for appointment with Cardiology.     Informed patient that Dr. Ott does not have any availability for appointment today. Informed patient that her request would be forwarded to Dr. Ott to advise on next steps. She verbalized understanding.     If patient does not answer mobile phone number, she requests we attempt home phone number.     MARQUITA Gonzalez   [facility]

## 2024-08-22 ENCOUNTER — TELEPHONE (OUTPATIENT)
Dept: INTERNAL MEDICINE | Facility: CLINIC | Age: 74
End: 2024-08-22
Payer: COMMERCIAL

## 2024-08-22 NOTE — TELEPHONE ENCOUNTER
Spoke with patient and relayed information below from Dr Ott. Patient verbalized understanding and has no further questions.      ----- Message from Siomara Ott sent at 8/21/2024 10:23 PM CDT -----  Please call patient and tell her I  sent in the eliquis to [facility] . She should stop the aspirin for now unless she gets the ok from cardiology to do both . Aspirin does not stop strokes from atrial fibrillation and increases the risk of bleeding when combined with eliquis and is usually only give in combination if she had concomitant coronary artery disease( a tent ) If she has any questions please let me know

## 2024-08-30 ENCOUNTER — MEDICAL CORRESPONDENCE (OUTPATIENT)
Dept: HEALTH INFORMATION MANAGEMENT | Facility: CLINIC | Age: 74
End: 2024-08-30

## 2024-08-30 ENCOUNTER — OFFICE VISIT (OUTPATIENT)
Dept: CARDIOLOGY | Facility: CLINIC | Age: 74
End: 2024-08-30
Attending: STUDENT IN AN ORGANIZED HEALTH CARE EDUCATION/TRAINING PROGRAM
Payer: COMMERCIAL

## 2024-08-30 VITALS
BODY MASS INDEX: 23.37 KG/M2 | WEIGHT: 127 LBS | SYSTOLIC BLOOD PRESSURE: 154 MMHG | DIASTOLIC BLOOD PRESSURE: 94 MMHG | RESPIRATION RATE: 16 BRPM | HEIGHT: 62 IN | HEART RATE: 74 BPM

## 2024-08-30 DIAGNOSIS — I10 ESSENTIAL HYPERTENSION: ICD-10-CM

## 2024-08-30 DIAGNOSIS — I47.29 NSVT (NONSUSTAINED VENTRICULAR TACHYCARDIA) (H): ICD-10-CM

## 2024-08-30 DIAGNOSIS — R79.89 TROPONIN LEVEL ELEVATED: ICD-10-CM

## 2024-08-30 DIAGNOSIS — I63.9 CEREBROVASCULAR ACCIDENT (CVA), UNSPECIFIED MECHANISM (H): ICD-10-CM

## 2024-08-30 DIAGNOSIS — I48.0 PAROXYSMAL ATRIAL FIBRILLATION (H): Primary | ICD-10-CM

## 2024-08-30 PROCEDURE — 99205 OFFICE O/P NEW HI 60 MIN: CPT | Performed by: INTERNAL MEDICINE

## 2024-08-30 PROCEDURE — 93000 ELECTROCARDIOGRAM COMPLETE: CPT | Performed by: STUDENT IN AN ORGANIZED HEALTH CARE EDUCATION/TRAINING PROGRAM

## 2024-08-30 RX ORDER — METOPROLOL SUCCINATE 25 MG/1
25 TABLET, EXTENDED RELEASE ORAL DAILY
Qty: 30 TABLET | Refills: 6 | Status: SHIPPED | OUTPATIENT
Start: 2024-08-30 | End: 2024-09-05

## 2024-08-30 NOTE — LETTER
8/30/2024    Siomara Ott MD  [address] [facility] [address]    RE: Marifer Temple       Dear Colleague,     I had the pleasure of seeing Marifer Temple in the [facility].      Thank you, Dr. Siomara Ott, for asking the [facility] team to see Ms. Marifer Temple to evaluate paroxysmal atrial flutter/fibrillation, recent CVA.    Assessment/Recommendations   Assessment:    1.  Paroxysmal atrial fib/flutter, documented on recent event monitor following CVA in July.  She does have tendency for rapid ventricular response when in atrial fibrillation/flutter but also has some tendency for borderline sinus bradycardia when in sinus rhythm.  This raises a question of sick sinus syndrome as the potential underlying cause.  Today, she is in A-fib with a ventricular rate of 106.  Recommended initiation of metoprolol succinate 25 mg daily to try to keep her heart rate slower and potentially allow her to convert back to sinus rhythm.  She was initiated on Eliquis anticoagulation by PCP 1 week ago.  Will recommend follow-up in our A-fib clinic in 1 to 2 weeks for consideration of cardioversion if she remains in atrial fibrillation.  2.  Nonsustained VT.  This was documented recently on the event monitor and raises the possibility of underlying coronary artery disease.  She denies any history of exertional chest discomfort or dyspnea but admits to not doing any formal exercise.  Advise pharmacologic nuclear stress study.  3.  Status post right precentral gyrus CVA.  Unclear whether this may have been due to hypertension or atrial dysrhythmia although no evidence of multiple foci of infarction.  There is mention of mild to moderate presumed chronic small vessel ischemic changes which may also been the cause.  4.  Hypertension, inadequately controlled.  Again we will add low-dose metoprolol and follow response.    Plan:  1.  Continue current medications and add metoprolol succinate  25 mg daily  2.  Follow-up in our A-fib clinic in 1 to 2 weeks  3.  Schedule pharmacologic nuclear stress test with further recommendations to follow       History of Present Illness    Ms. Marifer Temple is a 74 year old female with past history of transient hypertension who presented to [facility] in mid July with acute onset of slurred speech and facial drooping.  Was found to have evidence of possible infarct in the left basal ganglia region on head CT although subsequent brain MRI demonstrated evidence of a right precentral gyrus infarct.  ECG demonstrated normal rhythm and no evidence of an atrial arrhythmia was identified over the next 48 hours.  Because her blood pressure was markedly elevated on presentation, she was started on low-dose losartan and discharged home on a combination of aspirin and Plavix.  Since her discharge, she has completed a 30-day event monitor which demonstrated 1 episode of nonsustained VT as well as 2 episodes of atrial fibrillation or flutter.  Was initiated on Eliquis anticoagulation last week and is here today for further evaluation.    Over the course the last 24 to 48 hours, she has noted symptoms of palpitations with elevated heart rates.  Blood pressures have been borderline elevated.  Denies any associated chest discomfort or shortness of breath.  No prior history of exertional chest discomfort or dyspnea although she admits to not doing any formal exercise.  Her sister has a history of atrial fibrillation and a permanent pacemaker.  She states her mother had a pacemaker and multiple TIAs but it is not known whether she had A-fib.    ECG (personally reviewed): ECG in the office today demonstrates atrial fibrillation with rapid ventricular response, rate 106.  Possible inferior and anterior infarct of unknown age.  Atrial fibrillation is new when compared to previous ECGs.    Cardiac Imaging Studies (personally reviewed):   Procedure  Complete Portable Echo  "Adult.  ______________________________________________________________________________  Interpretation Summary     1. Normal left ventricular size and systolic performance with a visually  estimated ejection fraction of 60-65%.  2. There is trace aortic insufficiency.  3. Normal right ventricular size and systolic performance.       Physical Examination Review of Systems   BP (!) 154/94 (BP Location: Left arm, Patient Position: Sitting, Cuff Size: Adult Regular)   Pulse 74   Resp 16   Ht 1.567 m (5' 1.7\")   Wt 57.6 kg (127 lb)   BMI 23.46 kg/m    Body mass index is 23.46 kg/m .  Wt Readings from Last 3 Encounters:   08/30/24 57.6 kg (127 lb)   08/09/24 62 kg (136 lb 9.6 oz)   07/17/24 55.2 kg (121 lb 11.1 oz)     General Appearance:   Awake, Alert, No acute distress.   HEENT:  No scleral icterus; the mucous membranes were pink and moist.   Neck: No cervical bruits or jugular venous distention    Chest: The spine was straight. The chest was symmetric.   Lungs:   Respirations unlabored; the lungs are clear to auscultation. No wheezing   Cardiovascular:   Irregularly irregular rhythm which is mildly rapid.  S1, S2 normal.  No murmur or gallop   Abdomen:  No organomegaly, masses, bruits, or tenderness. Bowels sounds are present   Extremities: No peripheral edema bilaterally   Skin: No xanthelasma. Warm, Dry.   Musculoskeletal: No tenderness.   Neurologic: Mood and affect are appropriate.    Enc Vitals  BP: (!) 154/94  Pulse: 74  Resp: 16  Weight: 57.6 kg (127 lb)  Height: 156.7 cm (5' 1.7\")                                         Medical History  Surgical History Family History Social History   Past Medical History:   Diagnosis Date     Atrial flutter (H)      Hypertension    -Atrial fibrillation Past Surgical History:   Procedure Laterality Date     OTHER SURGICAL HISTORY      bowel obstruction pediatric     OTHER SURGICAL HISTORY      s/p partial colectomy as a child     OTHER SURGICAL HISTORY      right carpal  " tunnel release    Family History   Problem Relation Age of Onset     Cerebrovascular Disease Mother      Leukemia Mother      Hypertension Mother      Colon Cancer Father      Atrial fibrillation Sister      Rheumatoid Arthritis Sister      Arrhythmia Sister      Breast Cancer Maternal Aunt     Social History     Socioeconomic History     Marital status:      Spouse name: Not on file     Number of children: Not on file     Years of education: Not on file     Highest education level: Not on file   Occupational History     Not on file   Tobacco Use     Smoking status: Never     Smokeless tobacco: Never   Vaping Use     Vaping status: Never Used   Substance and Sexual Activity     Alcohol use: Not on file     Drug use: Not on file     Sexual activity: Not on file   Other Topics Concern     Parent/sibling w/ CABG, MI or angioplasty before 65F 55M? No   Social History Narrative    Lives with  .   Likes to travel ,   Worked in  bank        Social Determinants of Health     Financial Resource Strain: Not on file   Food Insecurity: Not on file   Transportation Needs: Not on file   Physical Activity: Not on file   Stress: Not on file   Social Connections: Not on file   Interpersonal Safety: Not on file   Housing Stability: Not on file          Medications  Allergies   Current Outpatient Medications   Medication Sig Dispense Refill     apixaban ANTICOAGULANT (ELIQUIS ANTICOAGULANT) 5 MG tablet Take 1 tablet (5 mg) by mouth 2 times daily. 180 tablet 3     cholecalciferol, vitamin D3, (VITAMIN D3) 2,000 unit capsule Take 1,000 Units by mouth once a week       losartan (COZAAR) 25 MG tablet Take 0.5 tablets (12.5 mg) by mouth daily 30 tablet 0     metoprolol succinate ER (TOPROL XL) 25 MG 24 hr tablet Take 1 tablet (25 mg) by mouth daily. 30 tablet 6     aspirin 81 MG EC tablet Take 1 tablet (81 mg) by mouth daily (Patient not taking: Reported on 8/30/2024) 30 tablet 2      No Known Allergies      Lab Results     Chemistry/lipid CBC Cardiac Enzymes/BNP/TSH/INR   Recent Labs   Lab Test 08/09/24  1442 07/18/24  0716 07/17/24  0423   TRIG  --   --  73   LDL  --   --  67   BUN 22.9   < > 18.5      < > 147*   CO2 26   < > 27    < > = values in this interval not displayed.    Recent Labs   Lab Test 07/17/24 0423   WBC 10.2   HGB 13.1   HCT 39.6   MCV 89       Recent Labs   Lab Test 08/09/24  1442 07/17/24 0423   TSH 3.54 2.48   INR  --  1.06        A total of 61 minutes was spent reviewing patient's medical records, obtaining history and performing examination, as well as discussing diagnoses/ recommendations with patient and answering all questions.                        Thank you for allowing me to participate in the care of your patient.      Sincerely,     Zenaida Farooq MD     [facility]  cc:   Korey Lopez MD  [address] [place], [place] [id number]

## 2024-08-30 NOTE — PROGRESS NOTES
Thank you, Dr. Siomara Ott, for asking the [facility] team to see Ms. Marifer Temple to evaluate paroxysmal atrial flutter/fibrillation, recent CVA.    Assessment/Recommendations   Assessment:    1.  Paroxysmal atrial fib/flutter, documented on recent event monitor following CVA in July.  She does have tendency for rapid ventricular response when in atrial fibrillation/flutter but also has some tendency for borderline sinus bradycardia when in sinus rhythm.  This raises a question of sick sinus syndrome as the potential underlying cause.  Today, she is in A-fib with a ventricular rate of 106.  Recommended initiation of metoprolol succinate 25 mg daily to try to keep her heart rate slower and potentially allow her to convert back to sinus rhythm.  She was initiated on Eliquis anticoagulation by PCP 1 week ago.  Will recommend follow-up in our A-fib clinic in 1 to 2 weeks for consideration of cardioversion if she remains in atrial fibrillation.  2.  Nonsustained VT.  This was documented recently on the event monitor and raises the possibility of underlying coronary artery disease.  She denies any history of exertional chest discomfort or dyspnea but admits to not doing any formal exercise.  Advise pharmacologic nuclear stress study.  3.  Status post right precentral gyrus CVA.  Unclear whether this may have been due to hypertension or atrial dysrhythmia although no evidence of multiple foci of infarction.  There is mention of mild to moderate presumed chronic small vessel ischemic changes which may also been the cause.  4.  Hypertension, inadequately controlled.  Again we will add low-dose metoprolol and follow response.    Plan:  1.  Continue current medications and add metoprolol succinate 25 mg daily  2.  Follow-up in our A-fib clinic in 1 to 2 weeks  3.  Schedule pharmacologic nuclear stress test with further recommendations to follow       History of Present Illness    Ms. Marifer Temple  is a 74 year old female with past history of transient hypertension who presented to [facility] in mid July with acute onset of slurred speech and facial drooping.  Was found to have evidence of possible infarct in the left basal ganglia region on head CT although subsequent brain MRI demonstrated evidence of a right precentral gyrus infarct.  ECG demonstrated normal rhythm and no evidence of an atrial arrhythmia was identified over the next 48 hours.  Because her blood pressure was markedly elevated on presentation, she was started on low-dose losartan and discharged home on a combination of aspirin and Plavix.  Since her discharge, she has completed a 30-day event monitor which demonstrated 1 episode of nonsustained VT as well as 2 episodes of atrial fibrillation or flutter.  Was initiated on Eliquis anticoagulation last week and is here today for further evaluation.    Over the course the last 24 to 48 hours, she has noted symptoms of palpitations with elevated heart rates.  Blood pressures have been borderline elevated.  Denies any associated chest discomfort or shortness of breath.  No prior history of exertional chest discomfort or dyspnea although she admits to not doing any formal exercise.  Her sister has a history of atrial fibrillation and a permanent pacemaker.  She states her mother had a pacemaker and multiple TIAs but it is not known whether she had A-fib.    ECG (personally reviewed): ECG in the office today demonstrates atrial fibrillation with rapid ventricular response, rate 106.  Possible inferior and anterior infarct of unknown age.  Atrial fibrillation is new when compared to previous ECGs.    Cardiac Imaging Studies (personally reviewed):   Procedure  Complete Portable Echo Adult.  ______________________________________________________________________________  Interpretation Summary     1. Normal left ventricular size and systolic performance with a visually  estimated ejection  "fraction of 60-65%.  2. There is trace aortic insufficiency.  3. Normal right ventricular size and systolic performance.       Physical Examination Review of Systems   BP (!) 154/94 (BP Location: Left arm, Patient Position: Sitting, Cuff Size: Adult Regular)   Pulse 74   Resp 16   Ht 1.567 m (5' 1.7\")   Wt 57.6 kg (127 lb)   BMI 23.46 kg/m    Body mass index is 23.46 kg/m .  Wt Readings from Last 3 Encounters:   08/30/24 57.6 kg (127 lb)   08/09/24 62 kg (136 lb 9.6 oz)   07/17/24 55.2 kg (121 lb 11.1 oz)     General Appearance:   Awake, Alert, No acute distress.   HEENT:  No scleral icterus; the mucous membranes were pink and moist.   Neck: No cervical bruits or jugular venous distention    Chest: The spine was straight. The chest was symmetric.   Lungs:   Respirations unlabored; the lungs are clear to auscultation. No wheezing   Cardiovascular:   Irregularly irregular rhythm which is mildly rapid.  S1, S2 normal.  No murmur or gallop   Abdomen:  No organomegaly, masses, bruits, or tenderness. Bowels sounds are present   Extremities: No peripheral edema bilaterally   Skin: No xanthelasma. Warm, Dry.   Musculoskeletal: No tenderness.   Neurologic: Mood and affect are appropriate.    Enc Vitals  BP: (!) 154/94  Pulse: 74  Resp: 16  Weight: 57.6 kg (127 lb)  Height: 156.7 cm (5' 1.7\")                                         Medical History  Surgical History Family History Social History   Past Medical History:   Diagnosis Date    Atrial flutter (H)     Hypertension    -Atrial fibrillation Past Surgical History:   Procedure Laterality Date    OTHER SURGICAL HISTORY      bowel obstruction pediatric    OTHER SURGICAL HISTORY      s/p partial colectomy as a child    OTHER SURGICAL HISTORY      right carpal  tunnel release    Family History   Problem Relation Age of Onset    Cerebrovascular Disease Mother     Leukemia Mother     Hypertension Mother     Colon Cancer Father     Atrial fibrillation Sister     Rheumatoid " Arthritis Sister     Arrhythmia Sister     Breast Cancer Maternal Aunt     Social History     Socioeconomic History    Marital status:      Spouse name: Not on file    Number of children: Not on file    Years of education: Not on file    Highest education level: Not on file   Occupational History    Not on file   Tobacco Use    Smoking status: Never    Smokeless tobacco: Never   Vaping Use    Vaping status: Never Used   Substance and Sexual Activity    Alcohol use: Not on file    Drug use: Not on file    Sexual activity: Not on file   Other Topics Concern    Parent/sibling w/ CABG, MI or angioplasty before 65F 55M? No   Social History Narrative    Lives with  .   Likes to travel ,   Worked in  bank        Social Determinants of Health     Financial Resource Strain: Not on file   Food Insecurity: Not on file   Transportation Needs: Not on file   Physical Activity: Not on file   Stress: Not on file   Social Connections: Not on file   Interpersonal Safety: Not on file   Housing Stability: Not on file          Medications  Allergies   Current Outpatient Medications   Medication Sig Dispense Refill    apixaban ANTICOAGULANT (ELIQUIS ANTICOAGULANT) 5 MG tablet Take 1 tablet (5 mg) by mouth 2 times daily. 180 tablet 3    cholecalciferol, vitamin D3, (VITAMIN D3) 2,000 unit capsule Take 1,000 Units by mouth once a week      losartan (COZAAR) 25 MG tablet Take 0.5 tablets (12.5 mg) by mouth daily 30 tablet 0    metoprolol succinate ER (TOPROL XL) 25 MG 24 hr tablet Take 1 tablet (25 mg) by mouth daily. 30 tablet 6    aspirin 81 MG EC tablet Take 1 tablet (81 mg) by mouth daily (Patient not taking: Reported on 8/30/2024) 30 tablet 2      No Known Allergies      Lab Results    Chemistry/lipid CBC Cardiac Enzymes/BNP/TSH/INR   Recent Labs   Lab Test 08/09/24  1442 07/18/24  0716 07/17/24  0423   TRIG  --   --  73   LDL  --   --  67   BUN 22.9   < > 18.5      < > 147*   CO2 26   < > 27    < > = values in this  interval not displayed.    Recent Labs   Lab Test 07/17/24  0423   WBC 10.2   HGB 13.1   HCT 39.6   MCV 89       Recent Labs   Lab Test 08/09/24  1442 07/17/24  0423   TSH 3.54 2.48   INR  --  1.06        A total of 61 minutes was spent reviewing patient's medical records, obtaining history and performing examination, as well as discussing diagnoses/ recommendations with patient and answering all questions.

## 2024-08-30 NOTE — PATIENT INSTRUCTIONS
Continue current medications  Add metoprolol succinate 25 mg daily to slow heart rates down  Set up nuclear stress test to rule out any blood flow issues to the heart  Follow up in our A fib clinic regarding further management

## 2024-08-31 LAB
ATRIAL RATE - MUSE: NORMAL BPM
DIASTOLIC BLOOD PRESSURE - MUSE: NORMAL MMHG
INTERPRETATION ECG - MUSE: NORMAL
P AXIS - MUSE: NORMAL DEGREES
PR INTERVAL - MUSE: NORMAL MS
QRS DURATION - MUSE: 56 MS
QT - MUSE: 338 MS
QTC - MUSE: 448 MS
R AXIS - MUSE: -20 DEGREES
SYSTOLIC BLOOD PRESSURE - MUSE: NORMAL MMHG
T AXIS - MUSE: 9 DEGREES
VENTRICULAR RATE- MUSE: 106 BPM

## 2024-09-05 ENCOUNTER — HOSPITAL ENCOUNTER (OUTPATIENT)
Dept: NUCLEAR MEDICINE | Facility: HOSPITAL | Age: 74
Discharge: HOME | End: 2024-09-05
Attending: INTERNAL MEDICINE
Payer: COMMERCIAL

## 2024-09-05 ENCOUNTER — HOSPITAL ENCOUNTER (OUTPATIENT)
Dept: CARDIOLOGY | Facility: HOSPITAL | Age: 74
Discharge: HOME | End: 2024-09-05
Attending: INTERNAL MEDICINE
Payer: COMMERCIAL

## 2024-09-05 DIAGNOSIS — I47.29 NSVT (NONSUSTAINED VENTRICULAR TACHYCARDIA) (H): ICD-10-CM

## 2024-09-05 DIAGNOSIS — I48.0 PAROXYSMAL ATRIAL FIBRILLATION (H): ICD-10-CM

## 2024-09-05 LAB
CV STRESS CURRENT BP HE: NORMAL
CV STRESS CURRENT HR HE: 102
CV STRESS CURRENT HR HE: 106
CV STRESS CURRENT HR HE: 107
CV STRESS CURRENT HR HE: 107
CV STRESS CURRENT HR HE: 108
CV STRESS CURRENT HR HE: 109
CV STRESS CURRENT HR HE: 112
CV STRESS CURRENT HR HE: 112
CV STRESS CURRENT HR HE: 116
CV STRESS CURRENT HR HE: 118
CV STRESS CURRENT HR HE: 118
CV STRESS CURRENT HR HE: 119
CV STRESS CURRENT HR HE: 119
CV STRESS CURRENT HR HE: 122
CV STRESS CURRENT HR HE: 122
CV STRESS CURRENT HR HE: 124
CV STRESS CURRENT HR HE: 126
CV STRESS CURRENT HR HE: 128
CV STRESS CURRENT HR HE: 136
CV STRESS CURRENT HR HE: 99
CV STRESS DEVIATION TIME HE: NORMAL
CV STRESS ECHO PERCENT HR HE: NORMAL
CV STRESS EXERCISE STAGE HE: NORMAL
CV STRESS FINAL RESTING BP HE: NORMAL
CV STRESS FINAL RESTING HR HE: 112
CV STRESS MAX HR HE: 140
CV STRESS MAX TREADMILL GRADE HE: 0
CV STRESS MAX TREADMILL SPEED HE: 0
CV STRESS PEAK DIA BP HE: NORMAL
CV STRESS PEAK SYS BP HE: NORMAL
CV STRESS PHASE HE: NORMAL
CV STRESS PROTOCOL HE: NORMAL
CV STRESS RESTING PT POSITION HE: NORMAL
CV STRESS ST DEVIATION AMOUNT HE: NORMAL
CV STRESS ST DEVIATION ELEVATION HE: NORMAL
CV STRESS ST EVELATION AMOUNT HE: NORMAL
CV STRESS TEST TYPE HE: NORMAL
CV STRESS TOTAL STAGE TIME MIN 1 HE: NORMAL
NUC STRESS EJECTION FRACTION: 68 %
RATE PRESSURE PRODUCT: NORMAL
STRESS ECHO BASELINE DIASTOLIC HE: 93
STRESS ECHO BASELINE HR: 103
STRESS ECHO BASELINE SYSTOLIC BP: 169
STRESS ECHO CALCULATED PERCENT HR: 96 %
STRESS ECHO LAST STRESS DIASTOLIC BP: 78
STRESS ECHO LAST STRESS HR: 107
STRESS ECHO LAST STRESS SYSTOLIC BP: 150
STRESS ECHO TARGET HR: 146
STRESS/REST PERFUSION RATIO: 1.32

## 2024-09-05 PROCEDURE — A9500 TC99M SESTAMIBI: HCPCS | Performed by: INTERNAL MEDICINE

## 2024-09-05 PROCEDURE — 343N000001 HC RX 343: Performed by: INTERNAL MEDICINE

## 2024-09-05 PROCEDURE — 250N000011 HC RX IP 250 OP 636: Performed by: INTERNAL MEDICINE

## 2024-09-05 PROCEDURE — 78452 HT MUSCLE IMAGE SPECT MULT: CPT | Mod: 26 | Performed by: INTERNAL MEDICINE

## 2024-09-05 PROCEDURE — 93017 CV STRESS TEST TRACING ONLY: CPT

## 2024-09-05 PROCEDURE — 78452 HT MUSCLE IMAGE SPECT MULT: CPT

## 2024-09-05 PROCEDURE — 93016 CV STRESS TEST SUPVJ ONLY: CPT | Performed by: INTERNAL MEDICINE

## 2024-09-05 PROCEDURE — 93018 CV STRESS TEST I&R ONLY: CPT | Performed by: INTERNAL MEDICINE

## 2024-09-05 RX ORDER — METOPROLOL SUCCINATE 25 MG/1
25 TABLET, EXTENDED RELEASE ORAL DAILY
Qty: 90 TABLET | Refills: 1 | Status: SHIPPED | OUTPATIENT
Start: 2024-09-05 | End: 2024-09-09

## 2024-09-05 RX ORDER — AMINOPHYLLINE 25 MG/ML
50-100 INJECTION, SOLUTION INTRAVENOUS
Status: COMPLETED | OUTPATIENT
Start: 2024-09-05 | End: 2024-09-05

## 2024-09-05 RX ORDER — REGADENOSON 0.08 MG/ML
0.4 INJECTION, SOLUTION INTRAVENOUS ONCE
Status: COMPLETED | OUTPATIENT
Start: 2024-09-05 | End: 2024-09-05

## 2024-09-05 RX ADMIN — AMINOPHYLLINE 50 MG: 25 INJECTION, SOLUTION INTRAVENOUS at 08:51

## 2024-09-05 RX ADMIN — REGADENOSON 0.4 MG: 0.08 INJECTION, SOLUTION INTRAVENOUS at 08:40

## 2024-09-05 RX ADMIN — Medication 8.4 MILLICURIE: at 07:25

## 2024-09-05 RX ADMIN — Medication 31.2 MILLICURIE: at 08:40

## 2024-09-09 DIAGNOSIS — I48.0 PAROXYSMAL ATRIAL FIBRILLATION (H): ICD-10-CM

## 2024-09-09 RX ORDER — METOPROLOL SUCCINATE 50 MG/1
50 TABLET, EXTENDED RELEASE ORAL DAILY
Qty: 90 TABLET | Refills: 1 | Status: SHIPPED | OUTPATIENT
Start: 2024-09-09

## 2024-09-10 ENCOUNTER — OFFICE VISIT (OUTPATIENT)
Dept: CARDIOLOGY | Facility: CLINIC | Age: 74
End: 2024-09-10
Attending: INTERNAL MEDICINE
Payer: COMMERCIAL

## 2024-09-10 VITALS
HEART RATE: 83 BPM | RESPIRATION RATE: 18 BRPM | OXYGEN SATURATION: 98 % | SYSTOLIC BLOOD PRESSURE: 142 MMHG | DIASTOLIC BLOOD PRESSURE: 80 MMHG | WEIGHT: 133 LBS | HEIGHT: 62 IN | BODY MASS INDEX: 24.48 KG/M2

## 2024-09-10 DIAGNOSIS — I10 ESSENTIAL HYPERTENSION: ICD-10-CM

## 2024-09-10 DIAGNOSIS — I48.0 PAROXYSMAL ATRIAL FIBRILLATION (H): Primary | ICD-10-CM

## 2024-09-10 PROCEDURE — 99204 OFFICE O/P NEW MOD 45 MIN: CPT | Performed by: NURSE PRACTITIONER

## 2024-09-10 PROCEDURE — G2211 COMPLEX E/M VISIT ADD ON: HCPCS | Performed by: NURSE PRACTITIONER

## 2024-09-10 RX ORDER — LOSARTAN POTASSIUM 25 MG/1
12.5 TABLET ORAL DAILY
Qty: 45 TABLET | Refills: 1 | Status: SHIPPED | OUTPATIENT
Start: 2024-09-10

## 2024-09-10 NOTE — LETTER
9/10/2024    Siomara Ott MD  [address] [facility] [address]    RE: Marifer Temple       Dear Colleague,     I had the pleasure of seeing Marifer Temple in the [facility].    HEART CARE ELECTROPHYSIOLOGY CONSULTATON NOTE      [facility]  [phone]    Thank you, Dr. Farooq, for asking the [facility] Care Electrophysiology team to see Ms. Marifer Temple to evaluate atrial fibrillation.    Assessment/Recommendations   Assessment/Plan:  1.  Paroxysmal Atrial Fibrillation: Newly diagnosed.  Mildly symptomatic.  Continues to have frequent, though shorter episodes of AF since starting metoprolol.  We had a lengthy discussion of the physiology and natural progression of atrial fibrillation and treatment options including rate control versus rhythm control with medications or pulmonary vein isolation ablation.  She was given information to review at home.  -- Continue metoprolol XL 50 mg daily  -- Zio patch x 14 days to further quantify AF and symptom correlation    She was reassured that atrial fibrillation is not life-threatening, but carries an increased risk for stroke.  She has a [id number] score of 5 for age 65-74, female gender, HTN, CVA.    -- Continue Eliquis 5 mg twice daily    2.  Nonsustained ventricular tachycardia: Very short run of NSVT seen on MCT.  Echo and nuclear stress test unremarkable, thus considered very low risk.  No further intervention recommended at this time.    3.  Hypertension: Fairly well-controlled per home readings.  Should improve with recent increase in metoprolol.    -- Continue losartan 12.5 mg daily  -- Continue metoprolol XL 50 mg daily as above    Follow up in 6 weeks     History of Present Illness/Subjective    HPI: Marifer Temple is a 74 year old female who comes in today for EP consultation of atrial fibrillation.  She has a history of paroxysmal atrial fibrillation, NSVT, hypertension, stage III CKD, CVA  (7/16/2024,s/p tPA). MRI brain showed small, late acute vs subacute infarct of the right precentral gyrus and subcortical white matter; small old lacunar infarct of the posterior limb of the right internal capsule/dorsal lentiform nucleus; mild to mdoerate chronic small vessel changes.  No evidence of A-fib during hospitalization, but subsequently seen on MCT.  Unclear if CVA due to hypertension/small vessel disease or atrial arrhythmias.  Her  Glenn also has A-fib and is a patient of mine as well.    AF Arrhythmia history  Dx/date: PAF 7/2024 documented on MCT  Sx: Palpitations, fluttering in the base of her throat, dyspnea with more strenuous exertion such as stairs  [id number] score: 5 for age 65-74, female gender, HTN, CVA  Oral anticoagulation: Eliquis 5 mg twice daily  Antiarrhythmic medications, AV acosta blocking agents: Metoprolol XL  Procedures  DCCV: None  Ablation: None    Marifer states that she feels well and remains very active.  She just started on the higher dose of metoprolol yesterday, but feels less A-fib.  She continues to have daily episodes, but now are brief lasting a few minutes.  Episodes occur in the evening, though she is in A-fib now associated with mild awareness of it.  She denies chest discomfort, abdominal fullness/bloating or peripheral edema, shortness of breath, paroxysmal nocturnal dyspnea, orthopnea, lightheadedness, dizziness, pre-syncope, or syncope.    Cardiographics (EKG personally reviewed):  EKG done 8/30/2024 shows atrial fibrillation with rapid ventricular response at 106 bpm  EKG done 7/16/2024 shows sinus rhythm at 71 bpm, QRS 76 ms, QT/QTc 418/454 ms    Cardiac event monitoring from 7/18/2024 to 8/16/2024 (monitored duration 28d 21h 22m).  Baseline rhythm was sinus rhythm 80bpm.    Reported heart rate range 50 to 172bpm, average 71bpm.  1 symptom trigger correlated to atrial fibrillation/atrial flutter with ventricular rates up to 172bpm.  1 episode of  "nonsustained ventricular tachycardia - 8 beats, 156bpm.  There were no pauses of over 3.0s.  Atrial fibrillation/flutter are incompletely characterized on this monitoring modality.  Supraventricular and ventricular ectopic beat frequency are not reported on this monitoring modality.     ECHO done 7/17/2024:  1. Normal left ventricular size and systolic performance with a visually  estimated ejection fraction of 60-65%.  2. There is trace aortic insufficiency.  3. Normal right ventricular size and systolic performance.    NM stress test done 9/5/2024:     Lexiscan stress ECG negative for ischemia.     The nuclear stress test is negative for inducible myocardial ischemia or infarction.     Stress to rest cavity ratio is 1.32.  Significance of this finding is unknown but may be related to atrial fibrillation with RVR given normal perfusion on imaging.     The left ventricular ejection fraction at stress is 68%.     There is no prior study for comparison.  Baseline electrocardiogram demonstrates atrial fibrillation.     I have reviewed and updated the patient's Past Medical History, Social History, Family History and Medication List.     Physical Examination  Review of Systems   Vitals: BP (!) 142/80 (BP Location: Left arm, Patient Position: Sitting, Cuff Size: Adult Regular)   Pulse 83   Resp 18   Ht 1.562 m (5' 1.5\")   Wt 60.3 kg (133 lb)   SpO2 98%   BMI 24.72 kg/m    BMI= Body mass index is 24.72 kg/m .  Wt Readings from Last 3 Encounters:   09/10/24 60.3 kg (133 lb)   08/30/24 57.6 kg (127 lb)   08/09/24 62 kg (136 lb 9.6 oz)       General Appearance:   Alert, well-appearing and in no acute distress.   HEENT: Atraumatic, normocephalic.  No scleral icterus, normal conjunctivae, EOMs intact, PERRL.  Mucous membranes pink and moist.     Chest/Lungs:   Chest symmetric, spine straight.  Respirations unlabored.  Lungs are clear to auscultation.   Cardiovascular:   Irregular rate and rhythm.  Normal first and second " heart sounds with no murmurs, rubs, or gallops; radial pulses are intact, No edema.   Abdomen:  Soft, nondistended, bowel sounds present.   Extremities: No cyanosis or clubbing.   Musculoskeletal: Moves all extremities.     Skin: Warm, dry, intact.    Neurologic: Mood and affect are appropriate.  Alert and oriented to person, place, time, and situation.     ROS: 10 point ROS neg other than the symptoms noted above in the HPI.        Medical History  Surgical History Family History Social History   Past Medical History:   Diagnosis Date     Atrial flutter (H)      Carpal tunnel syndrome of right wrist 01/14/2019     Cerebrovascular accident (CVA), unspecified mechanism (H) 07/16/2024     Hypertension      Paroxysmal atrial fibrillation (H) 09/10/2024     Past Surgical History:   Procedure Laterality Date     OTHER SURGICAL HISTORY      bowel obstruction pediatric     OTHER SURGICAL HISTORY      s/p partial colectomy as a child     OTHER SURGICAL HISTORY      right carpal  tunnel release     Family History   Problem Relation Age of Onset     Cerebrovascular Disease Mother      Leukemia Mother      Hypertension Mother      Colon Cancer Father      Atrial fibrillation Sister      Rheumatoid Arthritis Sister      Arrhythmia Sister      Breast Cancer Maternal Aunt         Social History     Socioeconomic History     Marital status:      Spouse name: Not on file     Number of children: Not on file     Years of education: Not on file     Highest education level: Not on file   Occupational History     Not on file   Tobacco Use     Smoking status: Never     Smokeless tobacco: Never   Vaping Use     Vaping status: Never Used   Substance and Sexual Activity     Alcohol use: Not on file     Drug use: Never     Sexual activity: Not on file   Other Topics Concern     Parent/sibling w/ CABG, MI or angioplasty before 65F 55M? No   Social History Narrative    Lives with  .   Likes to travel ,   Worked in  bank     "    Social Determinants of Health     Financial Resource Strain: Not on file   Food Insecurity: Not on file   Transportation Needs: Not on file   Physical Activity: Not on file   Stress: Not on file   Social Connections: Not on file   Interpersonal Safety: Not on file   Housing Stability: Not on file           Medications  Allergies   Current Outpatient Medications   Medication Sig Dispense Refill     apixaban ANTICOAGULANT (ELIQUIS ANTICOAGULANT) 5 MG tablet Take 1 tablet (5 mg) by mouth 2 times daily. 180 tablet 3     cholecalciferol, vitamin D3, (VITAMIN D3) 2,000 unit capsule Take 1,000 Units by mouth daily.       losartan (COZAAR) 25 MG tablet Take 0.5 tablets (12.5 mg) by mouth daily. 45 tablet 1     metoprolol succinate ER (TOPROL XL) 50 MG 24 hr tablet Take 1 tablet (50 mg) by mouth daily. 90 tablet 1     No Known Allergies       Lab Results    Chemistry/lipid CBC Cardiac Enzymes/BNP/TSH/INR   Recent Labs   Lab Test 07/17/24  0423   CHOL 160   HDL 78   LDL 67   TRIG 73     Recent Labs   Lab Test 07/17/24  0423 05/07/21  1146 01/14/19  1059   LDL 67 94 98     Recent Labs   Lab Test 08/09/24  1442      POTASSIUM 4.4   CHLORIDE 106   CO2 26   GLC 90   BUN 22.9   CR 1.20*   GFRESTIMATED 47*   PHILLY 9.4     Recent Labs   Lab Test 08/09/24  1442 07/18/24  0716 07/17/24  0423   CR 1.20* 1.09* 0.99*     Recent Labs   Lab Test 07/17/24  0423   A1C 5.1      Recent Labs   Lab Test 07/17/24  0423   WBC 10.2   HGB 13.1   HCT 39.6   MCV 89        Recent Labs   Lab Test 07/17/24  0423 07/16/24  1734 05/07/21  1146   HGB 13.1 13.4 14.2    No results for input(s): \"TROPONINI\" in the last 45747 hours.  No results for input(s): \"BNP\", \"NTBNPI\", \"NTBNP\" in the last 19673 hours.  Recent Labs   Lab Test 08/09/24  1442   TSH 3.54     Recent Labs   Lab Test 07/17/24  0423 07/16/24  1734   INR 1.06 1.03      50 minutes were spent on the date of encounter performing chart review, history and exam, documentation, and " further activities as noted above.    The longitudinal plan of care for the diagnosis(es)/condition(s) as documented were addressed during this visit. Due to the added complexity in care, I will continue to support Marifer in the subsequent management and with ongoing continuity of care.                                           Thank you for allowing me to participate in the care of your patient.      Sincerely,     CURTIS [facility]mer [facility]  cc:   Zenaida Farooq MD  [address], [address]  [place] [id number]

## 2024-09-10 NOTE — PATIENT INSTRUCTIONS
[facility]  Cardiac Electrophysiology  [address]  [place] [address]   Office: [phone]  Fax: [phone]       Marifer Temple,    It was a pleasure to see you today at the [facility].     My recommendations after this visit include:    Continue current medications    Wear Zio monitor x 2 weeks to quantify A fib.  Monitor will be mailed to you.    Consider getting a personal ECG device such as Aditazzdia to monitor rhythm    Call if your A fib is increasing in frequency or duration    Follow up in 6 weeks    Cleopatra Coelho CNP  [facility], Electrophysiology  [phone]  EP nurses [phone]           ATRIAL FIBRILLATION: Patient Information     What is atrial fibrillation?  Atrial fibrillation (AF, A-fib) is a common heart rhythm problem (arrhythmia) occurring within the upper chambers of the heart (the atria).  In normal rhythm, the upper and lower chambers of the heart are electrically driven to contract in a coordinated sequence.  In atrial fibrillation, the atria lose their ability to contract because of rapid and chaotic electrical activity.  The lower chambers of the heart (the ventricles) continue to pump blood throughout the body, though with irregular and often faster rate due to the chaotic activity within the atria.          How do I know if I have atrial fibrillation?   Some people may feel their heart beating faster, harder, or irregularly while in atrial fibrillation.  Others may be lightheaded, fatigued, feel weak or tired or become more short of breath especially with activities.  Some patients have no symptoms at all.  Atrial fibrillation may be found due to an irregular pulse or on an electrocardiogram (ECG). Atrial fibrillation can start and stop on its own, and episodes can last from seconds to several months.       How common is atrial fibrillation?   An estimated 3-6 million people in the [place]  have atrial fibrillation.  Atrial fibrillation is a common heart rhythm problem for older persons, affecting as estimated 12-15% of people over the age of 65 years of age.     What causes atrial fibrillation?   Age is the most important risk factor for atrial fibrillation.  Atrial fibrillation is more common in people with other heart disease, high blood pressure, diabetes, obesity, sleep apnea and in people who regularly consume alcohol.  Surgery, lung disease, or thyroid problems can lead to atrial fibrillation.  Atrial fibrillation has multiple possible causes, and in most cases a single cause cannot be found.  Atrial fibrillation is a progressive condition, usually starting with at an early stage with short and infrequent episodes.  In later stages of disease, more frequent and longer lasting episodes of atrial fibrillation occur, ultimately culminating in episodes which do not spontaneously terminate.  Generally, more enlargement and scarring within the upper chambers of the heart is observed as atrial fibrillation progresses from early to late-stage disease.     How is atrial fibrillation diagnosed and evaluated?    Because of its start-stop nature, atrial fibrillation can be challenging to diagnose.  Atrial fibrillation is most commonly diagnosed via cardiac rhythm recordings - either an ECG or wearable cardiac rhythm monitor.  For patients with pacemakers, defibrillators or implantable loop recorders, atrial fibrillation may be recorded via these devices.  Recently, commercially available devices (eg. Apple Watch, MySocialNightlife device, certain FitBit devices, others) can allow patients to take 30 second cardiac rhythm recordings which may document atrial fibrillation.  Once atrial fibrillation is diagnosed, additional tests include blood tests and an echocardiogram.  The echocardiogram uses ultrasound to look at your heart to assess your cardiac function and evaluate for other heart disease.  Additional evaluation  may include CT or MRI studies.     Is atrial fibrillation dangerous?   Atrial fibrillation is not usually a life-threatening arrhythmia.  The most serious consequences of atrial fibrillation including stroke and worsening of overall cardiac function.  While in atrial fibrillation, the upper cardiac chambers do not contract normally, resulting in slower blood flow and increased risk of clot formation.  If this blood clot becomes detached from the heart a stroke can occur.  Unfortunately, stroke can be the first sign of atrial fibrillation for some people.  With a stroke, you may notice abnormal sensation, weakness on one side of the body or face, changes in your vision or speech.  If you have any of these signs, you should contact EMS and be evaluated in an emergency room as soon as possible.       How is atrial fibrillation treated?     Several treatment options exist for suppressing atrial fibrillation - however, it is not an easily curable arrhythmia.  The first goal in managing atrial fibrillation is to minimize stroke risk.  The second goal is to improve symptoms associated with atrial fibrillation.  Finally, in patients with reduced cardiac function, maintaining normal rhythm can help improve cardiac function.       Blood thinners are used to reduce the risk of stroke in patients with high estimated stroke risk related to atrial fibrillation.  For patients at higher risk of bleeding related to blood thinner use, implantable devices may be an option to reduce stroke risk without the need for long term blood thinner use.       Atrial fibrillation can be managed via two strategies: rate control and rhythm control.  In a rate control strategy, continued atrial fibrillation is accepted and medications (eg. beta-blockers or calcium channel blockers) are used to control the lower chamber rate.  In a rhythm control strategy, anti-arrhythmic medications or catheter ablation are used to maintain normal cardiac rhythm and  slow disease progression by suppressing atrial fibrillation.  A procedure called a cardioversion, in which an electric shock is delivered through patches placed on the chest wall while under deep sedation, can be performed to temporarily restore normal cardiac rhythm, though does not address the chance of atrial fibrillation recurrence.  Treatments are more effective for earlier rather than later stage atrial fibrillation.  Lifestyle modifications (maintaining a healthy weight, aerobic exercise, diagnosing and treating sleep apnea, and minimizing alcohol intake) are important elements of atrial fibrillation rhythm control.      What is catheter ablation for atrial fibrillation?  Cardiac catheter ablation is a commonly performed, minimally invasive procedure performed by a cardiac electrophysiologist to treat many different cardiac rhythm abnormalities.  During catheter ablation, long, thin, flexible tubes are advanced into the heart via small sheaths inserted into the femoral veins and thermal energy (either heating or cooling) is applied within the heart to disrupt abnormal electrical activity.  Atrial fibrillation ablation is performed under general anesthesia, with procedures generally taking approximately 2-3 hours.  Patients are typically observed for 3-5 hours after the ablation, and in most cases can be discharged home the same day.  Atrial fibrillation ablation is associated with better outcomes (mortality, cardiovascular hospitalizations, atrial arrhythmia recurrences) compared to antiarrhythmic drug therapy.  However, atrial fibrillation recurrences are not uncommon, and repeat catheter ablation may be offered.  Your electrophysiology team can review atrial fibrillation ablation, anticipated success rates, risks, and recovery expectations with you.     What are anti-arrhythmic medications?  Anti-arrhythmic medications are specialized drugs which alter cardiac electrical functioning to suppress arrhythmias.   There are several anti-arrhythmic medications available, each with its own success rate and side effects.  Some anti-arrhythmic medications are less effective though safer to use, others are more effective though have serious potential toxicities.  Atrial fibrillation recurrences are common and may require dose adjustment or change in antiarrhythmic therapy.  Your electrophysiology team will carefully consider which medication would be the best and safest for your particular case.       Can I live a normal life?    The goal of atrial fibrillation management is for patients to live normal lives without being limited by symptoms related to atrial fibrillation.     Are any additional educational resources available?  There are a number of excellent atrial fibrillation education resources available to you online.  A few options you may wish to review include:  hrsonline.org/guide-atrial-fibrillation  afibmatters.org  getsmartaboutafib.com  stopaf.com     What comes next?    Consider your management options and let us know how we can help in your decision process.  Please take medications as they have been prescribed.  You should also get any tests that may have been ordered for you.       When to Call Your Doctor or seek emergency care:  Call your doctor or seek emergency care if you have any significant changes with the following:  Weakness  Dizziness  Fainting  Fatigue  Shortness of breath  Chest pain with increased activity  If you are concerned that your heart rate is too fast or too slow  Bleeding that does not stop in 10 minutes  Coughing or throwing up blood  Bloody diarrhea or bleeding hemorrhoids  Dark-colored urine or black stool  Allergic reactions:  Rash  Itching  Swelling  Trouble breathing or swallowing        Please call the [facility] at [phone] if you have concerns about your symptoms, your medicines, or your follow-up appointments.

## 2024-09-10 NOTE — PROGRESS NOTES
HEART CARE ELECTROPHYSIOLOGY CONSULTATON NOTE      [facility]  [phone]    Thank you, Dr. Farooq, for asking the [facility] Heart Care Electrophysiology team to see Ms. Marifer Temple to evaluate atrial fibrillation.    Assessment/Recommendations   Assessment/Plan:  1.  Paroxysmal Atrial Fibrillation: Newly diagnosed.  Mildly symptomatic.  Continues to have frequent, though shorter episodes of AF since starting metoprolol.  We had a lengthy discussion of the physiology and natural progression of atrial fibrillation and treatment options including rate control versus rhythm control with medications or pulmonary vein isolation ablation.  She was given information to review at home.  -- Continue metoprolol XL 50 mg daily  -- Zio patch x 14 days to further quantify AF and symptom correlation    She was reassured that atrial fibrillation is not life-threatening, but carries an increased risk for stroke.  She has a [id number] score of 5 for age 65-74, female gender, HTN, CVA.    -- Continue Eliquis 5 mg twice daily    2.  Nonsustained ventricular tachycardia: Very short run of NSVT seen on MCT.  Echo and nuclear stress test unremarkable, thus considered very low risk.  No further intervention recommended at this time.    3.  Hypertension: Fairly well-controlled per home readings.  Should improve with recent increase in metoprolol.    -- Continue losartan 12.5 mg daily  -- Continue metoprolol XL 50 mg daily as above    Follow up in 6 weeks     History of Present Illness/Subjective    HPI: Marifer Temple is a 74 year old female who comes in today for EP consultation of atrial fibrillation.  She has a history of paroxysmal atrial fibrillation, NSVT, hypertension, stage III CKD, CVA (7/16/2024,s/p tPA). MRI brain showed small, late acute vs subacute infarct of the right precentral gyrus and subcortical white matter; small old lacunar infarct of the posterior limb of the right internal  capsule/dorsal lentiform nucleus; mild to mdoerate chronic small vessel changes.  No evidence of A-fib during hospitalization, but subsequently seen on MCT.  Unclear if CVA due to hypertension/small vessel disease or atrial arrhythmias.  Her  Glenn also has A-fib and is a patient of mine as well.    AF Arrhythmia history  Dx/date: PAF 7/2024 documented on MCT  Sx: Palpitations, fluttering in the base of her throat, dyspnea with more strenuous exertion such as stairs  [id number] score: 5 for age 65-74, female gender, HTN, CVA  Oral anticoagulation: Eliquis 5 mg twice daily  Antiarrhythmic medications, AV acosta blocking agents: Metoprolol XL  Procedures  DCCV: None  Ablation: None    Marifer states that she feels well and remains very active.  She just started on the higher dose of metoprolol yesterday, but feels less A-fib.  She continues to have daily episodes, but now are brief lasting a few minutes.  Episodes occur in the evening, though she is in A-fib now associated with mild awareness of it.  She denies chest discomfort, abdominal fullness/bloating or peripheral edema, shortness of breath, paroxysmal nocturnal dyspnea, orthopnea, lightheadedness, dizziness, pre-syncope, or syncope.    Cardiographics (EKG personally reviewed):  EKG done 8/30/2024 shows atrial fibrillation with rapid ventricular response at 106 bpm  EKG done 7/16/2024 shows sinus rhythm at 71 bpm, QRS 76 ms, QT/QTc 418/454 ms    Cardiac event monitoring from 7/18/2024 to 8/16/2024 (monitored duration 28d 21h 22m).  Baseline rhythm was sinus rhythm 80bpm.    Reported heart rate range 50 to 172bpm, average 71bpm.  1 symptom trigger correlated to atrial fibrillation/atrial flutter with ventricular rates up to 172bpm.  1 episode of nonsustained ventricular tachycardia - 8 beats, 156bpm.  There were no pauses of over 3.0s.  Atrial fibrillation/flutter are incompletely characterized on this monitoring modality.  Supraventricular and ventricular  "ectopic beat frequency are not reported on this monitoring modality.     ECHO done 7/17/2024:  1. Normal left ventricular size and systolic performance with a visually  estimated ejection fraction of 60-65%.  2. There is trace aortic insufficiency.  3. Normal right ventricular size and systolic performance.    NM stress test done 9/5/2024:    Lexiscan stress ECG negative for ischemia.    The nuclear stress test is negative for inducible myocardial ischemia or infarction.    Stress to rest cavity ratio is 1.32.  Significance of this finding is unknown but may be related to atrial fibrillation with RVR given normal perfusion on imaging.    The left ventricular ejection fraction at stress is 68%.    There is no prior study for comparison.  Baseline electrocardiogram demonstrates atrial fibrillation.     I have reviewed and updated the patient's Past Medical History, Social History, Family History and Medication List.     Physical Examination  Review of Systems   Vitals: BP (!) 142/80 (BP Location: Left arm, Patient Position: Sitting, Cuff Size: Adult Regular)   Pulse 83   Resp 18   Ht 1.562 m (5' 1.5\")   Wt 60.3 kg (133 lb)   SpO2 98%   BMI 24.72 kg/m    BMI= Body mass index is 24.72 kg/m .  Wt Readings from Last 3 Encounters:   09/10/24 60.3 kg (133 lb)   08/30/24 57.6 kg (127 lb)   08/09/24 62 kg (136 lb 9.6 oz)       General Appearance:   Alert, well-appearing and in no acute distress.   HEENT: Atraumatic, normocephalic.  No scleral icterus, normal conjunctivae, EOMs intact, PERRL.  Mucous membranes pink and moist.     Chest/Lungs:   Chest symmetric, spine straight.  Respirations unlabored.  Lungs are clear to auscultation.   Cardiovascular:   Irregular rate and rhythm.  Normal first and second heart sounds with no murmurs, rubs, or gallops; radial pulses are intact, No edema.   Abdomen:  Soft, nondistended, bowel sounds present.   Extremities: No cyanosis or clubbing.   Musculoskeletal: Moves all extremities. "     Skin: Warm, dry, intact.    Neurologic: Mood and affect are appropriate.  Alert and oriented to person, place, time, and situation.     ROS: 10 point ROS neg other than the symptoms noted above in the HPI.        Medical History  Surgical History Family History Social History   Past Medical History:   Diagnosis Date    Atrial flutter (H)     Carpal tunnel syndrome of right wrist 01/14/2019    Cerebrovascular accident (CVA), unspecified mechanism (H) 07/16/2024    Hypertension     Paroxysmal atrial fibrillation (H) 09/10/2024     Past Surgical History:   Procedure Laterality Date    OTHER SURGICAL HISTORY      bowel obstruction pediatric    OTHER SURGICAL HISTORY      s/p partial colectomy as a child    OTHER SURGICAL HISTORY      right carpal  tunnel release     Family History   Problem Relation Age of Onset    Cerebrovascular Disease Mother     Leukemia Mother     Hypertension Mother     Colon Cancer Father     Atrial fibrillation Sister     Rheumatoid Arthritis Sister     Arrhythmia Sister     Breast Cancer Maternal Aunt         Social History     Socioeconomic History    Marital status:      Spouse name: Not on file    Number of children: Not on file    Years of education: Not on file    Highest education level: Not on file   Occupational History    Not on file   Tobacco Use    Smoking status: Never    Smokeless tobacco: Never   Vaping Use    Vaping status: Never Used   Substance and Sexual Activity    Alcohol use: Not on file    Drug use: Never    Sexual activity: Not on file   Other Topics Concern    Parent/sibling w/ CABG, MI or angioplasty before 65F 55M? No   Social History Narrative    Lives with  .   Likes to travel ,   Worked in  bank        Social Determinants of Health     Financial Resource Strain: Not on file   Food Insecurity: Not on file   Transportation Needs: Not on file   Physical Activity: Not on file   Stress: Not on file   Social Connections: Not on file   Interpersonal  "Safety: Not on file   Housing Stability: Not on file           Medications  Allergies   Current Outpatient Medications   Medication Sig Dispense Refill    apixaban ANTICOAGULANT (ELIQUIS ANTICOAGULANT) 5 MG tablet Take 1 tablet (5 mg) by mouth 2 times daily. 180 tablet 3    cholecalciferol, vitamin D3, (VITAMIN D3) 2,000 unit capsule Take 1,000 Units by mouth daily.      losartan (COZAAR) 25 MG tablet Take 0.5 tablets (12.5 mg) by mouth daily. 45 tablet 1    metoprolol succinate ER (TOPROL XL) 50 MG 24 hr tablet Take 1 tablet (50 mg) by mouth daily. 90 tablet 1     No Known Allergies       Lab Results    Chemistry/lipid CBC Cardiac Enzymes/BNP/TSH/INR   Recent Labs   Lab Test 07/17/24  0423   CHOL 160   HDL 78   LDL 67   TRIG 73     Recent Labs   Lab Test 07/17/24  0423 05/07/21  1146 01/14/19  1059   LDL 67 94 98     Recent Labs   Lab Test 08/09/24  1442      POTASSIUM 4.4   CHLORIDE 106   CO2 26   GLC 90   BUN 22.9   CR 1.20*   GFRESTIMATED 47*   PHILLY 9.4     Recent Labs   Lab Test 08/09/24  1442 07/18/24  0716 07/17/24  0423   CR 1.20* 1.09* 0.99*     Recent Labs   Lab Test 07/17/24  0423   A1C 5.1      Recent Labs   Lab Test 07/17/24  0423   WBC 10.2   HGB 13.1   HCT 39.6   MCV 89        Recent Labs   Lab Test 07/17/24  0423 07/16/24  1734 05/07/21  1146   HGB 13.1 13.4 14.2    No results for input(s): \"TROPONINI\" in the last 31679 hours.  No results for input(s): \"BNP\", \"NTBNPI\", \"NTBNP\" in the last 90443 hours.  Recent Labs   Lab Test 08/09/24  1442   TSH 3.54     Recent Labs   Lab Test 07/17/24  0423 07/16/24  1734   INR 1.06 1.03      50 minutes were spent on the date of encounter performing chart review, history and exam, documentation, and further activities as noted above.    The longitudinal plan of care for the diagnosis(es)/condition(s) as documented were addressed during this visit. Due to the added complexity in care, I will continue to support Marifer in the subsequent management and with " ongoing continuity of care.

## 2024-09-24 ENCOUNTER — ORDERS ONLY (AUTO-RELEASED) (OUTPATIENT)
Dept: CARDIOLOGY | Facility: CLINIC | Age: 74
End: 2024-09-24
Payer: COMMERCIAL

## 2024-09-24 DIAGNOSIS — I48.0 PAROXYSMAL ATRIAL FIBRILLATION (H): ICD-10-CM

## 2024-10-05 ENCOUNTER — HEALTH MAINTENANCE LETTER (OUTPATIENT)
Age: 74
End: 2024-10-05

## 2024-10-24 ENCOUNTER — PREP FOR PROCEDURE (OUTPATIENT)
Dept: CARDIOLOGY | Facility: CLINIC | Age: 74
End: 2024-10-24
Payer: COMMERCIAL

## 2024-10-24 ENCOUNTER — DOCUMENTATION ONLY (OUTPATIENT)
Dept: CARDIOLOGY | Facility: CLINIC | Age: 74
End: 2024-10-24
Payer: COMMERCIAL

## 2024-10-24 DIAGNOSIS — I48.0 PAROXYSMAL ATRIAL FIBRILLATION (H): Primary | ICD-10-CM

## 2024-10-24 RX ORDER — LIDOCAINE 40 MG/G
CREAM TOPICAL
Status: CANCELLED | OUTPATIENT
Start: 2024-10-24

## 2024-10-24 RX ORDER — POTASSIUM CHLORIDE 1500 MG/1
20 TABLET, EXTENDED RELEASE ORAL
Status: CANCELLED | OUTPATIENT
Start: 2024-10-24 | End: 2024-10-24

## 2024-10-24 NOTE — PROGRESS NOTES
H&P  PMD: []  Date: Card OV: []  Date:  Sent for Teach []   Orders: I [] P  []      AC: Eliquis NP Med Review: NEEDS review    DM Meds: None  GLP-1:None       Marifer Temple, 1950, [id number]  Home:[phone] (home) Cell:[phone] (mobile)  Emergency Contact: Mac Temple [phone]  PCP: Siomara Ott, [phone]    +++Important patient information for CSC/Cath Lab staff : None+++    [facility] EP Cath Lab Procedure Order   Procedure: Cardioversion for AF  Ordering Provider: Cleopatra Coelho NP  Date Ordered and Prepped: 10/24/2024 Mira Corrales RN  Anticipated Case Duration:  Standard  Scheduling Needs/Timeframe:  Next Available  Scheduling Contact: Please contact pt to schedule  Cardiology Follow Up Apt s/p:  Keep Dec as fu  Current Device/Device Co Needed for Procedure: None NoneNone  Pre-Procedural Testing needed: None  Anesthesia:  General    [facility] EP Cath Lab Prep   H&P:  Pt to schedule with PMD to complete  Pre-op Labs: K if pt taking diuretic medication or hx of low Potassium, Beta HcG if appropriate, and INR if on Warfarin will be ordered AM of procedure and Review of most recent labs, WEL for procedure  T&S Pre-Procedure Review: T&S is not required for DCCV/DFT Testing  Medical Records Pertinent for Procedure:  None  Iodinated Contrast Dye Allergies (Does not include Shellfish, Egg, and/or Iodine Allergy): NA  GLP-1 Protocol: If on Dulaglutide (Trulicity) (weekly)- Injection hold 7 days prior to procedure  , Exenatide extended release (Bydureon bcise) (weekly)- Injection hold 7 days prior to procedure, Exenatide (Byetta) (twice daily)- Oral Tablet hold day prior and morning of procedure and for Injection hold 7 days prior to procedure, Semaglutide (Ozempic) (weekly)- Injection and Oral hold 7 days prior to procedure, Liraglutide (Victoza, Saxenda) (daily)- Injection hold day prior and morning of procedure    No Known Allergies    Current Outpatient Medications:     apixaban ANTICOAGULANT  (ELIQUIS ANTICOAGULANT) 5 MG tablet, Take 1 tablet (5 mg) by mouth 2 times daily., Disp: 180 tablet, Rfl: 3    cholecalciferol, vitamin D3, (VITAMIN D3) 2,000 unit capsule, Take 1,000 Units by mouth daily., Disp: , Rfl:     losartan (COZAAR) 25 MG tablet, Take 0.5 tablets (12.5 mg) by mouth daily., Disp: 45 tablet, Rfl: 1    metoprolol succinate ER (TOPROL XL) 50 MG 24 hr tablet, Take 1 tablet (50 mg) by mouth daily., Disp: 90 tablet, Rfl: 1    Documentation Date:10/24/2024 11:53 AM  Mira Corrales RN

## 2024-10-25 NOTE — TELEPHONE ENCOUNTER
Lalo Bocanegra, PAVeneciaC  P [facility] Ep Support [facility]  Caller: Unspecified (Yesterday, 11:53 AM)  Hi,    Let's have her take 1/2 her metoprolol (25 mg) last dose prior to cardioversion.    Thanks,  Jorge

## 2024-10-28 ENCOUNTER — OFFICE VISIT (OUTPATIENT)
Dept: INTERNAL MEDICINE | Facility: CLINIC | Age: 74
End: 2024-10-28
Payer: COMMERCIAL

## 2024-10-28 VITALS
TEMPERATURE: 97.4 F | HEART RATE: 89 BPM | SYSTOLIC BLOOD PRESSURE: 150 MMHG | WEIGHT: 129.9 LBS | OXYGEN SATURATION: 99 % | BODY MASS INDEX: 24.52 KG/M2 | HEIGHT: 61 IN | DIASTOLIC BLOOD PRESSURE: 94 MMHG

## 2024-10-28 DIAGNOSIS — Z01.818 PREOP EXAM FOR INTERNAL MEDICINE: ICD-10-CM

## 2024-10-28 DIAGNOSIS — I48.0 PAF (PAROXYSMAL ATRIAL FIBRILLATION) (H): ICD-10-CM

## 2024-10-28 DIAGNOSIS — Z11.59 NEED FOR HEPATITIS C SCREENING TEST: Primary | ICD-10-CM

## 2024-10-28 DIAGNOSIS — I10 ESSENTIAL HYPERTENSION: ICD-10-CM

## 2024-10-28 PROCEDURE — 99214 OFFICE O/P EST MOD 30 MIN: CPT | Performed by: INTERNAL MEDICINE

## 2024-10-28 RX ORDER — LOSARTAN POTASSIUM 25 MG/1
25 TABLET ORAL DAILY
Qty: 90 TABLET | Refills: 3 | Status: SHIPPED | OUTPATIENT
Start: 2024-10-28

## 2024-10-28 ASSESSMENT — PAIN SCALES - GENERAL: PAINLEVEL_OUTOF10: NO PAIN (0)

## 2024-10-28 NOTE — PATIENT INSTRUCTIONS
Nothing by mouth 8 hours before the procedure   Have morning meds after procedure    Send me home BP readings in 2 weeks take every day send in a emids message   Increase losartan to 25mg once a day

## 2024-10-28 NOTE — H&P (VIEW-ONLY)
Preoperative Evaluation  [facility]  [address] [place] [address]  Phone: [phone]  Fax: [phone]  Primary Provider: Siomara Ott MD  Pre-op Performing Provider: Siomara Ott MD  Oct 28, 2024             10/25/2024   Surgical Information   What procedure is being done? Pre-Op for cardio conversion    Facility or Hospital where procedure/surgery will be performed: [facility] at [facility]    Who is doing the procedure / surgery? Dr Farooq/Cleopatra Carney    Date of surgery / procedure: 10-    Time of surgery / procedure: 8:30 a m    Where do you plan to recover after surgery? at home with family        Patient-reported     Fax number for surgical facility: [phone]    Assessment & Plan     The proposed surgical procedure is considered LOW risk.    Need for hepatitis C screening test      Essential Hypertension  Increase losartan to full tablet    Reminded to send BP readings   - losartan (COZAAR) 25 MG tablet; Take 1 tablet (25 mg) by mouth daily.    PAF (paroxysmal atrial fibrillation) (H)    Causing SOB and the need for cardioversion . Is on eliiquis   Preop exam for internal medicine              - No identified additional risk factors other than previously addressed         Recommendation  Approval given to proceed with proposed procedure, without further diagnostic evaluation.    Laurence Caicedo is a 74 year old, presenting for the following:  Pre-Op Exam (Patient reports they are here for Pre-Op exam for cardio conversion at [facility] at [facility] with Dr Carlos Carney on 10/31/2024 at 8:30 am.///)        HPI related to upcoming procedure:  cardioversion for PAF         10/25/2024   Pre-Op Questionnaire   Have you ever had a heart attack or stroke? (!) YES     Have you ever had surgery on your heart or blood vessels, such as a stent placement, a coronary artery bypass, or surgery on an artery  in your head, neck, heart, or legs? No    Do you have chest pain with activity? No    Do you have a history of heart failure? No    Do you currently have a cold, bronchitis or symptoms of other infection? No    Do you have a cough, shortness of breath, or wheezing? No    Do you or anyone in your family have previous history of blood clots? (!) UNKNOWN     Do you or does anyone in your family have a serious bleeding problem such as prolonged bleeding following surgeries or cuts? No    Have you ever had problems with anemia or been told to take iron pills? No    Have you had any abnormal blood loss such as black, tarry or bloody stools, or abnormal vaginal bleeding? No    Have you ever had a blood transfusion? No    Are you willing to have a blood transfusion if it is medically needed before, during, or after your surgery? (!) NO     Have you or any of your relatives ever had problems with anesthesia? No    Do you have sleep apnea, excessive snoring or daytime drowsiness? No    Do you have any artifical heart valves or other implanted medical devices like a pacemaker, defibrillator, or continuous glucose monitor? No    Do you have artificial joints? No    Are you allergic to latex? No        Patient-reported     Health Care Directive  Patient does not have a Health Care Directive:     Preoperative Review of             Patient Active Problem List    Diagnosis Date Noted    Paroxysmal atrial fibrillation (H) 09/10/2024     Priority: Medium    Cerebrovascular accident (CVA), unspecified mechanism (H) 07/16/2024     Priority: Medium    Carpal tunnel syndrome of right wrist 01/14/2019     Priority: Medium    Essential Hypertension      Priority: Medium     Created by Conversion  Replacement Utility updated for latest IMO load          Past Medical History:   Diagnosis Date    Atrial flutter (H)     Carpal tunnel syndrome of right wrist 01/14/2019    Cerebrovascular accident (CVA), unspecified mechanism (H) 07/16/2024  "   Hypertension     Paroxysmal atrial fibrillation (H) 09/10/2024     Past Surgical History:   Procedure Laterality Date    OTHER SURGICAL HISTORY      bowel obstruction pediatric    OTHER SURGICAL HISTORY      s/p partial colectomy as a child    OTHER SURGICAL HISTORY      right carpal  tunnel release     Current Outpatient Medications   Medication Sig Dispense Refill    apixaban ANTICOAGULANT (ELIQUIS ANTICOAGULANT) 5 MG tablet Take 1 tablet (5 mg) by mouth 2 times daily. 180 tablet 3    cholecalciferol, vitamin D3, (VITAMIN D3) 2,000 unit capsule Take 1,000 Units by mouth daily.      losartan (COZAAR) 25 MG tablet Take 0.5 tablets (12.5 mg) by mouth daily. 45 tablet 1    metoprolol succinate ER (TOPROL XL) 50 MG 24 hr tablet Take 1 tablet (50 mg) by mouth daily. 90 tablet 1       No Known Allergies     Social History     Tobacco Use    Smoking status: Never    Smokeless tobacco: Never   Substance Use Topics    Alcohol use: Not on file       History   Drug Use Unknown               Objective    BP (!) 150/94 (BP Location: Left arm, Patient Position: Sitting, Cuff Size: Adult Regular)   Pulse 89   Temp 97.4  F (36.3  C) (Tympanic)   Ht 1.543 m (5' 0.75\")   Wt 58.9 kg (129 lb 14.4 oz)   LMP  (LMP Unknown)   SpO2 99%   Breastfeeding No   BMI 24.75 kg/m     Estimated body mass index is 24.75 kg/m  as calculated from the following:    Height as of this encounter: 1.543 m (5' 0.75\").    Weight as of this encounter: 58.9 kg (129 lb 14.4 oz).  Physical Exam  GENERAL: alert and no distress  NECK: no adenopathy, no asymmetry, masses, or scars  RESP: lungs clear to auscultation - no rales, rhonchi or wheezes  CV: regular rate and rhythm, normal S1 S2, no S3 or S4, no murmur, click or rub, no peripheral edema  MS: no gross musculoskeletal defects noted, no edema    Recent Labs   Lab Test 08/09/24  1442 07/18/24  0716 07/17/24  0423 07/16/24  1734   HGB  --   --  13.1 13.4   PLT  --   --  201 200   INR  --   --  1.06 " 1.03    146* 147* 141   POTASSIUM 4.4 4.0 4.1 3.9   CR 1.20* 1.09* 0.99* 1.08*   A1C  --   --  5.1  --         Diagnostics  No labs were ordered during this visit.   No EKG this visit, completed in the last 90 days.    Revised Cardiac Risk Index (RCRI)  The patient has the following serious cardiovascular risks for perioperative complications:   - No serious cardiac risks = 0 points     RCRI Interpretation: 0 points: Class I (very low risk - 0.4% complication rate)         Signed Electronically by: Siomara Ott MD  A copy of this evaluation report is provided to the requesting physician.

## 2024-10-28 NOTE — PROGRESS NOTES
Preoperative Evaluation  [facility]  [address] [place] [address]  Phone: [phone]  Fax: [phone]  Primary Provider: Siomara Ott MD  Pre-op Performing Provider: Siomara Ott MD  Oct 28, 2024             10/25/2024   Surgical Information   What procedure is being done? Pre-Op for cardio conversion    Facility or Hospital where procedure/surgery will be performed: [facility]    Who is doing the procedure / surgery? Dr Farooq/Cleopatra Carney    Date of surgery / procedure: 10-    Time of surgery / procedure: 8:30 a m    Where do you plan to recover after surgery? at home with family        Patient-reported     Fax number for surgical facility: [phone]    Assessment & Plan     The proposed surgical procedure is considered LOW risk.    Need for hepatitis C screening test      Essential Hypertension  Increase losartan to full tablet    Reminded to send BP readings   - losartan (COZAAR) 25 MG tablet; Take 1 tablet (25 mg) by mouth daily.    PAF (paroxysmal atrial fibrillation) (H)    Causing SOB and the need for cardioversion . Is on eliiquis   Preop exam for internal medicine              - No identified additional risk factors other than previously addressed         Recommendation  Approval given to proceed with proposed procedure, without further diagnostic evaluation.    Laurence Caicedo is a 74 year old, presenting for the following:  Pre-Op Exam (Patient reports they are here for Pre-Op exam for cardio conversion at [facility] with Dr Carlos Carney on 10/31/2024 at 8:30 am.///)        HPI related to upcoming procedure:  cardioversion for PAF         10/25/2024   Pre-Op Questionnaire   Have you ever had a heart attack or stroke? (!) YES     Have you ever had surgery on your heart or blood vessels, such as a stent placement, a coronary artery bypass, or surgery on an artery  in your head, neck, heart, or legs? No    Do you have chest pain with activity? No    Do you have a history of heart failure? No    Do you currently have a cold, bronchitis or symptoms of other infection? No    Do you have a cough, shortness of breath, or wheezing? No    Do you or anyone in your family have previous history of blood clots? (!) UNKNOWN     Do you or does anyone in your family have a serious bleeding problem such as prolonged bleeding following surgeries or cuts? No    Have you ever had problems with anemia or been told to take iron pills? No    Have you had any abnormal blood loss such as black, tarry or bloody stools, or abnormal vaginal bleeding? No    Have you ever had a blood transfusion? No    Are you willing to have a blood transfusion if it is medically needed before, during, or after your surgery? (!) NO     Have you or any of your relatives ever had problems with anesthesia? No    Do you have sleep apnea, excessive snoring or daytime drowsiness? No    Do you have any artifical heart valves or other implanted medical devices like a pacemaker, defibrillator, or continuous glucose monitor? No    Do you have artificial joints? No    Are you allergic to latex? No        Patient-reported     Health Care Directive  Patient does not have a Health Care Directive:     Preoperative Review of             Patient Active Problem List    Diagnosis Date Noted    Paroxysmal atrial fibrillation (H) 09/10/2024     Priority: Medium    Cerebrovascular accident (CVA), unspecified mechanism (H) 07/16/2024     Priority: Medium    Carpal tunnel syndrome of right wrist 01/14/2019     Priority: Medium    Essential Hypertension      Priority: Medium     Created by Conversion  Replacement Utility updated for latest IMO load          Past Medical History:   Diagnosis Date    Atrial flutter (H)     Carpal tunnel syndrome of right wrist 01/14/2019    Cerebrovascular accident (CVA), unspecified mechanism (H) 07/16/2024  "   Hypertension     Paroxysmal atrial fibrillation (H) 09/10/2024     Past Surgical History:   Procedure Laterality Date    OTHER SURGICAL HISTORY      bowel obstruction pediatric    OTHER SURGICAL HISTORY      s/p partial colectomy as a child    OTHER SURGICAL HISTORY      right carpal  tunnel release     Current Outpatient Medications   Medication Sig Dispense Refill    apixaban ANTICOAGULANT (ELIQUIS ANTICOAGULANT) 5 MG tablet Take 1 tablet (5 mg) by mouth 2 times daily. 180 tablet 3    cholecalciferol, vitamin D3, (VITAMIN D3) 2,000 unit capsule Take 1,000 Units by mouth daily.      losartan (COZAAR) 25 MG tablet Take 0.5 tablets (12.5 mg) by mouth daily. 45 tablet 1    metoprolol succinate ER (TOPROL XL) 50 MG 24 hr tablet Take 1 tablet (50 mg) by mouth daily. 90 tablet 1       No Known Allergies     Social History     Tobacco Use    Smoking status: Never    Smokeless tobacco: Never   Substance Use Topics    Alcohol use: Not on file       History   Drug Use Unknown               Objective    BP (!) 150/94 (BP Location: Left arm, Patient Position: Sitting, Cuff Size: Adult Regular)   Pulse 89   Temp 97.4  F (36.3  C) (Tympanic)   Ht 1.543 m (5' 0.75\")   Wt 58.9 kg (129 lb 14.4 oz)   LMP  (LMP Unknown)   SpO2 99%   Breastfeeding No   BMI 24.75 kg/m     Estimated body mass index is 24.75 kg/m  as calculated from the following:    Height as of this encounter: 1.543 m (5' 0.75\").    Weight as of this encounter: 58.9 kg (129 lb 14.4 oz).  Physical Exam  GENERAL: alert and no distress  NECK: no adenopathy, no asymmetry, masses, or scars  RESP: lungs clear to auscultation - no rales, rhonchi or wheezes  CV: regular rate and rhythm, normal S1 S2, no S3 or S4, no murmur, click or rub, no peripheral edema  MS: no gross musculoskeletal defects noted, no edema    Recent Labs   Lab Test 08/09/24  1442 07/18/24  0716 07/17/24  0423 07/16/24  1734   HGB  --   --  13.1 13.4   PLT  --   --  201 200   INR  --   --  1.06 " 1.03    146* 147* 141   POTASSIUM 4.4 4.0 4.1 3.9   CR 1.20* 1.09* 0.99* 1.08*   A1C  --   --  5.1  --         Diagnostics  No labs were ordered during this visit.   No EKG this visit, completed in the last 90 days.    Revised Cardiac Risk Index (RCRI)  The patient has the following serious cardiovascular risks for perioperative complications:   - No serious cardiac risks = 0 points     RCRI Interpretation: 0 points: Class I (very low risk - 0.4% complication rate)         Signed Electronically by: Siomara Ott MD  A copy of this evaluation report is provided to the requesting physician.

## 2024-10-29 ENCOUNTER — TELEPHONE (OUTPATIENT)
Dept: CARDIOLOGY | Facility: CLINIC | Age: 74
End: 2024-10-29
Payer: COMMERCIAL

## 2024-10-29 NOTE — TELEPHONE ENCOUNTER
Pre-Procedure Education    Procedure: DCCV with Chitra Torres NP on 10/30/2024 with arrival time 8:30 am      PT IS ON ELIQUIS AND NO MISSED DOSES    Orders: Orderset for procedure verified signed/held    COVID: COVID policy- if pt develops COVID like symptoms prior to procedure, he/she would need to complete an at home with a rapid antigen COVID test 1-2 days prior to your procedure date. If COVID + pt is aware the procedure will need to be rescheduled, and to contact CV scheduling as soon as possible    Pre-Op H&P:  PRE OP AT DR LILLY 10/28/2024 - Available in Epic    Education:     PT HAS A  FOR PROCEDURE THAT WILL STAY WITH HER    PT HAS MY CHART AND REVIEWED    PT INSTRUCTED TO HOLD ANY VITAMINS MINERALS CALCIUM IRON OR SUPPLEMENTS THE MORNING OF CV  PT  INSTRUCTED NO GUM CHEWING MINTS OR CANDY THE MORNING OF CV  PT INSTRUCTED TO BATHE OR SHOWER BEFORE COMING IN  PT INSTRUCTED TO LEAVE JEWELRY AT HOME  PT IS ON ELIQUIS  PT INSTRUCTED TO DECREASE HER METOPROLOL TO 25 MG THE MORNING OF CV   PT HAS A POST CV FOLLOW UP WITH GAYLA 12/4    Contact: Reviewed via phone with pt    Pre-Procedure Instruction: NPO after midnight pre procedure, Defined NPO with pt, Remove all jewelry and leave all valuables at home, Shower prior to arrival, Sedation plan/orders, Transportation requirements and arrangements post procedure, Post-procedure follow up process, Post-procedure restrictions/expectations, and Pre-procedure letter sent- letter tab  Risks:      Medication:   Instructions regarding anticoagulants: Eliquis- To continue anticoagulation uninterrupted through their procedure    Instructions regarding antiarrhythmic medication: Beta Blocker;  PT INSTRUCTED TO DECREASE HER METOPROLOL TO 25 MG THE MORNING OF CV     Instructions given to pt regarding diuretics medication: NA for DCCV    Instructions given to pt regarding DM/GLP-1 medication:   DM- None  GLP-1- None    Instructions for medication, other than  anticoagulants and antiarrhythmics listed above, given to pt: Take all medication AM of procedure with small sips of water     Important patient information for staff: None      10/29/2024 8:40 AM  Faye Breen LPN

## 2024-10-31 ENCOUNTER — ANESTHESIA EVENT (OUTPATIENT)
Dept: CARDIOLOGY | Facility: HOSPITAL | Age: 74
End: 2024-10-31
Payer: COMMERCIAL

## 2024-10-31 ENCOUNTER — HOSPITAL ENCOUNTER (OUTPATIENT)
Dept: CARDIOLOGY | Facility: HOSPITAL | Age: 74
Discharge: HOME | End: 2024-10-31
Attending: NURSE PRACTITIONER | Admitting: NURSE PRACTITIONER
Payer: COMMERCIAL

## 2024-10-31 ENCOUNTER — ANESTHESIA (OUTPATIENT)
Dept: CARDIOLOGY | Facility: HOSPITAL | Age: 74
End: 2024-10-31
Payer: COMMERCIAL

## 2024-10-31 VITALS
BODY MASS INDEX: 24.35 KG/M2 | RESPIRATION RATE: 25 BRPM | HEART RATE: 65 BPM | DIASTOLIC BLOOD PRESSURE: 77 MMHG | TEMPERATURE: 97.5 F | SYSTOLIC BLOOD PRESSURE: 126 MMHG | HEIGHT: 61 IN | OXYGEN SATURATION: 95 % | WEIGHT: 129 LBS

## 2024-10-31 DIAGNOSIS — I48.0 PAROXYSMAL ATRIAL FIBRILLATION (H): ICD-10-CM

## 2024-10-31 LAB
ATRIAL RATE - MUSE: 60 BPM
DIASTOLIC BLOOD PRESSURE - MUSE: NORMAL MMHG
INTERPRETATION ECG - MUSE: NORMAL
P AXIS - MUSE: 60 DEGREES
PR INTERVAL - MUSE: 190 MS
QRS DURATION - MUSE: 70 MS
QT - MUSE: 436 MS
QTC - MUSE: 436 MS
R AXIS - MUSE: -18 DEGREES
SYSTOLIC BLOOD PRESSURE - MUSE: NORMAL MMHG
T AXIS - MUSE: 61 DEGREES
VENTRICULAR RATE- MUSE: 60 BPM

## 2024-10-31 PROCEDURE — 93005 ELECTROCARDIOGRAM TRACING: CPT

## 2024-10-31 PROCEDURE — 250N000009 HC RX 250

## 2024-10-31 PROCEDURE — 92960 CARDIOVERSION ELECTRIC EXT: CPT

## 2024-10-31 PROCEDURE — 99100 ANES PT EXTEME AGE<1 YR&>70: CPT

## 2024-10-31 PROCEDURE — 93010 ELECTROCARDIOGRAM REPORT: CPT | Performed by: INTERNAL MEDICINE

## 2024-10-31 PROCEDURE — 999N000054 HC STATISTIC EKG NON-CHARGEABLE

## 2024-10-31 PROCEDURE — 370N000017 HC ANESTHESIA TECHNICAL FEE, PER MIN

## 2024-10-31 PROCEDURE — 92960 CARDIOVERSION ELECTRIC EXT: CPT | Performed by: ANESTHESIOLOGY

## 2024-10-31 RX ORDER — LIDOCAINE 40 MG/G
CREAM TOPICAL
Status: DISCONTINUED | OUTPATIENT
Start: 2024-10-31 | End: 2024-10-31 | Stop reason: HOSPADM

## 2024-10-31 RX ORDER — POTASSIUM CHLORIDE 1500 MG/1
20 TABLET, EXTENDED RELEASE ORAL
Status: DISCONTINUED | OUTPATIENT
Start: 2024-10-31 | End: 2024-10-31 | Stop reason: HOSPADM

## 2024-10-31 RX ORDER — FLECAINIDE ACETATE 50 MG/1
50 TABLET ORAL 2 TIMES DAILY
Qty: 60 TABLET | Refills: 3 | Status: SHIPPED | OUTPATIENT
Start: 2024-10-31

## 2024-10-31 RX ADMIN — METHOHEXITAL SODIUM 60 MG: 500 INJECTION, POWDER, LYOPHILIZED, FOR SOLUTION INTRAMUSCULAR; INTRAVENOUS; RECTAL at 09:39

## 2024-10-31 ASSESSMENT — ACTIVITIES OF DAILY LIVING (ADL)
ADLS_ACUITY_SCORE: 0

## 2024-10-31 ASSESSMENT — ENCOUNTER SYMPTOMS: DYSRHYTHMIAS: 1

## 2024-10-31 NOTE — ANESTHESIA POSTPROCEDURE EVALUATION
Patient: Marifer Temple    Procedure: * No procedures listed *  Cardioversion External    Anesthesia Type:  General    Note:  Disposition: Outpatient   Postop Pain Control: Uneventful            Sign Out: Well controlled pain   PONV: No   Neuro/Psych: Uneventful            Sign Out: Acceptable/Baseline neuro status   Airway/Respiratory: Uneventful            Sign Out: Acceptable/Baseline resp. status   CV/Hemodynamics: Uneventful            Sign Out: Acceptable CV status; No obvious hypovolemia; No obvious fluid overload   Other NRE: NONE   DID A NON-ROUTINE EVENT OCCUR? No           Last vitals:  Vitals:    10/31/24 0930 10/31/24 0942 10/31/24 0946   BP:  (!) 178/126 (!) 146/91   Pulse: 92 98 65   Resp: 30 16 23   Temp:   36.4  C (97.5  F)   SpO2: 96% 98% 98%       Electronically Signed By: Lilli Pearce MD  October 31, 2024  9:46 AM

## 2024-10-31 NOTE — ANESTHESIA CARE TRANSFER NOTE
Patient: Marifer Temple    Procedure: * No procedures listed *  Cardioversion External    Diagnosis: * No pre-op diagnosis entered *  Diagnosis Additional Information: No value filed.    Anesthesia Type:   No value filed.     Note:    Oropharynx: oropharynx clear of all foreign objects and spontaneously breathing  Level of Consciousness: drowsy  Oxygen Supplementation: nasal cannula  Level of Supplemental Oxygen (L/min / FiO2): 4  Independent Airway: airway patency satisfactory and stable  Dentition: dentition unchanged  Vital Signs Stable: post-procedure vital signs reviewed and stable  Report to RN Given: handoff report given  Patient transferred to: Cardiac [facility]          Vitals:  Vitals Value Taken Time   /126 10/31/24 0942   Temp 97.1 F 10/31/24 0942   Pulse 70 10/31/24 0943   Resp 27 10/31/24 0943   SpO2 97 % 10/31/24 0943   Vitals shown include unfiled device data.    Electronically Signed By: CURTIS Raphael CRNA  October 31, 2024  9:44 AM

## 2024-10-31 NOTE — ANESTHESIA PREPROCEDURE EVALUATION
Anesthesia Pre-Procedure Evaluation    Patient: Marifer Temple   MRN: [id number] : 1950        Procedure : * No procedures listed *  Cardioversion External       Past Medical History:   Diagnosis Date    Atrial flutter (H)     Carpal tunnel syndrome of right wrist 2019    Cerebrovascular accident (CVA), unspecified mechanism (H) 2024    Hypertension     Paroxysmal atrial fibrillation (H) 09/10/2024      Past Surgical History:   Procedure Laterality Date    OTHER SURGICAL HISTORY      bowel obstruction pediatric    OTHER SURGICAL HISTORY      s/p partial colectomy as a child    OTHER SURGICAL HISTORY      right carpal  tunnel release      No Known Allergies   Social History     Tobacco Use    Smoking status: Never    Smokeless tobacco: Never   Substance Use Topics    Alcohol use: Not Currently      Wt Readings from Last 1 Encounters:   10/31/24 58.5 kg (129 lb)        Anesthesia Evaluation   Pt has had prior anesthetic.         ROS/MED HX  ENT/Pulmonary:       Neurologic:       Cardiovascular:     (+)  hypertension- -   -  - -                        dysrhythmias,              METS/Exercise Tolerance:     Hematologic:       Musculoskeletal:       GI/Hepatic:       Renal/Genitourinary:       Endo:       Psychiatric/Substance Use:       Infectious Disease:       Malignancy:       Other:            Physical Exam    Airway        Mallampati: II    Neck ROM: full     Respiratory Devices and Support         Dental       (+) Minor Abnormalities - some fillings, tiny chips      Cardiovascular          Rhythm and rate: irregular     Pulmonary   pulmonary exam normal                OUTSIDE LABS:  CBC:   Lab Results   Component Value Date    WBC 10.2 2024    WBC 11.4 (H) 2024    HGB 13.1 2024    HGB 13.4 2024    HCT 39.6 2024    HCT 39.9 2024     2024     2024     BMP:   Lab Results   Component Value Date     2024     (H)  "07/18/2024    POTASSIUM 4.4 08/09/2024    POTASSIUM 4.0 07/18/2024    CHLORIDE 106 08/09/2024    CHLORIDE 106 07/18/2024    CO2 26 08/09/2024    CO2 25 07/18/2024    BUN 22.9 08/09/2024    BUN 24.9 (H) 07/18/2024    CR 1.20 (H) 08/09/2024    CR 1.09 (H) 07/18/2024    GLC 90 08/09/2024     (H) 07/18/2024     COAGS:   Lab Results   Component Value Date    PTT 28 07/17/2024    INR 1.06 07/17/2024     POC: No results found for: \"BGM\", \"HCG\", \"HCGS\"  HEPATIC:   Lab Results   Component Value Date    ALBUMIN 4.3 08/09/2024    PROTTOTAL 7.7 08/09/2024    ALT 11 08/09/2024    AST 25 08/09/2024    ALKPHOS 120 08/09/2024    BILITOTAL 0.8 08/09/2024     OTHER:   Lab Results   Component Value Date    A1C 5.1 07/17/2024    PHILLY 9.4 08/09/2024    TSH 3.54 08/09/2024       Anesthesia Plan    ASA Status:  3       Anesthesia Type: General.     - Airway: Mask Only              Consents    Anesthesia Plan(s) and associated risks, benefits, and realistic alternatives discussed. Questions answered and patient/representative(s) expressed understanding.     - Discussed: Risks, Benefits and Alternatives for the PROCEDURE were discussed     - Discussed with:  Patient      - Extended Intubation/Ventilatory Support Discussed: No.      - Patient is DNR/DNI Status: No     Use of blood products discussed: No .     Postoperative Care            Comments:               Lilli Pearce MD    I have reviewed the pertinent notes and labs in the chart from the past 30 days and (re)examined the patient.  Any updates or changes from those notes are reflected in this note.            # Drug Induced Coagulation Defect: home medication list includes an anticoagulant medication    # Hypertension: Noted on problem list                   "

## 2024-10-31 NOTE — PROCEDURES
[facility]    Procedure: Cardioversion    Date/Time: 10/31/2024 10:34 AM    Performed by: Lalo Bocanegra PA-C  Authorized by: Cleopatra Coelho APRN CNP      UNIVERSAL PROTOCOL   Site Marked: NA  Prior Images Obtained and Reviewed:  Yes  Required items: Required blood products, implants, devices and special equipment available    Patient identity confirmed:  Verbally with patient, arm band and provided demographic data  Patient was reevaluated immediately before administering moderate or deep sedation or anesthesia  Confirmation Checklist:  Patient's identity using two indicators, relevant allergies, procedure was appropriate and matched the consent or emergent situation and correct equipment/implants were available  Time out: Immediately prior to the procedure a time out was called    Universal Protocol: the Joint Commission Universal Protocol was followed       ANESTHESIA    Anesthesia was administered and monitored by anesthesiology.  See anesthesia documentation for details.    SEDATION  Patient Sedated: Yes    Vital signs: Vital signs monitored during sedation      PROCEDURE DETAILS  Cardioversion basis: elective  Pre-procedure rhythm: atrial fibrillation  Patient position: patient was placed in a supine position  Chest area: chest area exposed  Electrodes: pads  Electrodes placed: anterior-posterior  Number of attempts: 2    Details of Attempts:  At 0943, after administration of IV Brevital by MDA and confirmation of adequate sedation, she received 2 synchronous shocks at 200J and 300J with prompt restoration of sinus rhythm after the second shock. Post cardioversion EKG pending.    Post-procedure rhythm: normal sinus rhythm  Complications: no complications      PROCEDURE  Describe Procedure: New diagnosis of paroxysmal atrial fibrillation with symptoms including palpitations and dyspnea with more strenuous exertion such as stairs. She was started on metoprolol and scheduled for  cardioversion. Successful cardioversion today. [id number] score: 5 for age, gender, HTN, and prior CVA. Continue Eliquis 5 mg BID for stroke prophylaxis. Prior stress test was negative, will start on flecainide 50 mg BID for rate control. Continue metoprolol as ordered. Follow up 12/4 with Cleopatra Coelho CNP.    DCCV done under direct supervision of Cleopatra Coelho CNP   Patient Tolerance:  Patient tolerated the procedure well with no immediate complications

## 2024-10-31 NOTE — PROGRESS NOTES
Discharge home.  Sinus post cardioversion x2.  No questions.  Understanding new medications.  Follow up scheduled.

## 2024-11-05 ENCOUNTER — PATIENT OUTREACH (OUTPATIENT)
Dept: CARE COORDINATION | Facility: CLINIC | Age: 74
End: 2024-11-05
Payer: COMMERCIAL

## 2024-11-07 ENCOUNTER — ANCILLARY PROCEDURE (OUTPATIENT)
Dept: BONE DENSITY | Facility: CLINIC | Age: 74
End: 2024-11-07
Attending: INTERNAL MEDICINE
Payer: COMMERCIAL

## 2024-11-07 ENCOUNTER — PATIENT OUTREACH (OUTPATIENT)
Dept: CARE COORDINATION | Facility: CLINIC | Age: 74
End: 2024-11-07

## 2024-11-07 ENCOUNTER — ANCILLARY PROCEDURE (OUTPATIENT)
Dept: MAMMOGRAPHY | Facility: CLINIC | Age: 74
End: 2024-11-07
Attending: INTERNAL MEDICINE
Payer: COMMERCIAL

## 2024-11-07 DIAGNOSIS — M81.0 OSTEOPOROSIS, UNSPECIFIED OSTEOPOROSIS TYPE, UNSPECIFIED PATHOLOGICAL FRACTURE PRESENCE: ICD-10-CM

## 2024-11-07 DIAGNOSIS — Z12.31 ENCOUNTER FOR SCREENING MAMMOGRAM FOR BREAST CANCER: ICD-10-CM

## 2024-11-07 PROCEDURE — 77067 SCR MAMMO BI INCL CAD: CPT | Mod: TC | Performed by: STUDENT IN AN ORGANIZED HEALTH CARE EDUCATION/TRAINING PROGRAM

## 2024-11-07 PROCEDURE — 77091 TBS TECHL CALCULATION ONLY: CPT | Performed by: RADIOLOGY

## 2024-11-07 PROCEDURE — 77063 BREAST TOMOSYNTHESIS BI: CPT | Mod: TC | Performed by: STUDENT IN AN ORGANIZED HEALTH CARE EDUCATION/TRAINING PROGRAM

## 2024-11-07 PROCEDURE — 77080 DXA BONE DENSITY AXIAL: CPT | Mod: TC | Performed by: RADIOLOGY

## 2024-11-14 ENCOUNTER — VIRTUAL VISIT (OUTPATIENT)
Dept: INTERNAL MEDICINE | Facility: CLINIC | Age: 74
End: 2024-11-14
Payer: COMMERCIAL

## 2024-11-14 DIAGNOSIS — I10 ESSENTIAL HYPERTENSION: ICD-10-CM

## 2024-11-14 DIAGNOSIS — M81.0 AGE-RELATED OSTEOPOROSIS WITHOUT CURRENT PATHOLOGICAL FRACTURE: ICD-10-CM

## 2024-11-14 DIAGNOSIS — N18.32 STAGE 3B CHRONIC KIDNEY DISEASE (H): ICD-10-CM

## 2024-11-14 DIAGNOSIS — M81.0 SENILE OSTEOPOROSIS: ICD-10-CM

## 2024-11-14 DIAGNOSIS — R07.89 ATYPICAL CHEST PAIN: Primary | ICD-10-CM

## 2024-11-14 PROCEDURE — 99214 OFFICE O/P EST MOD 30 MIN: CPT | Mod: 95 | Performed by: INTERNAL MEDICINE

## 2024-11-14 RX ORDER — LOSARTAN POTASSIUM 25 MG/1
25 TABLET ORAL DAILY
Qty: 90 TABLET | Refills: 3 | Status: SHIPPED | OUTPATIENT
Start: 2024-11-14

## 2024-11-14 NOTE — PROGRESS NOTES
Marifer is a 74 year old who is being evaluated via a billable video visit.    How would you like to obtain your AVS? MyChart  If the video visit is dropped, the invitation should be resent by: Send to e-mail at: [email or web address]  Will anyone else be joining your video visit? Yes:  . How would they like to receive their invitation? Other e-mail: joining Marifer in person      Assessment & Plan     Essential Hypertension  Controlled  - losartan (COZAAR) 25 MG tablet; Take 1 tablet (25 mg) by mouth daily.    Atypical chest pain    He has this unusual sensation in the chest it is not exertional it is not associated with shortness of breath she does not know how to describe it but says it is not a pain but it is a funny feeling she has especially when she bends down.  She cannot really identify triggering or foods.  Of note she had a normal stress test and she is now in sinus rhythm according to her cardia mobile post ablation.  She had a normal echo so valvular dysfunction is unlikely.  She could try Pepcid or Pepto-Bismol.  Anxiety could be playing a role she will monitor it for now  Senile osteoporosis  Discussed that her bone density now has osteoporosis.  Given her GFR I would recommend Prolia as first-line over Fosamax right now she has to have some dental extractions done we will defer starting Prolia till after that is completed.    Stage 3b chronic kidney disease (H)  glomerular filtration rate  GFR Estimate   Date Value Ref Range Status   08/09/2024 47 (L) >60 mL/min/1.73m2 Final     Comment:     eGFR calculated using 2021 CKD-EPI equation.   05/07/2021 53 (L) >60 mL/min/1.73m2 Final                     Subjective   Marifer is a 74 year old, presenting for the following health issues:  Results and office visit (Patient reports they are here to follow up on bone density test results. )    GFR Estimate   Date Value Ref Range Status   08/09/2024 47 (L) >60 mL/min/1.73m2 Final     Comment:     eGFR calculated  using 2021 CKD-EPI equation.   07/18/2024 53 (L) >60 mL/min/1.73m2 Final     Comment:     eGFR calculated using 2021 CKD-EPI equation.   07/17/2024 60 (L) >60 mL/min/1.73m2 Final     Comment:     eGFR calculated using 2021 CKD-EPI equation.   05/07/2021 53 (L) >60 mL/min/1.73m2 Final   05/09/2019 51 (L) >60 mL/min/1.73m2 Final   03/18/2019 43 (L) >60 mL/min/1.73m2 Final           11/14/2024     7:13 AM   Additional Questions   Roomed by Harry   Accompanied by          11/14/2024     7:13 AM   Patient Reported Additional Medications   Patient reports taking the following new medications none     Video Start Time:     History of Present Illness       Reason for visit:  Review bone density test   She is taking medications regularly.                   Objective    Vitals - Patient Reported  Systolic (Patient Reported): 147  Diastolic (Patient Reported): 90  Pulse (Patient Reported): 60  Pain Score: No Pain (0)        Physical Exam   GENERAL: alert and no distress  EYES: Eyes grossly normal to inspection.  No discharge or erythema, or obvious scleral/conjunctival abnormalities.  RESP: No audible wheeze, cough, or visible cyanosis.    SKIN: Visible skin clear. No significant rash, abnormal pigmentation or lesions.  NEURO: Cranial nerves grossly intact.  Mentation and speech appropriate for age.  PSYCH: Appropriate affect, tone, and pace of words          Video-Visit Details    Type of service:  Video Visit   Video End Time: Duration 18 minutes  Originating Location (pt. Location):     Distant Location (provider location):    Platform used for Video Visit:   Signed Electronically by: Siomara Ott MD

## 2024-12-04 ENCOUNTER — OFFICE VISIT (OUTPATIENT)
Dept: CARDIOLOGY | Facility: CLINIC | Age: 74
End: 2024-12-04
Payer: COMMERCIAL

## 2024-12-04 VITALS
RESPIRATION RATE: 17 BRPM | SYSTOLIC BLOOD PRESSURE: 158 MMHG | WEIGHT: 128 LBS | HEART RATE: 59 BPM | DIASTOLIC BLOOD PRESSURE: 87 MMHG | BODY MASS INDEX: 24.17 KG/M2 | HEIGHT: 61 IN

## 2024-12-04 DIAGNOSIS — I48.19 PERSISTENT ATRIAL FIBRILLATION (H): Primary | ICD-10-CM

## 2024-12-04 DIAGNOSIS — I10 ESSENTIAL HYPERTENSION: ICD-10-CM

## 2024-12-04 DIAGNOSIS — I48.0 PAROXYSMAL ATRIAL FIBRILLATION (H): ICD-10-CM

## 2024-12-04 PROCEDURE — G2211 COMPLEX E/M VISIT ADD ON: HCPCS | Performed by: NURSE PRACTITIONER

## 2024-12-04 PROCEDURE — 99214 OFFICE O/P EST MOD 30 MIN: CPT | Performed by: NURSE PRACTITIONER

## 2024-12-04 RX ORDER — FLECAINIDE ACETATE 50 MG/1
50 TABLET ORAL 2 TIMES DAILY
Qty: 180 TABLET | Refills: 3 | Status: SHIPPED | OUTPATIENT
Start: 2024-12-04

## 2024-12-04 NOTE — LETTER
12/4/2024    Siomara Ott MD  [address] [facility] [address]    RE: Marifer Temple       Dear Colleague,     I had the pleasure of seeing Marifer Temple in the [facility].    HEART CARE ELECTROPHYSIOLOGY NOTE      [facility]  [phone]      Assessment/Recommendations   Assessment/Plan:  1.  Paroxysmal Atrial Fibrillation: Symptomatic improvement with maintenance of sinus rhythm.  We had a lengthy discussion of the physiology and natural progression of atrial fibrillation and treatment options with medications or pulmonary vein isolation ablation.  She was given information to review at home.  -- Continue flecainide 50 mg twice daily with metoprolol XL 50 mg daily  -- Consideration for ablation (after dental work completed)  -- Utilize LocalMeddia device for rhythm monitoring    She was reassured that atrial fibrillation is not life-threatening, but carries an increased risk for stroke.  She has a [id number] score of 5 for age 65-74, female gender, HTN, CVA.    -- Continue Eliquis 5 mg twice daily    2.  Nonsustained ventricular tachycardia: Very short run of NSVT seen on MCT.  Echo and nuclear stress test unremarkable, thus considered very low risk.  No further intervention recommended at this time.    3.  Hypertension: Blood pressure elevated today in clinic but controlled per home readings.  Metoprolol XL and losartan.    Follow up in 3 months     History of Present Illness/Subjective    HPI: Marifer Temple is a 74 year old female who comes in today accompanied by her  for EP follow-up of atrial fibrillation.  She has a history of paroxysmal atrial fibrillation, NSVT, hypertension, stage III CKD, CVA (7/16/2024,s/p tPA). MRI brain showed small, late acute vs subacute infarct of the right precentral gyrus and subcortical white matter; small old lacunar infarct of the posterior limb of the right internal capsule/dorsal lentiform nucleus; mild to mdoerate  chronic small vessel changes.  No evidence of A-fib during hospitalization, but subsequently seen on MCT.  Unclear if CVA due to hypertension/small vessel disease or atrial arrhythmias.  Her  Glenn also has A-fib and is a patient of mine as well.    AF Arrhythmia history  Dx/date: PAF 7/2024 documented on MCT  Sx: Palpitations, fluttering in the base of her throat, dyspnea with more strenuous exertion such as stairs  [id number] score: 5 for age 65-74, female gender, HTN, CVA  Oral anticoagulation: Eliquis 5 mg twice daily  Antiarrhythmic medications, AV acosta blocking agents: Metoprolol XL  Procedures  DCCV: 10/31/2024  Ablation: None    Marifer states that she feels well and remains very active.  She reports significant symptomatic improvement after cardioversion, particularly no longer having any dyspnea on exertion.  She has not had any A-fib and has checked her rhythm using her Kardia device.  She denies chest discomfort, palpitations, abdominal fullness/bloating or peripheral edema, shortness of breath, paroxysmal nocturnal dyspnea, orthopnea, lightheadedness, dizziness, pre-syncope, or syncope.    Cardiographics (EKG, MCTs personally reviewed):  EKG done 10/31/2024 shows sinus rhythm at 60 bpm, QRS 70 ms, QT/QTc 436/436 ms  EKG done 8/30/2024 shows atrial fibrillation with rapid ventricular response at 106 bpm  EKG done 7/16/2024 shows sinus rhythm at 71 bpm, QRS 76 ms, QT/QTc 418/454 ms    Zio monitoring from 9/30/2024 to 10/14/2024 (duration 13d 21h).  Continuous atrial fibrillation, 48 to 184bpm, average 94bpm.  There were no pauses of greater than 3 seconds.  Rare premature ventricular contractions (<1%).  Symptom triggers correlated with AF with RVR or just AF with rare PVCs.    Cardiac event monitoring from 7/18/2024 to 8/16/2024 (monitored duration 28d 21h 22m).  Baseline rhythm was sinus rhythm 80bpm.    Reported heart rate range 50 to 172bpm, average 71bpm.  1 symptom trigger correlated to  "atrial fibrillation/atrial flutter with ventricular rates up to 172bpm.  1 episode of nonsustained ventricular tachycardia - 8 beats, 156bpm.  There were no pauses of over 3.0s.  Atrial fibrillation/flutter are incompletely characterized on this monitoring modality.  Supraventricular and ventricular ectopic beat frequency are not reported on this monitoring modality.     ECHO done 7/17/2024:  1. Normal left ventricular size and systolic performance with a visually  estimated ejection fraction of 60-65%.  2. There is trace aortic insufficiency.  3. Normal right ventricular size and systolic performance.    NM stress test done 9/5/2024:     Lexiscan stress ECG negative for ischemia.     The nuclear stress test is negative for inducible myocardial ischemia or infarction.     Stress to rest cavity ratio is 1.32.  Significance of this finding is unknown but may be related to atrial fibrillation with RVR given normal perfusion on imaging.     The left ventricular ejection fraction at stress is 68%.     There is no prior study for comparison.  Baseline electrocardiogram demonstrates atrial fibrillation.     I have reviewed and updated the patient's Past Medical History, Social History, Family History and Medication List.     Physical Examination  Review of Systems   Vitals: BP (!) 158/87 (BP Location: Left arm, Patient Position: Sitting, Cuff Size: Adult Regular)   Pulse 59   Resp 17   Ht 1.549 m (5' 1\")   Wt 58.1 kg (128 lb)   LMP  (LMP Unknown)   BMI 24.19 kg/m    BMI= Body mass index is 24.19 kg/m .  Wt Readings from Last 3 Encounters:   12/04/24 58.1 kg (128 lb)   10/31/24 58.5 kg (129 lb)   10/28/24 58.9 kg (129 lb 14.4 oz)       General Appearance:   Alert, well-appearing and in no acute distress.   HEENT: Atraumatic, normocephalic.  No scleral icterus, normal conjunctivae, EOMs intact, PERRL.  Mucous membranes pink and moist.     Chest/Lungs:   Chest symmetric, spine straight.  Respirations unlabored.  Lungs " are clear to auscultation.   Cardiovascular:   Regular rate and rhythm.  Normal first and second heart sounds with no murmurs, rubs, or gallops; radial pulses are intact, No edema.   Abdomen:  Soft, nondistended   Extremities: No cyanosis or clubbing.   Musculoskeletal: Moves all extremities.     Skin: Warm, dry, intact.    Neurologic: Mood and affect are appropriate.  Alert and oriented to person, place, time, and situation.     ROS: 10 point ROS neg other than the symptoms noted above in the HPI.        Medical History  Surgical History Family History Social History   Past Medical History:   Diagnosis Date     Atrial flutter (H)      Carpal tunnel syndrome of right wrist 01/14/2019     Cerebrovascular accident (CVA), unspecified mechanism (H) 07/16/2024     Hypertension      Paroxysmal atrial fibrillation (H) 09/10/2024     Past Surgical History:   Procedure Laterality Date     OTHER SURGICAL HISTORY      bowel obstruction pediatric     OTHER SURGICAL HISTORY      s/p partial colectomy as a child     OTHER SURGICAL HISTORY      right carpal  tunnel release     Family History   Problem Relation Age of Onset     Cerebrovascular Disease Mother      Leukemia Mother      Hypertension Mother      Colon Cancer Father      Atrial fibrillation Sister      Rheumatoid Arthritis Sister      Arrhythmia Sister      Breast Cancer Maternal Aunt         Social History     Socioeconomic History     Marital status:      Spouse name: Not on file     Number of children: Not on file     Years of education: Not on file     Highest education level: Not on file   Occupational History     Not on file   Tobacco Use     Smoking status: Never     Smokeless tobacco: Never   Vaping Use     Vaping status: Never Used   Substance and Sexual Activity     Alcohol use: Not Currently     Drug use: Never     Sexual activity: Not on file   Other Topics Concern     Parent/sibling w/ CABG, MI or angioplasty before 65F 55M? No   Social History  Narrative    Lives with  .   Likes to travel ,   Worked in  bank        Social Drivers of Health     Financial Resource Strain: Not on file   Food Insecurity: Not on file   Transportation Needs: Not on file   Physical Activity: Not on file   Stress: Not on file   Social Connections: Not on file   Interpersonal Safety: Low Risk  (10/31/2024)    Interpersonal Safety      Do you feel physically and emotionally safe where you currently live?: Yes      Within the past 12 months, have you been hit, slapped, kicked or otherwise physically hurt by someone?: No      Within the past 12 months, have you been humiliated or emotionally abused in other ways by your partner or ex-partner?: No   Housing Stability: Not on file           Medications  Allergies   Current Outpatient Medications   Medication Sig Dispense Refill     apixaban ANTICOAGULANT (ELIQUIS ANTICOAGULANT) 5 MG tablet Take 1 tablet (5 mg) by mouth 2 times daily. 180 tablet 3     cholecalciferol, vitamin D3, (VITAMIN D3) 2,000 unit capsule Take 1,000 Units by mouth daily.       flecainide (TAMBOCOR) 50 MG tablet Take 1 tablet (50 mg) by mouth 2 times daily. 180 tablet 3     losartan (COZAAR) 25 MG tablet Take 1 tablet (25 mg) by mouth daily. 90 tablet 3     metoprolol succinate ER (TOPROL XL) 50 MG 24 hr tablet Take 1 tablet (50 mg) by mouth daily. 90 tablet 1     No Known Allergies       Lab Results    Chemistry/lipid CBC Cardiac Enzymes/BNP/TSH/INR   Recent Labs   Lab Test 07/17/24 0423   CHOL 160   HDL 78   LDL 67   TRIG 73     Recent Labs   Lab Test 07/17/24  0423 05/07/21  1146 01/14/19  1059   LDL 67 94 98     Recent Labs   Lab Test 08/09/24  1442      POTASSIUM 4.4   CHLORIDE 106   CO2 26   GLC 90   BUN 22.9   CR 1.20*   GFRESTIMATED 47*   PHILLY 9.4     Recent Labs   Lab Test 08/09/24  1442 07/18/24  0716 07/17/24  0423   CR 1.20* 1.09* 0.99*     Recent Labs   Lab Test 07/17/24  0423   A1C 5.1      Recent Labs   Lab Test 07/17/24 0423   WBC 10.2  "  HGB 13.1   HCT 39.6   MCV 89        Recent Labs   Lab Test 07/17/24  0423 07/16/24  1734 05/07/21  1146   HGB 13.1 13.4 14.2    No results for input(s): \"TROPONINI\" in the last 35484 hours.  No results for input(s): \"BNP\", \"NTBNPI\", \"NTBNP\" in the last 97674 hours.  Recent Labs   Lab Test 08/09/24  1442   TSH 3.54     Recent Labs   Lab Test 07/17/24  0423 07/16/24  1734   INR 1.06 1.03        The longitudinal plan of care for the diagnosis(es)/condition(s) as documented were addressed during this visit. Due to the added complexity in care, I will continue to support Marifer in the subsequent management and with ongoing continuity of care.                                           Thank you for allowing me to participate in the care of your patient.      Sincerely,     CURTIS Omer CNP     [facility]  cc:   CURTIS Omer CNP  [address], [address]  [place] [address]      "

## 2024-12-04 NOTE — PATIENT INSTRUCTIONS
Marifer Temple,    It was a pleasure to see you today at the [facility].     My recommendations after this visit include:    Continue current medications  Consider ablation to treat A fib  (get dental work done first)    Call if you have recurrent A fib    Follow up in 3 months    Cleopatra Coelho CNP  [facility] [place], Electrophysiology  [phone]  EP nurses [phone]     Information on Atrial Fibrillation Ablations    What is Catheter Ablation?  A Catheter Ablation is a procedure that treats certain types of abnormal heart rhythms (arrhythmia). There are several components to the procedure, but the final purpose is target and destroy (ablate) small areas of your heart muscle that are causing the arrhythmia.     Why is an Ablations Done?  A catheter ablation is an effective way to treat some types of abnormal heart rhythms. An ablation is a relatively low risk procedure that may permanently cure your abnormal heart rhythm.  The ablation process damages the heart cells which results in scarring of that area. The scar is electrically inactive and can produce a permanent cure for the abnormal rhythm.  Ablation procedures can help avoid the need for rhythm medications and give patients the ability to return to their normal activity and live an active life. In patients that do not have symptoms, ablations are not typically done as there may still be an increased risk of stroke.    Why is Catheter Ablation Done?  Sometimes, the heart s electrical system does not work properly.  This can cause abnormal heart rhythms, called arrhythmias.  During an arrhythmia, the heart may beat too fast, too slowly, or irregularly.  Your doctor has recommended catheter ablation to treat a rapid (fast) heart rhythm, or tachycardia.      How Catheter Ablation Is Done  Catheter ablation uses thin, flexible wires called electrode catheters to find and destroy (ablate) problem cells.     Here is how the  procedure is done:  The pulmonary veins will be treated first. There are currently two tools used to ablate around the pulmonary veins.  Radiofrequency catheter will heat the tissue.  Cryo-balloon catheter will freeze the tissue.   Testing will be done to confirm that effective treatment has been delivered.   Further testing may be performed to see if a fib is still present or if some other rhythm problem such as atrial flutter is present. If an ongoing rhythm problem is discovered then further ablation can be done to isolate and destroy those areas responsible for the arrhythmia.     Your  Experience during Catheter Ablation    In most cases, catheter ablations are done in an electrophysiology (EP) lab.  The procedural area: You will be transferred back to the procedure room once you have been appropriately prepped by the nursing staff and you are ready for your ablation.   Sedation: You to be put completely asleep for your ablation using general anesthesia.  Inserting the catheters: You will have 3-4 catheters inserted into the veins. Catheter locations can include the shoulder, neck, and groins. Catheters are guided to the heart with the help of ultrasound and x-ray monitors.  Finishing up: When the procedure is finished, the catheters are taken out of your body. A special closure device or suture may be used to help seal these puncture sites. You re then taken to your room to rest, and will be cared for by a nurse during your recovery.    Risks and Complications  The risks of catheter ablation are fairly low compared to the benefits you receive. Possible risks and complications include:  Common (up to 10%)  Bleeding or bruising  Shortness of breath  Heartburn  Uncommon (< 1%)  Blood clots  A slow heart rhythm (requiring a permanent pacemaker)  Perforation of the heart muscle, blood vessel, or lung (may require an emergency procedure)  Stroke  Damage to a heart valve   Heart attack, also known as acute myocardial  infarction, or AMI   Death (extremely rare)    Before your Catheter Ablation  Before your catheter ablation, you will meet with the Electrophysiologist (specially trained heart doctor) who will do the procedure. The provider or a registered nurse will provide you with detailed instructions on how to prepare for this procedure, some of these instructions are listed below.  You will likely be told to stop or change your heart rhythm medications for a period of time before your ablation.   You may have testing done several days prior to your ablation or the morning of your ablation, such as an ECG, x-ray, blood tests, or echocardiogram.   You will not be allowed to eat or drink 8 hours before your ablation. You will be given further instructions by your physician or a registered nurse regarding the medication you will take the morning of your procedure.  You will need to arrange to have a  home from the hospital; you will not be permitted to drive after your procedure due to the sedation that you receive.   You are allowed to bring personal items and clothing to the hospital, but please refrain from bringing any valuables as the hospital is not responsible for any lost or stolen items.  You will need to bring a list of the names and dosages of the medication you are taking to the hospital.  It is important to mention to your doctor or registered nurse if you have any allergies, reactions to anesthesia, or have had history of bleeding problems.    Arriving at the Hospital the morning of your Catheter Ablation  Please check in at the time that was given to you by the , who scheduled your procedure. You do not need to arrive any earlier than the time that you were given.    When you arrive, you will be directed to the area where they will be performing your procedure. The doctor or registered nurse will meet with you prior to your ablation, this is a good time to ask questions and address any concerns you  may have. You will then be asked to sign the consent form for your ablation, if this has not already been done.    The nursing staff will begin to prepare you for your procedure:  A nurse will shave and cleanse the area where the ablation catheters will be placed. These areas are most commonly the left and right groin sites (the fold between your thigh and abdomen), and in some cases the chest, arm, and neck. This is done to reduce the risk of infection.    The nursing staff will start an intravenous (IV) line into a vein in your arm, which allows the staff to give you medication and sedatives to help you relax prior and during your ablation.  In some cases, the nursing staff will need to place a catheter that will drain urine from your bladder (Vincent Catheter), which is required due to the length and complexity of the ablation you are having.    After Catheter Ablation  Recovery immediately after your ablation in the hospital  After your catheter ablation procedure, you will be taken to a recovery room. After your ablation, you may be required to lay flat or be on bed rest. During this time, a nurse will monitor you, and you will be given medication to make you comfortable.     Going Home  When it is time to go home, your will need to have an adult family member or friend drive you. Most people can walk, climb stairs, and perform light activity soon after catheter ablation. You can most likely return to your full routine within a few days. However, you may be told to avoid running, heavy lifting, and other strenuous activities for a short time. Please make sure to follow any specific activity restrictions provided by the medical staff at the time of your discharge from the hospital.  Doctor's typically advise that you not drive until your post procedure assessment visit.   Avoid heavy physical activity and heavy lifting for several days after the procedure to allow your body to heal.  Ask your doctor when you can  expect to return to work.  Take your temperature and check your incision for signs of infection (redness, swelling, drainage, or warmth) every day for a week. It is normal to have a small bruise or lump where the catheter was inserted.  Take your medications exactly as directed. Do not skip doses or stop medication without consulting your physician prior.  Learn to take your own pulse and keep a record of your results.    Follow-Up  After your ablations you will have a follow up visit to see how you are doing, to assess your rhythm after your ablation, and to address any medication changes if necessary. In many cases, one ablation is enough to treat an arrhythmia. However, sometimes the problem returns or another is found. If this happens, you may need a second catheter ablation. Tell your healthcare provider if you have any new or returning symptoms.    Common Symptoms after Catheter Ablation  In the first few weeks after catheter ablation, you may feel mild chest fullness or aching. You may also feel as if your heart is skipping beats or your heartbeat may feel faster than normal. You may think that your heart rhythm problem is about to return. These sensations are normal and usually go away with time. Talk to your healthcare provider if you are concerned.      When to Call Your Doctor  Increased bleeding, bruising, or pain at the insertion site  Episodes of atrial fibrillation are common post procedure, call the clinic if episodes are lasting longer than 4-6 hours  Difficulty with your speech or walking, or any visual disturbance  Lightheaded, dizziness, or feeling faint  Shortness of breath or chest pain  Coldness, swelling, or numbness of the arm or leg near the insertion site  A bruise or lump at the insertion site that is larger than a walnut  A fever over 100 F

## 2025-04-07 ENCOUNTER — PATIENT OUTREACH (OUTPATIENT)
Dept: CARE COORDINATION | Facility: CLINIC | Age: 75
End: 2025-04-07
Payer: COMMERCIAL

## 2025-06-05 DIAGNOSIS — I48.0 PAROXYSMAL ATRIAL FIBRILLATION (H): ICD-10-CM

## 2025-06-05 RX ORDER — METOPROLOL SUCCINATE 50 MG/1
50 TABLET, EXTENDED RELEASE ORAL DAILY
Qty: 90 TABLET | Refills: 0 | Status: SHIPPED | OUTPATIENT
Start: 2025-06-05

## 2025-06-17 NOTE — PROGRESS NOTES
[facility]  Cardiac Electrophysiology  [address]  [place] [address]   Office: [phone]  Fax: [phone]     Patient: Marifer Temple   : 1950       CHIEF COMPLAINT/REASON FOR VISIT  Persistent atrial fibrillation      Assessment/Recommendations     Stage 3B/Persistent atrial fibrillation - symptomatic with fatigue and exertional intolerance, associated with increased risk of stroke, heart failure and mortality  [id number] 5  Flecainide (10/31/2024-2025)  We reviewed atrial fibrillation, managing stroke risk via anticoagulation or LAAO, managing heart failure risk, rate control, cardioversion, antiarrhythmic drug therapy, and catheter ablation.  We discussed atrial fibrillation ablation procedures, anticipated success rates, the potential need for re-do ablation vs addition of anti-arrhythmic drugs, procedural risks (including groin bleeding, vascular injury, tamponade, phrenic or esophageal injury, stroke, pulmonary vein stenosis) and recovery expectations.  She would tentatively prefer catheter ablation, though will consider options further  - our office will call in a few day to address any questions and possibly coordinate atrial fibrillation ablation  - PVI, general anesthesia, continue apixaban  - stop flecainide 50mg twice daily   - continue metoprolol XL 50mg daily  - continue apixaban 5mg twice daily  - continue use of Kardia  - we discussed the ongoing importance of lifestyle modification (maintaining a healthy weight, aerobic activity, sleep apnea diagnosis and management, alcohol avoidance) as part of a long term strategy for atrial fibrillation management  - the longitudinal plan of care was addressed during this visit. Due to the added complexity in care, our team will remain engaged subsequent management of this condition and ongoing continuity of care    Follow up: as above           History of Present Illness   Marifer Temple is a 75  year old female with persistent atrial fibrillation, CVA 7/16/2024 treated with tPA, HTN, CKD III, osteoporosis, referred by Cleopatra Coelho CNP for consultation regarding atrial fibrillation.    Mrs. Temple's atrial fibrillation history is as summarized below:  Symptoms: palpitations, exertional dyspnea  Diagnosis date: 7/2024 MCT following CVA - pAF progressing to persistent AF by 9/2024  Admissions/ER visits: none  Prior medical therapies: flecainide (10/31/2024-6/24/2025)  Prior DCCVs: 10/31/2024  Prior ablations: none  Percutaneous left atrial appendage occlusion: none    She noted symptomatic improvement after 10/31/2024 DCCV, though she notes ongoing exertional dyspnea and fatigue - she is back in atrial fibrillation today.  She has not had bleeding issues on apixaban.  She denies chest pain, syncope.  Her  Glenn also has atrial fibrillation. Her sister has atrial fibrillation.         Physical Examination  Review of Systems   VITALS: BP (!) 158/93 (BP Location: Right arm, Patient Position: Sitting, Cuff Size: Adult Regular)   Pulse 63   Resp 16   Wt 59.4 kg (131 lb)   LMP  (LMP Unknown)   BMI 24.75 kg/m    Wt Readings from Last 3 Encounters:   03/28/25 58.5 kg (129 lb)   12/04/24 58.1 kg (128 lb)   10/31/24 58.5 kg (129 lb)     CONSTITUTIONAL: well nourished, comfortable, no distress  EYES:  Conjunctivae pink, sclerae clear.    E/N/T:  Oral mucosa pink  RESPIRATORY:  Respiratory effort is normal  CARDIOVASCULAR:  irregular, normal S1 and S2  GASTROINTESTINAL:  Abdomen without masses or tenderness  EXTREMITIES:  No clubbing or cyanosis.    MUSCULOSKELETAL:  Overall grossly normal muscle strength  SKIN:  Overall, skin warm and dry, no lesions.  NEURO/PSYCH:  Oriented x 3 with normal affect.   Constitutional:  No weight loss or loss of appetite    Eyes:  No difficulty with vision, no double vision, no dry eyes  ENT:  No sore throat, difficulty swallowing; changes in hearing or tinnitus  Cardiovascular:  As detailed above  Respiratory:  No cough  Musculoskeletal  No joint pain, muscle aches  Neurologic:  No syncope, lightheadedness, fainting spells   Hematologic: No easy bruising, excessive bleeding tendency   Gastrointestinal:  No jaundice, abdominal pain or abdominal bloating  Genitourinary: No changes in urinary habits, no trouble urinating    Psychiatric: No anxiety or depression      Medical History  Surgical History   Past Medical History:   Diagnosis Date    Atrial flutter (H)     Carpal tunnel syndrome of right wrist 01/14/2019    Cerebrovascular accident (CVA), unspecified mechanism (H) 07/16/2024    Hypertension     Paroxysmal atrial fibrillation (H) 09/10/2024    Past Surgical History:   Procedure Laterality Date    OTHER SURGICAL HISTORY      bowel obstruction pediatric    OTHER SURGICAL HISTORY      s/p partial colectomy as a child    OTHER SURGICAL HISTORY      right carpal  tunnel release         Family History Social History   Family History   Problem Relation Age of Onset    Cerebrovascular Disease Mother     Leukemia Mother     Hypertension Mother     Colon Cancer Father     Atrial fibrillation Sister     Rheumatoid Arthritis Sister     Arrhythmia Sister     Breast Cancer Maternal Aunt         Social History     Tobacco Use    Smoking status: Never    Smokeless tobacco: Never   Vaping Use    Vaping status: Never Used   Substance Use Topics    Alcohol use: Not Currently    Drug use: Never         Medications  Allergies     Current Outpatient Medications:     apixaban ANTICOAGULANT (ELIQUIS ANTICOAGULANT) 5 MG tablet, Take 1 tablet (5 mg) by mouth 2 times daily., Disp: 180 tablet, Rfl: 3    cholecalciferol, vitamin D3, (VITAMIN D3) 2,000 unit capsule, Take 1,000 Units by mouth daily., Disp: , Rfl:     flecainide (TAMBOCOR) 50 MG tablet, Take 1 tablet (50 mg) by mouth 2 times daily., Disp: 180 tablet, Rfl: 3    losartan (COZAAR) 25 MG tablet, Take 1 tablet (25 mg) by mouth daily., Disp: 90 tablet,  "Rfl: 3    metoprolol succinate ER (TOPROL XL) 50 MG 24 hr tablet, TAKE 1 TABLET(50 MG) BY MOUTH DAILY, Disp: 90 tablet, Rfl: 0    Current Facility-Administered Medications:     denosumab (PROLIA) injection 60 mg, 60 mg, Subcutaneous, Once,    No Known Allergies       Lab Results    Chemistry CBC Cardiac Enzymes/BNP/TSH/INR   Recent Labs   Lab Test 08/09/24  1442      POTASSIUM 4.4   CHLORIDE 106   CO2 26   GLC 90   BUN 22.9   CR 1.20*   GFRESTIMATED 47*   PHILLY 9.4     Recent Labs   Lab Test 08/09/24  1442 07/18/24  0716 07/17/24  0423   CR 1.20* 1.09* 0.99*          Recent Labs   Lab Test 07/17/24 0423   WBC 10.2   HGB 13.1   HCT 39.6   MCV 89        Recent Labs   Lab Test 07/17/24  0423 07/16/24  1734 05/07/21  1146   HGB 13.1 13.4 14.2    No results for input(s): \"TROPONINI\" in the last 23001 hours.  No results for input(s): \"BNP\", \"NTBNPI\", \"NTBNP\" in the last 64499 hours.  Recent Labs   Lab Test 08/09/24  1442   TSH 3.54     Recent Labs   Lab Test 07/17/24  0423 07/16/24  1734   INR 1.06 1.03         Data Review    ECGs (tracings independently reviewed)  6/24/2025 - AF, 63bpm, borderline left axis deviation  10/31/2024 (post DCCV) - SR 60bpm, PACs, QTC 436ms  8/30/2024 - AF, 106bpm    Zio monitoring from 9/30/2024 to 10/14/2024 (duration 13d 21h).  Continuous atrial fibrillation, 48 to 184bpm, average 94bpm.  There were no pauses of greater than 3 seconds.  Rare premature ventricular contractions (<1%).  Symptom triggers correlated with AF with RVR or just AF with rare PVCs.     Cardiac event monitoring from 7/18/2024 to 8/16/2024 (monitored duration 28d 21h 22m).  Baseline rhythm was sinus rhythm 80bpm.    Reported heart rate range 50 to 172bpm, average 71bpm.  1 symptom trigger correlated to atrial fibrillation/atrial flutter with ventricular rates up to 172bpm.  1 episode of nonsustained ventricular tachycardia - 8 beats, 156bpm.  There were no pauses of over 3.0s.  Atrial fibrillation/flutter " are incompletely characterized on this monitoring modality.  Supraventricular and ventricular ectopic beat frequency are not reported on this monitoring modality.     7/17/2024 TTE  1. Normal left ventricular size and systolic performance with a visually  estimated ejection fraction of 60-65%.  2. There is trace aortic insufficiency.  3. Normal right ventricular size and systolic performance    9/5/2024 MPI    Lexiscan stress ECG negative for ischemia.    The nuclear stress test is negative for inducible myocardial ischemia or infarction.    Stress to rest cavity ratio is 1.32.  Significance of this finding is unknown but may be related to atrial fibrillation with RVR given normal perfusion on imaging.    The left ventricular ejection fraction at stress is 68%.    There is no prior study for comparison.  Baseline electrocardiogram demonstrates atrial fibrillation.        Cc: Cleopatra CARO, Siomara Ott MD Amila Dilusha William, MD  6/24/2025  1:25 PM

## 2025-06-24 ENCOUNTER — OFFICE VISIT (OUTPATIENT)
Dept: CARDIOLOGY | Facility: CLINIC | Age: 75
End: 2025-06-24
Payer: COMMERCIAL

## 2025-06-24 ENCOUNTER — DOCUMENTATION ONLY (OUTPATIENT)
Dept: CARDIOLOGY | Facility: CLINIC | Age: 75
End: 2025-06-24

## 2025-06-24 VITALS
WEIGHT: 131 LBS | DIASTOLIC BLOOD PRESSURE: 93 MMHG | HEART RATE: 63 BPM | RESPIRATION RATE: 16 BRPM | BODY MASS INDEX: 24.75 KG/M2 | SYSTOLIC BLOOD PRESSURE: 158 MMHG

## 2025-06-24 DIAGNOSIS — I48.19 PERSISTENT ATRIAL FIBRILLATION (H): Primary | ICD-10-CM

## 2025-06-24 LAB
ATRIAL RATE - MUSE: 264 BPM
DIASTOLIC BLOOD PRESSURE - MUSE: NORMAL MMHG
INTERPRETATION ECG - MUSE: NORMAL
P AXIS - MUSE: NORMAL DEGREES
PR INTERVAL - MUSE: NORMAL MS
QRS DURATION - MUSE: 96 MS
QT - MUSE: 426 MS
QTC - MUSE: 435 MS
R AXIS - MUSE: -27 DEGREES
SYSTOLIC BLOOD PRESSURE - MUSE: NORMAL MMHG
T AXIS - MUSE: 27 DEGREES
VENTRICULAR RATE- MUSE: 63 BPM

## 2025-06-24 PROCEDURE — 3080F DIAST BP >= 90 MM HG: CPT | Performed by: INTERNAL MEDICINE

## 2025-06-24 PROCEDURE — 3077F SYST BP >= 140 MM HG: CPT | Performed by: INTERNAL MEDICINE

## 2025-06-24 PROCEDURE — 99204 OFFICE O/P NEW MOD 45 MIN: CPT | Mod: 25 | Performed by: INTERNAL MEDICINE

## 2025-06-24 PROCEDURE — 93000 ELECTROCARDIOGRAM COMPLETE: CPT | Performed by: INTERNAL MEDICINE

## 2025-06-24 NOTE — LETTER
2025    Siomara Ott MD  [address] [address]    RE: Marifer DE LA ROSA Maverick       Dear Colleague,     I had the pleasure of seeing Marifer Temple in the [facility].     [facility]  Cardiac Electrophysiology  [address]  [place] [address]   Office: [phone]  Fax: [phone]     Patient: Marifer Temple   : 1950       CHIEF COMPLAINT/REASON FOR VISIT  Persistent atrial fibrillation      Assessment/Recommendations     Stage 3B/Persistent atrial fibrillation - symptomatic with fatigue and exertional intolerance, associated with increased risk of stroke, heart failure and mortality  [id number] 5  Flecainide (10/31/2024-2025)  We reviewed atrial fibrillation, managing stroke risk via anticoagulation or LAAO, managing heart failure risk, rate control, cardioversion, antiarrhythmic drug therapy, and catheter ablation.  We discussed atrial fibrillation ablation procedures, anticipated success rates, the potential need for re-do ablation vs addition of anti-arrhythmic drugs, procedural risks (including groin bleeding, vascular injury, tamponade, phrenic or esophageal injury, stroke, pulmonary vein stenosis) and recovery expectations.  She would tentatively prefer catheter ablation, though will consider options further  - our office will call in a few day to address any questions and possibly coordinate atrial fibrillation ablation  - PVI, general anesthesia, continue apixaban  - stop flecainide 50mg twice daily   - continue metoprolol XL 50mg daily  - continue apixaban 5mg twice daily  - continue use of Kardia  - we discussed the ongoing importance of lifestyle modification (maintaining a healthy weight, aerobic activity, sleep apnea diagnosis and management, alcohol avoidance) as part of a long term strategy for atrial fibrillation management  - the longitudinal plan of care was addressed during this visit. Due to the added  complexity in care, our team will remain engaged subsequent management of this condition and ongoing continuity of care    Follow up: as above           History of Present Illness   Marifer Temple is a 75 year old female with persistent atrial fibrillation, CVA 7/16/2024 treated with tPA, HTN, CKD III, osteoporosis, referred by Cleopatra Coelho CNP for consultation regarding atrial fibrillation.    Mrs. Temple's atrial fibrillation history is as summarized below:  Symptoms: palpitations, exertional dyspnea  Diagnosis date: 7/2024 MCT following CVA - pAF progressing to persistent AF by 9/2024  Admissions/ER visits: none  Prior medical therapies: flecainide (10/31/2024-6/24/2025)  Prior DCCVs: 10/31/2024  Prior ablations: none  Percutaneous left atrial appendage occlusion: none    She noted symptomatic improvement after 10/31/2024 DCCV, though she notes ongoing exertional dyspnea and fatigue - she is back in atrial fibrillation today.  She has not had bleeding issues on apixaban.  She denies chest pain, syncope.  Her  Glenn also has atrial fibrillation. Her sister has atrial fibrillation.         Physical Examination  Review of Systems   VITALS: BP (!) 158/93 (BP Location: Right arm, Patient Position: Sitting, Cuff Size: Adult Regular)   Pulse 63   Resp 16   Wt 59.4 kg (131 lb)   LMP  (LMP Unknown)   BMI 24.75 kg/m    Wt Readings from Last 3 Encounters:   03/28/25 58.5 kg (129 lb)   12/04/24 58.1 kg (128 lb)   10/31/24 58.5 kg (129 lb)     CONSTITUTIONAL: well nourished, comfortable, no distress  EYES:  Conjunctivae pink, sclerae clear.    E/N/T:  Oral mucosa pink  RESPIRATORY:  Respiratory effort is normal  CARDIOVASCULAR:  irregular, normal S1 and S2  GASTROINTESTINAL:  Abdomen without masses or tenderness  EXTREMITIES:  No clubbing or cyanosis.    MUSCULOSKELETAL:  Overall grossly normal muscle strength  SKIN:  Overall, skin warm and dry, no lesions.  NEURO/PSYCH:  Oriented x 3 with normal affect.    Constitutional:  No weight loss or loss of appetite    Eyes:  No difficulty with vision, no double vision, no dry eyes  ENT:  No sore throat, difficulty swallowing; changes in hearing or tinnitus  Cardiovascular: As detailed above  Respiratory:  No cough  Musculoskeletal  No joint pain, muscle aches  Neurologic:  No syncope, lightheadedness, fainting spells   Hematologic: No easy bruising, excessive bleeding tendency   Gastrointestinal:  No jaundice, abdominal pain or abdominal bloating  Genitourinary: No changes in urinary habits, no trouble urinating    Psychiatric: No anxiety or depression      Medical History  Surgical History   Past Medical History:   Diagnosis Date     Atrial flutter (H)      Carpal tunnel syndrome of right wrist 01/14/2019     Cerebrovascular accident (CVA), unspecified mechanism (H) 07/16/2024     Hypertension      Paroxysmal atrial fibrillation (H) 09/10/2024    Past Surgical History:   Procedure Laterality Date     OTHER SURGICAL HISTORY      bowel obstruction pediatric     OTHER SURGICAL HISTORY      s/p partial colectomy as a child     OTHER SURGICAL HISTORY      right carpal  tunnel release         Family History Social History   Family History   Problem Relation Age of Onset     Cerebrovascular Disease Mother      Leukemia Mother      Hypertension Mother      Colon Cancer Father      Atrial fibrillation Sister      Rheumatoid Arthritis Sister      Arrhythmia Sister      Breast Cancer Maternal Aunt         Social History     Tobacco Use     Smoking status: Never     Smokeless tobacco: Never   Vaping Use     Vaping status: Never Used   Substance Use Topics     Alcohol use: Not Currently     Drug use: Never         Medications  Allergies     Current Outpatient Medications:      apixaban ANTICOAGULANT (ELIQUIS ANTICOAGULANT) 5 MG tablet, Take 1 tablet (5 mg) by mouth 2 times daily., Disp: 180 tablet, Rfl: 3     cholecalciferol, vitamin D3, (VITAMIN D3) 2,000 unit capsule, Take 1,000 Units  "by mouth daily., Disp: , Rfl:      flecainide (TAMBOCOR) 50 MG tablet, Take 1 tablet (50 mg) by mouth 2 times daily., Disp: 180 tablet, Rfl: 3     losartan (COZAAR) 25 MG tablet, Take 1 tablet (25 mg) by mouth daily., Disp: 90 tablet, Rfl: 3     metoprolol succinate ER (TOPROL XL) 50 MG 24 hr tablet, TAKE 1 TABLET(50 MG) BY MOUTH DAILY, Disp: 90 tablet, Rfl: 0    Current Facility-Administered Medications:      denosumab (PROLIA) injection 60 mg, 60 mg, Subcutaneous, Once,    No Known Allergies       Lab Results    Chemistry CBC Cardiac Enzymes/BNP/TSH/INR   Recent Labs   Lab Test 08/09/24  1442      POTASSIUM 4.4   CHLORIDE 106   CO2 26   GLC 90   BUN 22.9   CR 1.20*   GFRESTIMATED 47*   PHILLY 9.4     Recent Labs   Lab Test 08/09/24  1442 07/18/24  0716 07/17/24  0423   CR 1.20* 1.09* 0.99*          Recent Labs   Lab Test 07/17/24  0423   WBC 10.2   HGB 13.1   HCT 39.6   MCV 89        Recent Labs   Lab Test 07/17/24  0423 07/16/24  1734 05/07/21  1146   HGB 13.1 13.4 14.2    No results for input(s): \"TROPONINI\" in the last 84014 hours.  No results for input(s): \"BNP\", \"NTBNPI\", \"NTBNP\" in the last 83211 hours.  Recent Labs   Lab Test 08/09/24  1442   TSH 3.54     Recent Labs   Lab Test 07/17/24  0423 07/16/24  1734   INR 1.06 1.03         Data Review    ECGs (tracings independently reviewed)  6/24/2025 - AF, 63bpm, borderline left axis deviation  10/31/2024 (post DCCV) - SR 60bpm, PACs, QTC 436ms  8/30/2024 - AF, 106bpm    Zio monitoring from 9/30/2024 to 10/14/2024 (duration 13d 21h).  Continuous atrial fibrillation, 48 to 184bpm, average 94bpm.  There were no pauses of greater than 3 seconds.  Rare premature ventricular contractions (<1%).  Symptom triggers correlated with AF with RVR or just AF with rare PVCs.     Cardiac event monitoring from 7/18/2024 to 8/16/2024 (monitored duration 28d 21h 22m).  Baseline rhythm was sinus rhythm 80bpm.    Reported heart rate range 50 to 172bpm, average 71bpm.  1 " symptom trigger correlated to atrial fibrillation/atrial flutter with ventricular rates up to 172bpm.  1 episode of nonsustained ventricular tachycardia - 8 beats, 156bpm.  There were no pauses of over 3.0s.  Atrial fibrillation/flutter are incompletely characterized on this monitoring modality.  Supraventricular and ventricular ectopic beat frequency are not reported on this monitoring modality.     7/17/2024 TTE  1. Normal left ventricular size and systolic performance with a visually  estimated ejection fraction of 60-65%.  2. There is trace aortic insufficiency.  3. Normal right ventricular size and systolic performance    9/5/2024 MPI     Lexiscan stress ECG negative for ischemia.     The nuclear stress test is negative for inducible myocardial ischemia or infarction.     Stress to rest cavity ratio is 1.32.  Significance of this finding is unknown but may be related to atrial fibrillation with RVR given normal perfusion on imaging.     The left ventricular ejection fraction at stress is 68%.     There is no prior study for comparison.  Baseline electrocardiogram demonstrates atrial fibrillation.        Cc: Cleopatra Coelho CN{, Siomara Ott MD Amila Dilusha William, MD  6/24/2025  1:25 PM        Thank you for allowing me to participate in the care of your patient.      Sincerely,     Pedro Phelan MD     [facility]  cc:   CURTIS Omer CNP  [address], [address]  [place] [address]

## 2025-06-24 NOTE — PROGRESS NOTES
Noted.  PC note created, and postponed to contact pt per request.  Postponed PC note to Friday.  Shereen Rodríguez RN  6/24/2025 2:05 PM     ----- Message -----  From: Pedro Phelan MD  Sent: 6/24/2025   2:02 PM CDT  To: [facility] Ep Support Pool - [facility],    Can we please check in with Mrs. Temple over the next few days re: AF management options.      Thank you,  Venessa

## 2025-06-24 NOTE — PATIENT INSTRUCTIONS
[facility]  Cardiac Electrophysiology  [address]  [place] [address]   Office: [phone]  Fax: [phone]     Thank you for seeing us in clinic today - it is a pleasure to be a part of your care team.  Below is a summary of our plan from today's visit.       You have atrial fibrillation.  We reviewed atrial fibrillation, treatment options (ablation vs antiarrhythmic drug therapy), and long term stroke risk prevention (blood thinner vs Watchman device).    You are back in atrial fibrillation today.  We will plan for the following:  - our office will call in a few day to address any questions and possibly coordinate atrial fibrillation ablation  - stop flecainide 50mg twice daily   - continue metoprolol XL 50mg daily  - continue apixaban 5mg twice daily  - continue use of Kardia     Please do not hesitate to be in touch with our office at [phone] with any questions that may arise.       Thank you for trusting us with your care,    Pedro Phelan MD  Clinical Cardiac Electrophysiology  [facility]  [address] [place] [address]   Office: [phone]  Fax: [phone]        ATRIAL FIBRILLATION: Patient Information    What is atrial fibrillation?  Atrial fibrillation (AF, A-fib) is a common heart rhythm problem (arrhythmia) occurring within the upper chambers of the heart (the atria).  In normal rhythm, the upper and lower chambers of the heart are electrically driven to contract in a coordinated sequence.  In atrial fibrillation, the atria lose their ability to contract because of rapid and chaotic electrical activity.  The lower chambers of the heart (the ventricles) continue to pump blood throughout the body, though with irregular and often faster rate due to the chaotic activity within the atria.        How do I know if I have atrial fibrillation?   Some people may feel their heart beating faster, harder, or  irregularly while in atrial fibrillation.  Others may be lightheaded, fatigued, feel weak or tired or become more short of breath especially with activities.  Some patients have no symptoms at all.  Atrial fibrillation may be found due to an irregular pulse or on an electrocardiogram (ECG). Atrial fibrillation can start and stop on its own, and episodes can last from seconds to several months.      How common is atrial fibrillation?   An estimated 3-6 million people in the [place] have atrial fibrillation.  Atrial fibrillation is a common heart rhythm problem for older persons, affecting as estimated 12-15% of people over the age of 65 years of age.    What causes atrial fibrillation?   Age is the most important risk factor for atrial fibrillation.  Atrial fibrillation is more common in people with other heart disease, high blood pressure, diabetes, obesity, sleep apnea and in people who regularly consume alcohol.  Surgery, lung disease, or thyroid problems can lead to atrial fibrillation.  Atrial fibrillation has multiple possible causes, and in most cases a single cause cannot be found.  Atrial fibrillation is a progressive condition, usually starting with at an early stage with short and infrequent episodes.  In later stages of disease, more frequent and longer lasting episodes of atrial fibrillation occur, ultimately culminating in episodes which do not spontaneously terminate.  Generally, more enlargement and scarring within the upper chambers of the heart is observed as atrial fibrillation progresses from early to late-stage disease.    How is atrial fibrillation diagnosed and evaluated?    Because of its start-stop nature, atrial fibrillation can be challenging to diagnose.  Atrial fibrillation is most commonly diagnosed via cardiac rhythm recordings - either an ECG or wearable cardiac rhythm monitor.  For patients with pacemakers, defibrillators or implantable loop recorders, atrial fibrillation may be  recorded via these devices.  Recently, commercially available devices (eg. Apple Watch, [employer] device, certain FitBit devices, others) can allow patients to take 30 second cardiac rhythm recordings which may document atrial fibrillation.  Once atrial fibrillation is diagnosed, additional tests include blood tests and an echocardiogram.  The echocardiogram uses ultrasound to look at your heart to assess your cardiac function and evaluate for other heart disease.  Additional evaluation may include CT or MRI studies.    Is atrial fibrillation dangerous?   Atrial fibrillation is not usually a life-threatening arrhythmia.  The most serious consequences of atrial fibrillation including stroke and worsening of overall cardiac function.  While in atrial fibrillation, the upper cardiac chambers do not contract normally, resulting in slower blood flow and increased risk of clot formation.  If this blood clot becomes detached from the heart a stroke can occur.  Unfortunately, stroke can be the first sign of atrial fibrillation for some people.  With a stroke, you may notice abnormal sensation, weakness on one side of the body or face, changes in your vision or speech.  If you have any of these signs, you should contact EMS and be evaluated in an emergency room as soon as possible.      How is atrial fibrillation treated?     Several treatment options exist for suppressing atrial fibrillation - however, it is not an easily curable arrhythmia.  The first goal in managing atrial fibrillation is to minimize stroke risk.  The second goal is to improve symptoms associated with atrial fibrillation.  Finally, in patients with reduced cardiac function, maintaining normal rhythm can help improve cardiac function.      Blood thinners are used to reduce the risk of stroke in patients with high estimated stroke risk related to atrial fibrillation.  For patients at higher risk of bleeding related to blood thinner use, implantable devices  may be an option to reduce stroke risk without the need for long term blood thinner use.      Atrial fibrillation can be managed via two strategies: rate control and rhythm control.  In a rate control strategy, continued atrial fibrillation is accepted and medications (eg. beta-blockers or calcium channel blockers) are used to control the lower chamber rate.  In a rhythm control strategy, anti-arrhythmic medications or catheter ablation are used to maintain normal cardiac rhythm and slow disease progression by suppressing atrial fibrillation.  A procedure called a cardioversion, in which an electric shock is delivered through patches placed on the chest wall while under deep sedation, can be performed to temporarily restore normal cardiac rhythm, though does not address the chance of atrial fibrillation recurrence.  Treatments are more effective for earlier rather than later stage atrial fibrillation.  Lifestyle modifications (maintaining a healthy weight, aerobic exercise, diagnosing and treating sleep apnea, and minimizing alcohol intake) are important elements of atrial fibrillation rhythm control.     What is catheter ablation for atrial fibrillation?  Cardiac catheter ablation is a commonly performed, minimally invasive procedure performed by a cardiac electrophysiologist to treat many different cardiac rhythm abnormalities.  During catheter ablation, long, thin, flexible tubes are advanced into the heart via small sheaths inserted into the femoral veins and thermal energy (either heating or cooling) is applied within the heart to disrupt abnormal electrical activity.  Atrial fibrillation ablation is performed under general anesthesia, with procedures generally taking approximately 2-3 hours.  Patients are typically observed for 3-5 hours after the ablation, and in most cases can be discharged home the same day.  Atrial fibrillation ablation is associated with better outcomes (mortality, cardiovascular  hospitalizations, atrial arrhythmia recurrences) compared to antiarrhythmic drug therapy.  However, atrial fibrillation recurrences are not uncommon, and repeat catheter ablation may be offered.  Your electrophysiology team can review atrial fibrillation ablation, anticipated success rates, risks, and recovery expectations with you.    What are anti-arrhythmic medications?  Anti-arrhythmic medications are specialized drugs which alter cardiac electrical functioning to suppress arrhythmias.  There are several anti-arrhythmic medications available, each with its own success rate and side effects.  Some anti-arrhythmic medications are less effective though safer to use, others are more effective though have serious potential toxicities.  Atrial fibrillation recurrences are common and may require dose adjustment or change in antiarrhythmic therapy.  Your electrophysiology team will carefully consider which medication would be the best and safest for your particular case.      Can I live a normal life?    The goal of atrial fibrillation management is for patients to live normal lives without being limited by symptoms related to atrial fibrillation.    Are any additional educational resources available?  There are a number of excellent atrial fibrillation education resources available to you online.  A few options you may wish to review include:  hrsonline.org/guide-atrial-fibrillation  afibmatters.org  getsmartaboutafib.com  stopaf.com    What comes next?    Consider your management options and let us know how we can help in your decision process.  Please take medications as they have been prescribed.  You should also get any tests that may have been ordered for you.      When to Call Your Doctor or seek emergency care:  Call your doctor or seek emergency care if you have any significant changes with the following:  Weakness  Dizziness  Fainting  Fatigue  Shortness of breath  Chest pain with increased activity  If you  are concerned that your heart rate is too fast or too slow  Bleeding that does not stop in 10 minutes  Coughing or throwing up blood  Bloody diarrhea or bleeding hemorrhoids  Dark-colored urine or black stool  Allergic reactions:  Rash  Itching  Swelling  Trouble breathing or swallowing      Please call the [facility] at [phone] if you have concerns about your symptoms, your medicines, or your follow-up appointments.

## 2025-06-30 ENCOUNTER — DOCUMENTATION ONLY (OUTPATIENT)
Dept: CARDIOLOGY | Facility: CLINIC | Age: 75
End: 2025-06-30
Payer: COMMERCIAL

## 2025-06-30 DIAGNOSIS — I48.19 PERSISTENT ATRIAL FIBRILLATION (H): Primary | ICD-10-CM

## 2025-06-30 RX ORDER — LIDOCAINE 40 MG/G
CREAM TOPICAL
OUTPATIENT
Start: 2025-06-30

## 2025-06-30 RX ORDER — FENTANYL CITRATE 50 UG/ML
25 INJECTION, SOLUTION INTRAMUSCULAR; INTRAVENOUS
Refills: 0 | OUTPATIENT
Start: 2025-06-30

## 2025-06-30 RX ORDER — SODIUM CHLORIDE 9 MG/ML
100 INJECTION, SOLUTION INTRAVENOUS CONTINUOUS
OUTPATIENT
Start: 2025-06-30

## 2025-06-30 NOTE — PROGRESS NOTES
PVI  Order Case Req Y  Order Set Y Imaging Order None     AC Eliquis-CONTINUE   AAD Metoprolol-Continue   PPI/H2 Blocker PPI not needed PFA   Diuretics None   DM/GLP-1 DM Meds- None  GLP-1- None  SGLT2- None   ED Meds  None     Marifer Temple, 1950, [id number]  Home:[phone] (home) Cell:[phone] (mobile)  Emergency Contact: Mac Temple [phone]  PCP: Siomara Ott, [phone]    Important patient information for [facility]/Cath Lab staff : None    [facility] EP Cath Lab Procedure Order   Ablation Type:  Atrial Fibrillation (Persistent)  Case Request: PFA  Ordering Provider: Dr Phelan  Date Ordered and Prepped: 6/30/2025 Mira Youssef RN    Scheduling Information:  Anticipated Case Duration:  Standard ( Case per day SA 2:1, DW 5:1, KA 3:1)   Scheduling Timeframe:  Next Available  Scheduling Restrictions: None  Scheduling Contact: Pt is aware of scheduling limitations at this time due to schedule, please call pt when schedule is available  EP RN Follow Up Apt: Schedule EP RN PC visit 3-4 days s/p PVI  MD Preference: Scheduling with ordering provider  Current Device/Device Co Needed for Procedure: None NoneNone  Pre-Procedural Testing needed: None  Mapping System Required:  Carto (Dr Phelan and Dr Ordonez)  ICE Needed:  Yes  Anesthesia:General Anesthesia    [facility] EP Cath Lab Prep   H&P:  Schedule H&P with EP ZEUS, RN Teach, and Labs within 30 days of PVI  Pre-op Labs: CBC, BMP, Beta HcG if appropriate, and INR if on Warfarin will be ordered AM of procedure, if not completed at pre-op H&P within 7 days of procedure.  T&S Pre-Procedure Review: Does not need for PVI procedures  Medical Records Pertinent for Procedure:  None  Iodinated Contrast Dye Allergies (Does not include Shellfish, Egg, and/or Iodine Allergy): NA  Lidocaine and/or Chlorhexidine Allergies (scheduling to include alert on snapboard): None  GLP-1 Protocol: If on Dulaglutide (Trulicity) (weekly)- Injection hold 7 days prior to procedure  ,  Exenatide extended release (Bydureon bcise) (weekly)- Injection hold 7 days prior to procedure, Exenatide (Byetta) (twice daily)- Oral Tablet hold day prior and morning of procedure and for Injection hold 7 days prior to procedure, Semaglutide (Ozempic) (weekly)- Injection and Oral hold 7 days prior to procedure, Liraglutide (Victoza, Saxenda) (daily)- Injection hold day prior and morning of procedure  SGLT2 Inhibitors Protocol: ertugliflozin (Steglatro)- Hold 4 days prior to procedure for risk of ketoacidosis. canagliflozin (Invokana), dapagliflozin (Farxiga), and empagliflozin (Jardiance)- Hold 3 days prior to procedure for risk of ketoacidosis. If pt taking for pulmonary HTN, continue.   ED Meds Protocol:  If taking Sildenafil (Viagra), Tadalafill (Cialis, Adcirca), or Vardnafil (Levitra, Staxyn)- Hold 3 days prior to procedure  Follow Up S/P: EP RN 3-4 PC Visit and EP ZEUS 6wk (To be scheduled at time of case scheduling)    No Known Allergies    Current Outpatient Medications:     apixaban ANTICOAGULANT (ELIQUIS ANTICOAGULANT) 5 MG tablet, Take 1 tablet (5 mg) by mouth 2 times daily., Disp: 180 tablet, Rfl: 3    cholecalciferol, vitamin D3, (VITAMIN D3) 2,000 unit capsule, Take 1,000 Units by mouth daily., Disp: , Rfl:     losartan (COZAAR) 25 MG tablet, Take 1 tablet (25 mg) by mouth daily., Disp: 90 tablet, Rfl: 3    metoprolol succinate ER (TOPROL XL) 50 MG 24 hr tablet, TAKE 1 TABLET(50 MG) BY MOUTH DAILY, Disp: 90 tablet, Rfl: 0    Current Facility-Administered Medications:     denosumab (PROLIA) injection 60 mg, 60 mg, Subcutaneous, Once,     Documentation Date:6/30/2025 10:07 AM  Mira Youssef RN

## 2025-07-01 DIAGNOSIS — I48.19 PERSISTENT ATRIAL FIBRILLATION (H): Primary | ICD-10-CM

## 2025-07-31 ENCOUNTER — PREP FOR PROCEDURE (OUTPATIENT)
Dept: CARDIOLOGY | Facility: CLINIC | Age: 75
End: 2025-07-31
Payer: COMMERCIAL

## 2025-07-31 RX ORDER — LIDOCAINE 40 MG/G
CREAM TOPICAL
Status: CANCELLED | OUTPATIENT
Start: 2025-07-31

## 2025-07-31 RX ORDER — SODIUM CHLORIDE 9 MG/ML
100 INJECTION, SOLUTION INTRAVENOUS CONTINUOUS
Status: CANCELLED | OUTPATIENT
Start: 2025-07-31

## 2025-07-31 RX ORDER — DEXMEDETOMIDINE HYDROCHLORIDE 4 UG/ML
.1-1.5 INJECTION, SOLUTION INTRAVENOUS CONTINUOUS
Status: CANCELLED | OUTPATIENT
Start: 2025-07-31

## 2025-07-31 RX ORDER — FENTANYL CITRATE 50 UG/ML
25 INJECTION, SOLUTION INTRAMUSCULAR; INTRAVENOUS
Refills: 0 | Status: CANCELLED | OUTPATIENT
Start: 2025-07-31

## 2025-08-04 ENCOUNTER — RESULTS FOLLOW-UP (OUTPATIENT)
Dept: CARDIOLOGY | Facility: CLINIC | Age: 75
End: 2025-08-04

## 2025-08-04 ENCOUNTER — APPOINTMENT (OUTPATIENT)
Dept: CARDIOLOGY | Facility: CLINIC | Age: 75
End: 2025-08-04
Payer: COMMERCIAL

## 2025-08-04 ENCOUNTER — OFFICE VISIT (OUTPATIENT)
Dept: CARDIOLOGY | Facility: CLINIC | Age: 75
End: 2025-08-04
Payer: COMMERCIAL

## 2025-08-04 ENCOUNTER — LAB (OUTPATIENT)
Dept: CARDIOLOGY | Facility: CLINIC | Age: 75
End: 2025-08-04
Payer: COMMERCIAL

## 2025-08-04 VITALS
BODY MASS INDEX: 24.73 KG/M2 | HEIGHT: 61 IN | DIASTOLIC BLOOD PRESSURE: 88 MMHG | WEIGHT: 131 LBS | HEART RATE: 68 BPM | RESPIRATION RATE: 16 BRPM | SYSTOLIC BLOOD PRESSURE: 148 MMHG

## 2025-08-04 DIAGNOSIS — I48.19 PERSISTENT ATRIAL FIBRILLATION (H): Primary | ICD-10-CM

## 2025-08-04 DIAGNOSIS — I48.19 PERSISTENT ATRIAL FIBRILLATION (H): ICD-10-CM

## 2025-08-04 DIAGNOSIS — I10 ESSENTIAL HYPERTENSION: ICD-10-CM

## 2025-08-04 DIAGNOSIS — I48.92 ATRIAL FLUTTER, UNSPECIFIED TYPE (H): ICD-10-CM

## 2025-08-04 LAB
ANION GAP SERPL CALCULATED.3IONS-SCNC: 6 MMOL/L (ref 7–15)
BUN SERPL-MCNC: 23.7 MG/DL (ref 8–23)
CALCIUM SERPL-MCNC: 9.8 MG/DL (ref 8.8–10.4)
CHLORIDE SERPL-SCNC: 106 MMOL/L (ref 98–107)
CREAT SERPL-MCNC: 1.21 MG/DL (ref 0.51–0.95)
EGFRCR SERPLBLD CKD-EPI 2021: 47 ML/MIN/1.73M2
ERYTHROCYTE [DISTWIDTH] IN BLOOD BY AUTOMATED COUNT: 13.6 % (ref 10–15)
GLUCOSE SERPL-MCNC: 83 MG/DL (ref 70–99)
HCO3 SERPL-SCNC: 29 MMOL/L (ref 22–29)
HCT VFR BLD AUTO: 44.4 % (ref 35–47)
HGB BLD-MCNC: 14.2 G/DL (ref 11.7–15.7)
MCH RBC QN AUTO: 29.1 PG (ref 26.5–33)
MCHC RBC AUTO-ENTMCNC: 32 G/DL (ref 31.5–36.5)
MCV RBC AUTO: 91 FL (ref 78–100)
PLATELET # BLD AUTO: 182 10E3/UL (ref 150–450)
POTASSIUM SERPL-SCNC: 5.5 MMOL/L (ref 3.4–5.3)
RBC # BLD AUTO: 4.88 10E6/UL (ref 3.8–5.2)
SODIUM SERPL-SCNC: 141 MMOL/L (ref 135–145)
WBC # BLD AUTO: 7 10E3/UL (ref 4–11)

## 2025-08-04 PROCEDURE — 3079F DIAST BP 80-89 MM HG: CPT | Performed by: NURSE PRACTITIONER

## 2025-08-04 PROCEDURE — 93000 ELECTROCARDIOGRAM COMPLETE: CPT | Performed by: INTERNAL MEDICINE

## 2025-08-04 PROCEDURE — 3077F SYST BP >= 140 MM HG: CPT | Performed by: NURSE PRACTITIONER

## 2025-08-04 PROCEDURE — 85027 COMPLETE CBC AUTOMATED: CPT

## 2025-08-04 PROCEDURE — G2211 COMPLEX E/M VISIT ADD ON: HCPCS | Performed by: NURSE PRACTITIONER

## 2025-08-04 PROCEDURE — 80048 BASIC METABOLIC PNL TOTAL CA: CPT

## 2025-08-04 PROCEDURE — 36415 COLL VENOUS BLD VENIPUNCTURE: CPT

## 2025-08-04 PROCEDURE — 99214 OFFICE O/P EST MOD 30 MIN: CPT | Performed by: NURSE PRACTITIONER

## 2025-08-05 ENCOUNTER — ANESTHESIA EVENT (OUTPATIENT)
Dept: CARDIOLOGY | Facility: HOSPITAL | Age: 75
End: 2025-08-05
Payer: COMMERCIAL

## 2025-08-05 ENCOUNTER — PREP FOR PROCEDURE (OUTPATIENT)
Dept: CARDIOLOGY | Facility: CLINIC | Age: 75
End: 2025-08-05
Payer: COMMERCIAL

## 2025-08-05 DIAGNOSIS — I10 ESSENTIAL HYPERTENSION: Primary | ICD-10-CM

## 2025-08-05 DIAGNOSIS — E87.5 HYPERKALEMIA: ICD-10-CM

## 2025-08-05 RX ORDER — FUROSEMIDE 20 MG/1
20 TABLET ORAL DAILY
Qty: 90 TABLET | Refills: 3 | Status: SHIPPED | OUTPATIENT
Start: 2025-08-05

## 2025-08-06 ENCOUNTER — ANESTHESIA (OUTPATIENT)
Dept: CARDIOLOGY | Facility: HOSPITAL | Age: 75
End: 2025-08-06
Payer: COMMERCIAL

## 2025-08-06 ENCOUNTER — HOSPITAL ENCOUNTER (OUTPATIENT)
Facility: HOSPITAL | Age: 75
Discharge: HOME | End: 2025-08-06
Attending: INTERNAL MEDICINE | Admitting: INTERNAL MEDICINE
Payer: COMMERCIAL

## 2025-08-06 VITALS
WEIGHT: 131 LBS | SYSTOLIC BLOOD PRESSURE: 145 MMHG | HEART RATE: 55 BPM | HEIGHT: 61 IN | OXYGEN SATURATION: 95 % | BODY MASS INDEX: 24.73 KG/M2 | RESPIRATION RATE: 17 BRPM | TEMPERATURE: 98 F | DIASTOLIC BLOOD PRESSURE: 73 MMHG

## 2025-08-06 DIAGNOSIS — E87.5 HYPERKALEMIA: ICD-10-CM

## 2025-08-06 DIAGNOSIS — I10 ESSENTIAL HYPERTENSION: ICD-10-CM

## 2025-08-06 DIAGNOSIS — I48.19 PERSISTENT ATRIAL FIBRILLATION (H): ICD-10-CM

## 2025-08-06 LAB
ACT BLD: 385 SECONDS (ref 74–150)
ACT BLD: 410 SECONDS (ref 74–150)
ACT BLD: 519 SECONDS (ref 74–150)
ANION GAP SERPL CALCULATED.3IONS-SCNC: 12 MMOL/L (ref 7–15)
ATRIAL RATE - MUSE: 62 BPM
ATRIAL RATE - MUSE: NORMAL BPM
BUN SERPL-MCNC: 30.1 MG/DL (ref 8–23)
CALCIUM SERPL-MCNC: 9.6 MG/DL (ref 8.8–10.4)
CHLORIDE SERPL-SCNC: 103 MMOL/L (ref 98–107)
CREAT SERPL-MCNC: 1.67 MG/DL (ref 0.51–0.95)
DIASTOLIC BLOOD PRESSURE - MUSE: NORMAL MMHG
DIASTOLIC BLOOD PRESSURE - MUSE: NORMAL MMHG
EGFRCR SERPLBLD CKD-EPI 2021: 32 ML/MIN/1.73M2
ERYTHROCYTE [DISTWIDTH] IN BLOOD BY AUTOMATED COUNT: 13.5 % (ref 10–15)
GLUCOSE SERPL-MCNC: 90 MG/DL (ref 70–99)
HCO3 SERPL-SCNC: 23 MMOL/L (ref 22–29)
HCT VFR BLD AUTO: 43.2 % (ref 35–47)
HGB BLD-MCNC: 14.1 G/DL (ref 11.7–15.7)
INTERPRETATION ECG - MUSE: NORMAL
INTERPRETATION ECG - MUSE: NORMAL
MCH RBC QN AUTO: 29.4 PG (ref 26.5–33)
MCHC RBC AUTO-ENTMCNC: 32.6 G/DL (ref 31.5–36.5)
MCV RBC AUTO: 90 FL (ref 78–100)
P AXIS - MUSE: 89 DEGREES
P AXIS - MUSE: NORMAL DEGREES
PLATELET # BLD AUTO: 187 10E3/UL (ref 150–450)
POTASSIUM SERPL-SCNC: 4.1 MMOL/L (ref 3.4–5.3)
PR INTERVAL - MUSE: 230 MS
PR INTERVAL - MUSE: NORMAL MS
QRS DURATION - MUSE: 70 MS
QRS DURATION - MUSE: 78 MS
QT - MUSE: 392 MS
QT - MUSE: 458 MS
QTC - MUSE: 435 MS
QTC - MUSE: 464 MS
R AXIS - MUSE: -1 DEGREES
R AXIS - MUSE: -23 DEGREES
RBC # BLD AUTO: 4.8 10E6/UL (ref 3.8–5.2)
SODIUM SERPL-SCNC: 138 MMOL/L (ref 135–145)
SYSTOLIC BLOOD PRESSURE - MUSE: NORMAL MMHG
SYSTOLIC BLOOD PRESSURE - MUSE: NORMAL MMHG
T AXIS - MUSE: 69 DEGREES
T AXIS - MUSE: 8 DEGREES
VENTRICULAR RATE- MUSE: 62 BPM
VENTRICULAR RATE- MUSE: 74 BPM
WBC # BLD AUTO: 7.4 10E3/UL (ref 4–11)

## 2025-08-06 PROCEDURE — 272N000001 HC OR GENERAL SUPPLY STERILE: Performed by: INTERNAL MEDICINE

## 2025-08-06 PROCEDURE — 93657 TX L/R ATRIAL FIB ADDL: CPT | Performed by: INTERNAL MEDICINE

## 2025-08-06 PROCEDURE — 36415 COLL VENOUS BLD VENIPUNCTURE: CPT | Performed by: INTERNAL MEDICINE

## 2025-08-06 PROCEDURE — 258N000003 HC RX IP 258 OP 636: Performed by: NURSE ANESTHETIST, CERTIFIED REGISTERED

## 2025-08-06 PROCEDURE — 999N000054 HC STATISTIC EKG NON-CHARGEABLE

## 2025-08-06 PROCEDURE — C1733 CATH, EP, OTHR THAN COOL-TIP: HCPCS | Performed by: INTERNAL MEDICINE

## 2025-08-06 PROCEDURE — 85347 COAGULATION TIME ACTIVATED: CPT

## 2025-08-06 PROCEDURE — 93623 PRGRMD STIMJ&PACG IV RX NFS: CPT | Mod: 26 | Performed by: INTERNAL MEDICINE

## 2025-08-06 PROCEDURE — 250N000011 HC RX IP 250 OP 636: Performed by: INTERNAL MEDICINE

## 2025-08-06 PROCEDURE — 250N000011 HC RX IP 250 OP 636: Performed by: NURSE ANESTHETIST, CERTIFIED REGISTERED

## 2025-08-06 PROCEDURE — C1894 INTRO/SHEATH, NON-LASER: HCPCS | Performed by: INTERNAL MEDICINE

## 2025-08-06 PROCEDURE — 250N000009 HC RX 250: Performed by: INTERNAL MEDICINE

## 2025-08-06 PROCEDURE — 93656 COMPRE EP EVAL ABLTJ ATR FIB: CPT | Performed by: INTERNAL MEDICINE

## 2025-08-06 PROCEDURE — 93655 ICAR CATH ABLTJ DSCRT ARRHYT: CPT | Performed by: INTERNAL MEDICINE

## 2025-08-06 PROCEDURE — 93005 ELECTROCARDIOGRAM TRACING: CPT

## 2025-08-06 PROCEDURE — C1760 CLOSURE DEV, VASC: HCPCS | Performed by: INTERNAL MEDICINE

## 2025-08-06 PROCEDURE — C1766 INTRO/SHEATH,STRBLE,NON-PEEL: HCPCS | Performed by: INTERNAL MEDICINE

## 2025-08-06 PROCEDURE — 370N000017 HC ANESTHESIA TECHNICAL FEE, PER MIN: Performed by: INTERNAL MEDICINE

## 2025-08-06 PROCEDURE — 80048 BASIC METABOLIC PNL TOTAL CA: CPT | Performed by: INTERNAL MEDICINE

## 2025-08-06 PROCEDURE — C1769 GUIDE WIRE: HCPCS | Performed by: INTERNAL MEDICINE

## 2025-08-06 PROCEDURE — C1759 CATH, INTRA ECHOCARDIOGRAPHY: HCPCS | Performed by: INTERNAL MEDICINE

## 2025-08-06 PROCEDURE — 258N000003 HC RX IP 258 OP 636: Performed by: INTERNAL MEDICINE

## 2025-08-06 PROCEDURE — 710N000010 HC RECOVERY PHASE 1, LEVEL 2, PER MIN

## 2025-08-06 PROCEDURE — 250N000009 HC RX 250: Performed by: NURSE ANESTHETIST, CERTIFIED REGISTERED

## 2025-08-06 PROCEDURE — 85027 COMPLETE CBC AUTOMATED: CPT | Performed by: INTERNAL MEDICINE

## 2025-08-06 PROCEDURE — 93623 PRGRMD STIMJ&PACG IV RX NFS: CPT | Performed by: INTERNAL MEDICINE

## 2025-08-06 PROCEDURE — 93010 ELECTROCARDIOGRAM REPORT: CPT | Performed by: INTERNAL MEDICINE

## 2025-08-06 RX ORDER — PROPOFOL 10 MG/ML
INJECTION, EMULSION INTRAVENOUS PRN
Status: DISCONTINUED | OUTPATIENT
Start: 2025-08-06 | End: 2025-08-06

## 2025-08-06 RX ORDER — LIDOCAINE HYDROCHLORIDE AND EPINEPHRINE 10; 10 MG/ML; UG/ML
INJECTION, SOLUTION INFILTRATION; PERINEURAL
Status: DISCONTINUED | OUTPATIENT
Start: 2025-08-06 | End: 2025-08-06 | Stop reason: HOSPADM

## 2025-08-06 RX ORDER — HEPARIN SODIUM 1000 [USP'U]/ML
INJECTION, SOLUTION INTRAVENOUS; SUBCUTANEOUS
Status: DISCONTINUED | OUTPATIENT
Start: 2025-08-06 | End: 2025-08-06 | Stop reason: HOSPADM

## 2025-08-06 RX ORDER — DEXAMETHASONE SODIUM PHOSPHATE 10 MG/ML
INJECTION, SOLUTION INTRAMUSCULAR; INTRAVENOUS PRN
Status: DISCONTINUED | OUTPATIENT
Start: 2025-08-06 | End: 2025-08-06

## 2025-08-06 RX ORDER — SODIUM CHLORIDE 9 MG/ML
100 INJECTION, SOLUTION INTRAVENOUS CONTINUOUS
Status: DISCONTINUED | OUTPATIENT
Start: 2025-08-06 | End: 2025-08-06 | Stop reason: HOSPADM

## 2025-08-06 RX ORDER — BUPIVACAINE HYDROCHLORIDE 2.5 MG/ML
INJECTION, SOLUTION EPIDURAL; INFILTRATION; INTRACAUDAL; PERINEURAL
Status: DISCONTINUED | OUTPATIENT
Start: 2025-08-06 | End: 2025-08-06 | Stop reason: HOSPADM

## 2025-08-06 RX ORDER — ONDANSETRON 4 MG/1
4 TABLET, ORALLY DISINTEGRATING ORAL EVERY 6 HOURS PRN
Status: DISCONTINUED | OUTPATIENT
Start: 2025-08-06 | End: 2025-08-06 | Stop reason: HOSPADM

## 2025-08-06 RX ORDER — ADENOSINE 3 MG/ML
INJECTION, SOLUTION INTRAVENOUS
Status: DISCONTINUED | OUTPATIENT
Start: 2025-08-06 | End: 2025-08-06 | Stop reason: HOSPADM

## 2025-08-06 RX ORDER — LIDOCAINE 40 MG/G
CREAM TOPICAL
Status: DISCONTINUED | OUTPATIENT
Start: 2025-08-06 | End: 2025-08-06 | Stop reason: HOSPADM

## 2025-08-06 RX ORDER — HEPARIN SODIUM 10000 [USP'U]/100ML
INJECTION, SOLUTION INTRAVENOUS CONTINUOUS PRN
Status: DISCONTINUED | OUTPATIENT
Start: 2025-08-06 | End: 2025-08-06 | Stop reason: HOSPADM

## 2025-08-06 RX ORDER — SODIUM CHLORIDE 9 MG/ML
INJECTION, SOLUTION INTRAVENOUS CONTINUOUS PRN
Status: DISCONTINUED | OUTPATIENT
Start: 2025-08-06 | End: 2025-08-06

## 2025-08-06 RX ORDER — ONDANSETRON 2 MG/ML
INJECTION INTRAMUSCULAR; INTRAVENOUS PRN
Status: DISCONTINUED | OUTPATIENT
Start: 2025-08-06 | End: 2025-08-06

## 2025-08-06 RX ORDER — ONDANSETRON 2 MG/ML
4 INJECTION INTRAMUSCULAR; INTRAVENOUS EVERY 6 HOURS PRN
Status: DISCONTINUED | OUTPATIENT
Start: 2025-08-06 | End: 2025-08-06 | Stop reason: HOSPADM

## 2025-08-06 RX ORDER — FENTANYL CITRATE 50 UG/ML
INJECTION, SOLUTION INTRAMUSCULAR; INTRAVENOUS PRN
Status: DISCONTINUED | OUTPATIENT
Start: 2025-08-06 | End: 2025-08-06

## 2025-08-06 RX ORDER — IBUPROFEN 600 MG/1
600 TABLET, FILM COATED ORAL EVERY 6 HOURS PRN
Status: DISCONTINUED | OUTPATIENT
Start: 2025-08-06 | End: 2025-08-06 | Stop reason: HOSPADM

## 2025-08-06 RX ORDER — EPHEDRINE SULFATE 50 MG/ML
INJECTION, SOLUTION INTRAMUSCULAR; INTRAVENOUS; SUBCUTANEOUS PRN
Status: DISCONTINUED | OUTPATIENT
Start: 2025-08-06 | End: 2025-08-06

## 2025-08-06 RX ORDER — FENTANYL CITRATE 50 UG/ML
25 INJECTION, SOLUTION INTRAMUSCULAR; INTRAVENOUS
Status: DISCONTINUED | OUTPATIENT
Start: 2025-08-06 | End: 2025-08-06 | Stop reason: HOSPADM

## 2025-08-06 RX ORDER — PROTAMINE SULFATE 10 MG/ML
INJECTION, SOLUTION INTRAVENOUS PRN
Status: DISCONTINUED | OUTPATIENT
Start: 2025-08-06 | End: 2025-08-06

## 2025-08-06 RX ORDER — ACETAMINOPHEN 325 MG/1
650 TABLET ORAL EVERY 4 HOURS PRN
Status: DISCONTINUED | OUTPATIENT
Start: 2025-08-06 | End: 2025-08-06 | Stop reason: HOSPADM

## 2025-08-06 RX ADMIN — ONDANSETRON 4 MG: 2 INJECTION INTRAMUSCULAR; INTRAVENOUS at 09:15

## 2025-08-06 RX ADMIN — SODIUM CHLORIDE: 9 INJECTION, SOLUTION INTRAVENOUS at 07:54

## 2025-08-06 RX ADMIN — DEXAMETHASONE SODIUM PHOSPHATE 5 MG: 10 INJECTION, SOLUTION INTRAMUSCULAR; INTRAVENOUS at 07:33

## 2025-08-06 RX ADMIN — PHENYLEPHRINE HYDROCHLORIDE 0.3 MCG/KG/MIN: 10 INJECTION INTRAVENOUS at 07:24

## 2025-08-06 RX ADMIN — PROTAMINE SULFATE 50 MG: 10 INJECTION, SOLUTION INTRAVENOUS at 09:15

## 2025-08-06 RX ADMIN — PHENYLEPHRINE HYDROCHLORIDE 200 MCG: 10 INJECTION INTRAVENOUS at 07:09

## 2025-08-06 RX ADMIN — Medication 5 MG: at 08:50

## 2025-08-06 RX ADMIN — PROPOFOL 130 MG: 10 INJECTION, EMULSION INTRAVENOUS at 07:08

## 2025-08-06 RX ADMIN — SUGAMMADEX 120 MG: 100 INJECTION, SOLUTION INTRAVENOUS at 09:15

## 2025-08-06 RX ADMIN — SODIUM CHLORIDE 100 ML/HR: 0.9 INJECTION, SOLUTION INTRAVENOUS at 06:08

## 2025-08-06 RX ADMIN — SODIUM CHLORIDE: 9 INJECTION, SOLUTION INTRAVENOUS at 07:04

## 2025-08-06 RX ADMIN — ROCURONIUM 50 MG: 50 INJECTION, SOLUTION INTRAVENOUS at 07:09

## 2025-08-06 RX ADMIN — FENTANYL CITRATE 100 MCG: 50 INJECTION, SOLUTION INTRAMUSCULAR; INTRAVENOUS at 07:08

## 2025-08-06 ASSESSMENT — ACTIVITIES OF DAILY LIVING (ADL)
ADLS_ACUITY_SCORE: 54

## 2025-08-06 ASSESSMENT — ENCOUNTER SYMPTOMS: DYSRHYTHMIAS: 1

## 2025-09-02 DIAGNOSIS — I48.0 PAROXYSMAL ATRIAL FIBRILLATION (H): ICD-10-CM

## 2025-09-02 RX ORDER — METOPROLOL SUCCINATE 50 MG/1
50 TABLET, EXTENDED RELEASE ORAL DAILY
Qty: 90 TABLET | Refills: 3 | Status: SHIPPED | OUTPATIENT
Start: 2025-09-02

## (undated) DEVICE — SHEATH AGILIS 8.5FR X 71CM 408310

## (undated) DEVICE — Device

## (undated) DEVICE — ELECTRODE DEFIB CADENCE 22550R

## (undated) DEVICE — CATH ACUNAV 8FRX90CM GE VIVID 10135910

## (undated) DEVICE — SHEATH HEMO 11FR 12CM 406124

## (undated) DEVICE — CATH EP CS BI-DIRECT 115CM 2-8-2 FJ-CURVE BD710FJ282RTS

## (undated) DEVICE — INTRO SHEATH 9FRX10CM PINNACLE RSS902

## (undated) DEVICE — CUSTOM PACK EP

## (undated) DEVICE — CATH TRANSSEPTAL VERSACROSS 45D 63X230CM VXSK0132

## (undated) DEVICE — TRANSDUCER TRAY ARTERIAL 42646-06

## (undated) DEVICE — INTRO SHEATH 8FRX10CM PINNACLE RSS802

## (undated) DEVICE — CLOSURE DEVICE VASCADE VENOUS XL ID10-12FR  800-1012XL-10U

## (undated) RX ORDER — DEXAMETHASONE SODIUM PHOSPHATE 10 MG/ML
INJECTION, SOLUTION INTRAMUSCULAR; INTRAVENOUS
Status: DISPENSED
Start: 2025-08-06

## (undated) RX ORDER — PROTAMINE SULFATE 10 MG/ML
INJECTION, SOLUTION INTRAVENOUS
Status: DISPENSED
Start: 2025-08-06

## (undated) RX ORDER — PROPOFOL 10 MG/ML
INJECTION, EMULSION INTRAVENOUS
Status: DISPENSED
Start: 2025-08-06

## (undated) RX ORDER — BUPIVACAINE HYDROCHLORIDE 5 MG/ML
INJECTION, SOLUTION EPIDURAL; INTRACAUDAL; PERINEURAL
Status: DISPENSED
Start: 2025-08-06

## (undated) RX ORDER — METHOHEXITAL IN WATER/PF 100MG/10ML
SYRINGE (ML) INTRAVENOUS
Status: DISPENSED
Start: 2024-10-31

## (undated) RX ORDER — PHENYLEPHRINE HYDROCHLORIDE 10 MG/ML
INJECTION INTRAVENOUS
Status: DISPENSED
Start: 2025-08-06

## (undated) RX ORDER — HEPARIN SODIUM 1000 [USP'U]/ML
INJECTION, SOLUTION INTRAVENOUS; SUBCUTANEOUS
Status: DISPENSED
Start: 2025-08-06

## (undated) RX ORDER — EPHEDRINE SULFATE 50 MG/ML
INJECTION, SOLUTION INTRAMUSCULAR; INTRAVENOUS; SUBCUTANEOUS
Status: DISPENSED
Start: 2025-08-06

## (undated) RX ORDER — HEPARIN SODIUM 10000 [USP'U]/100ML
INJECTION, SOLUTION INTRAVENOUS
Status: DISPENSED
Start: 2025-08-06

## (undated) RX ORDER — GLYCOPYRROLATE 0.2 MG/ML
INJECTION, SOLUTION INTRAMUSCULAR; INTRAVENOUS
Status: DISPENSED
Start: 2025-08-06

## (undated) RX ORDER — ONDANSETRON 2 MG/ML
INJECTION INTRAMUSCULAR; INTRAVENOUS
Status: DISPENSED
Start: 2025-08-06

## (undated) RX ORDER — FENTANYL CITRATE 50 UG/ML
INJECTION, SOLUTION INTRAMUSCULAR; INTRAVENOUS
Status: DISPENSED
Start: 2025-08-06

## (undated) RX ORDER — LIDOCAINE HYDROCHLORIDE 10 MG/ML
INJECTION, SOLUTION EPIDURAL; INFILTRATION; INTRACAUDAL; PERINEURAL
Status: DISPENSED
Start: 2025-08-06

## (undated) RX ORDER — ISOPROTERENOL HYDROCHLORIDE 0.2 MG/ML
INJECTION, SOLUTION INTRAVENOUS
Status: DISPENSED
Start: 2025-08-06

## (undated) RX ORDER — ADENOSINE 3 MG/ML
INJECTION, SOLUTION INTRAVENOUS
Status: DISPENSED
Start: 2025-08-06